# Patient Record
Sex: FEMALE | Race: WHITE | Employment: OTHER | URBAN - METROPOLITAN AREA
[De-identification: names, ages, dates, MRNs, and addresses within clinical notes are randomized per-mention and may not be internally consistent; named-entity substitution may affect disease eponyms.]

---

## 2019-04-29 ENCOUNTER — INITIAL CONSULT (OUTPATIENT)
Dept: NEUROLOGY | Age: 74
End: 2019-04-29
Payer: MEDICARE

## 2019-04-29 VITALS
DIASTOLIC BLOOD PRESSURE: 57 MMHG | BODY MASS INDEX: 19.16 KG/M2 | HEIGHT: 65 IN | HEART RATE: 86 BPM | SYSTOLIC BLOOD PRESSURE: 99 MMHG | WEIGHT: 115 LBS | OXYGEN SATURATION: 95 %

## 2019-04-29 DIAGNOSIS — F02.80 DEMENTIA DUE TO PARKINSON'S DISEASE WITHOUT BEHAVIORAL DISTURBANCE (HCC): ICD-10-CM

## 2019-04-29 DIAGNOSIS — F34.1 DYSTHYMIA: ICD-10-CM

## 2019-04-29 DIAGNOSIS — G20 DEMENTIA DUE TO PARKINSON'S DISEASE WITHOUT BEHAVIORAL DISTURBANCE (HCC): ICD-10-CM

## 2019-04-29 DIAGNOSIS — G25.81 RESTLESS LEGS SYNDROME (RLS): ICD-10-CM

## 2019-04-29 DIAGNOSIS — G20 PD (PARKINSON'S DISEASE) (HCC): Primary | ICD-10-CM

## 2019-04-29 DIAGNOSIS — I10 HTN (HYPERTENSION), BENIGN: ICD-10-CM

## 2019-04-29 DIAGNOSIS — F51.01 PRIMARY INSOMNIA: ICD-10-CM

## 2019-04-29 PROBLEM — G20.A1 PD (PARKINSON'S DISEASE): Status: ACTIVE | Noted: 2019-04-29

## 2019-04-29 PROBLEM — G20.A1 DEMENTIA DUE TO PARKINSON'S DISEASE WITHOUT BEHAVIORAL DISTURBANCE (HCC): Status: ACTIVE | Noted: 2019-04-29

## 2019-04-29 PROCEDURE — 1036F TOBACCO NON-USER: CPT | Performed by: PSYCHIATRY & NEUROLOGY

## 2019-04-29 PROCEDURE — G8420 CALC BMI NORM PARAMETERS: HCPCS | Performed by: PSYCHIATRY & NEUROLOGY

## 2019-04-29 PROCEDURE — 1090F PRES/ABSN URINE INCON ASSESS: CPT | Performed by: PSYCHIATRY & NEUROLOGY

## 2019-04-29 PROCEDURE — 99204 OFFICE O/P NEW MOD 45 MIN: CPT | Performed by: PSYCHIATRY & NEUROLOGY

## 2019-04-29 PROCEDURE — 4040F PNEUMOC VAC/ADMIN/RCVD: CPT | Performed by: PSYCHIATRY & NEUROLOGY

## 2019-04-29 PROCEDURE — G8400 PT W/DXA NO RESULTS DOC: HCPCS | Performed by: PSYCHIATRY & NEUROLOGY

## 2019-04-29 PROCEDURE — G8427 DOCREV CUR MEDS BY ELIG CLIN: HCPCS | Performed by: PSYCHIATRY & NEUROLOGY

## 2019-04-29 PROCEDURE — 3017F COLORECTAL CA SCREEN DOC REV: CPT | Performed by: PSYCHIATRY & NEUROLOGY

## 2019-04-29 PROCEDURE — 1123F ACP DISCUSS/DSCN MKR DOCD: CPT | Performed by: PSYCHIATRY & NEUROLOGY

## 2019-04-29 RX ORDER — CELECOXIB 200 MG/1
200 CAPSULE ORAL 2 TIMES DAILY
Refills: 0 | COMMUNITY
Start: 2019-03-12 | End: 2020-10-19 | Stop reason: SDUPTHER

## 2019-04-29 RX ORDER — HYDROCODONE BITARTRATE AND ACETAMINOPHEN 5; 325 MG/1; MG/1
2 TABLET ORAL DAILY
COMMUNITY
Start: 2019-04-16 | End: 2020-06-03 | Stop reason: SDUPTHER

## 2019-04-29 RX ORDER — MIRABEGRON 25 MG/1
TABLET, FILM COATED, EXTENDED RELEASE ORAL
Refills: 1 | COMMUNITY
Start: 2019-04-09 | End: 2020-04-28

## 2019-04-29 RX ORDER — SIMVASTATIN 40 MG
40 TABLET ORAL NIGHTLY
COMMUNITY
Start: 2019-04-16 | End: 2020-08-31 | Stop reason: SDUPTHER

## 2019-04-29 RX ORDER — ETHAMBUTOL HYDROCHLORIDE 400 MG/1
TABLET, FILM COATED ORAL
Refills: 6 | COMMUNITY
Start: 2019-03-26 | End: 2020-04-28

## 2019-04-29 RX ORDER — TRAZODONE HYDROCHLORIDE 50 MG/1
100 TABLET ORAL NIGHTLY
Refills: 5 | COMMUNITY
Start: 2019-04-09 | End: 2020-11-02 | Stop reason: SDUPTHER

## 2019-04-29 RX ORDER — AZITHROMYCIN 250 MG/1
TABLET, FILM COATED ORAL
Refills: 6 | COMMUNITY
Start: 2019-03-26 | End: 2019-10-14 | Stop reason: ALTCHOICE

## 2019-04-29 NOTE — PROGRESS NOTES
The patient is a 74y.o. years old female who  was referred by Lyn Joseph MD for consultation regarding possible PD    HPI:  The patient was diagnosed with possible Parkinson disease a year ago. She describes gradual onset of hand tremors more left than right, more weakness, difficulty with voice, generalized debilitation and recurrent falling. Symptoms started gradually and progressed to the point now she can not walk or stand by herself. She has been using her walker or wheelchair for the last 6-9 month. She stays most of the time at home. Symptoms are severe and persistent. No triggers or other associated symptoms. No relieving or aggravating factors. The patient is currently on Sinemet  mg three times daily. She feels improvement on such medication but not for long period of time. She is also on Requip 0.5 mg at night for symptoms of RLS. She has daily spells of  leg discomfort at night before she goes to sleep with the urge  to move her legs. Degree can be severe for few minutes. He denies any other sleep disorder. She denies any insomnia, parasomnia or symptoms of sleep apnea. Patient and her  are concerned about her memory. She has been more forgetful over the last 6 months. She has questions repeatedly. She lost interest in socialization and she stays most of the time at home. She describes soft low voice but no dysphagia. No history of psychosis or hallucination. History of depression on trazodone which can help her sleep. No suicidal ideation or thoughts. No recent neuroimaging. No recent head trauma or passing out. She denies any family history of similar disease. No paresthesia. Other review of system was unremarkable.     Past Medical History:   Diagnosis Date    Blood transfusion reaction 2003    Cancer [facility])     breast cancer    History of blood transfusion     Hypertension     Thyroid disease     Unspecified cerebral artery occlusion with 10-12 system review of constitutional, cardiovascular, respiratory, musculoskeletal, endocrine, skin, SHEENT, genitourinary, psychiatric and neurologic systems was obtained and updated today and is unremarkable except as mentioned in my HPI        Exam:   Constitutional:   Vitals:    04/29/19 1245   BP: (!) 99/57   Pulse: 86   SpO2: 95%   Weight: 115 lb (52.2 kg)   Height: 5' 5\" (1.651 m)       General appearance:  Normal development and appear in no acute distress. Eye: No icterus. Fundus: No blurring of optic disc. Neck: supple  Cardiovascular:          No lower leg edema with good pulsation. Mental Status:   Oriented to person, place, problem, and time. Memory: Poor immediate recall. Intact remote memory  Normal attention span and concentration. Language: intact naming, repeating and fluency. Soft speech  Good fund of Knowledge. Cranial Nerves:   II: Visual fields: Full to confrontation and nl [facility]. Pupils: equal, round, reactive to light  III,IV,VI: Extra Ocular Movements are intact. No nystagmus  V: Facial sensation is intact to pin prick and light touch  VII: Facial strength and movements: intact and symmetric  VIII: Hearing: Intact to finger rub bilaterally  IX: Palate elevation is symmetric  XI: Shoulder shrug is intact  XII: Tongue movements are normal  Musculoskeletal: 4/5 in all 4 extremities. Poor effort  Tone: Normal tone. Reflexes: Bilateral biceps 2/4, triceps 2/4, brachial radialis 2/4, knee 2/4 and ankle 2/4. Planters: flexor bilaterally. Coordination: no pronator drift, no dysmetria with FNF in upper extremities. Poor  REM. Postural and resting left hand tremors with mild cog wheeling in her UE  Sensation: normal to all modalities in both arms and legs. Gait/Posture: NT due to weakness.        Medical decision making:  I personally reviewed and updated social history, past medical history, medications, allergy, surgical history, and family history as documented in the patient's electronic health records. Labs and/or neuroimaging and other test results reviewed and discussed with the patient. Reviewed notes from other physicians. Provided patient education regarding risk, benefits and treatment options as well as adherence to medication regimen and side effect from these medications. Assessment:   Diagnosis Orders   1. PD (Parkinson's disease) ([facility] [address]  carbidopa-levodopa (SINEMET)  MG per tablet    Ambulatory referral to Physical Therapy    [facility]    MRI Brain WO Contrast    Vitamin B12 & Folate   2. Dementia due to Parkinson's disease without behavioral disturbance ([facility] [address]  MRI Brain WO Contrast    Vitamin B12 & Folate   3. HTN (hypertension), benign     4. Dysthymia     5. Primary insomnia     6. Restless legs syndrome (RLS)       Possible Parkinson disease with underlying cognitive impairment. Likely worsen due to significant generalized debilitation from lack of activity. Hypertension  Depression  Insomnia  RLS      Plan:  Check B12 and folate  Request recent blood testing  Change Sinemet to  tablet four times daily  Refill for medication  Continue Requip the same dose for now  Long discussion with the patient regarding side effects of these medications. Discussed risk of falling and injury at home  Discussed possibility of generalized debilitation and the need to increase activity at home gradually. We also discussed risk of falling. Will consider Aricept or Exelon during her next visit to help with her memory.    PT, OT and speech evaluation  MRI of the brain to rule out central cause for her generalized weakness  Continue trazodone for depression insomnia  Continue Requip for her RLS  Follow-up in 2 month

## 2019-04-29 NOTE — LETTER
Kimberly Feldman MD    [facility]  [address] [facility], [address] [place] [address]. [address] [place] [address]    [phone] (Phone)  [phone] (Fax)    Dear Dr Irina Rome MD    I had the pleasure of seeing your patient Jewel GUTHRIE.O.B. 1945 today. I have attached a detailed summary below:    The patient is a 74y.o. years old female who  was referred by Perfecto Joseph MD for consultation regarding possible PD    HPI:  The patient was diagnosed with possible Parkinson disease a year ago. She describes gradual onset of hand tremors more left than right, more weakness, difficulty with voice, generalized debilitation and recurrent falling. Symptoms started gradually and progressed to the point now she can not walk or stand by herself. She has been using her walker or wheelchair for the last 6-9 month. She stays most of the time at home. Symptoms are severe and persistent. No triggers or other associated symptoms. No relieving or aggravating factors. The patient is currently on Sinemet  mg three times daily. She feels improvement on such medication but not for long period of time. She is also on Requip 0.5 mg at night for symptoms of RLS. She has daily spells of  leg discomfort at night before she goes to sleep with the urge  to move her legs. Degree can be severe for few minutes. He denies any other sleep disorder. She denies any insomnia, parasomnia or symptoms of sleep apnea. Patient and her  are concerned about her memory. She has been more forgetful over the last 6 months. She has questions repeatedly. She lost interest in socialization and she stays most of the time at home. She describes soft low voice but no dysphagia. No history of psychosis or hallucination. History of depression on trazodone which can help her sleep. No suicidal ideation or thoughts. No recent neuroimaging.   No recent head trauma or passing out. She denies any family history of similar disease. No paresthesia. Other review of system was unremarkable. Past Medical History:   Diagnosis Date    Blood transfusion reaction 2003    Cancer [facility])     breast cancer    History of blood transfusion     Hypertension     Thyroid disease     Unspecified cerebral artery occlusion with cerebral infarction      Prior to Visit Medications    Medication Sig Taking? Authorizing Provider   simvastatin (ZOCOR) 40 MG tablet  Yes Historical Provider, MD   MYRBETRIQ 25 MG TB24  Yes Historical Provider, MD   nefazodone (SERZONE) 150 MG tablet  Yes Historical Provider, MD   celecoxib (CELEBREX) 200 MG capsule  Yes Historical Provider, MD   traZODone (DESYREL) 50 MG tablet  Yes Historical Provider, MD   HYDROcodone-acetaminophen ([facility]) 5-325 MG per tablet  Yes Historical Provider, MD   azithromycin (ZITHROMAX) 250 MG tablet  Yes Historical Provider, MD   ethambutol (MYAMBUTOL) 400 MG tablet  Yes Historical Provider, MD   carbidopa-levodopa (SINEMET)  MG per tablet Take 1 tablet by mouth 4 times daily Yes Reba Robbins MD   levothyroxine (SYNTHROID) 125 MCG tablet Take 125 mcg by mouth Daily. Indications: Take DOS Yes Historical Provider, MD   rOPINIRole (REQUIP) 0.5 MG tablet Take 1 mg by mouth nightly  Yes Historical Provider, MD   amLODIPine (NORVASC) 10 MG tablet Take 10 mg by mouth daily. Indications: take DOS Yes Historical Provider, MD   omeprazole (PRILOSEC) 20 MG capsule Take 20 mg by mouth daily. Indications: take dos Yes Historical Provider, MD     No Known Allergies  Social History     Tobacco Use    Smoking status: Former Smoker     Packs/day: 1.00     Years: 10.00     Pack years: 10.00    Smokeless tobacco: Never Used    Tobacco comment: Quit 40 years ago   Substance Use Topics    Alcohol use:  Yes     Alcohol/week: 0.6 oz     Types: 1 Cans of beer per week     Comment: 1 beer daily     Family History Problem Relation Age of Onset    Cancer Mother         ovarian and colon    Cancer Father         lung    COPD Brother      Past Surgical History:   Procedure Laterality Date    BREAST SURGERY      lumpectomy    COLONOSCOPY  11/5/2015 2003    COLONOSCOPY  11/5/2015    THORACOTOMY  1984    benign tumor removal    UPPER GASTROINTESTINAL ENDOSCOPY  11/5/15       ROS : A 10-12 system review of constitutional, cardiovascular, respiratory, musculoskeletal, endocrine, skin, SHEENT, genitourinary, psychiatric and neurologic systems was obtained and updated today and is unremarkable except as mentioned in my HPI        Exam:   Constitutional:   Vitals:    04/29/19 1245   BP: (!) 99/57   Pulse: 86   SpO2: 95%   Weight: 115 lb (52.2 kg)   Height: 5' 5\" (1.651 m)       General appearance:  Normal development and appear in no acute distress. Eye: No icterus. Fundus: No blurring of optic disc. Neck: supple  Cardiovascular:          No lower leg edema with good pulsation. Mental Status:   Oriented to person, place, problem, and time. Memory: Poor immediate recall. Intact remote memory  Normal attention span and concentration. Language: intact naming, repeating and fluency. Soft speech  Good fund of Knowledge. Cranial Nerves:   II: Visual fields: Full to confrontation and nl [facility]. Pupils: equal, round, reactive to light  III,IV,VI: Extra Ocular Movements are intact. No nystagmus  V: Facial sensation is intact to pin prick and light touch  VII: Facial strength and movements: intact and symmetric  VIII: Hearing: Intact to finger rub bilaterally  IX: Palate elevation is symmetric  XI: Shoulder shrug is intact  XII: Tongue movements are normal  Musculoskeletal: 4/5 in all 4 extremities. Poor effort  Tone: Normal tone. Reflexes: Bilateral biceps 2/4, triceps 2/4, brachial radialis 2/4, knee 2/4 and ankle 2/4. Planters: flexor bilaterally.   Coordination: no pronator drift, no dysmetria with FNF in upper extremities. Poor  REM. Postural and resting left hand tremors with mild cog wheeling in her UE  Sensation: normal to all modalities in both arms and legs. Gait/Posture: NT due to weakness. Medical decision making:  I personally reviewed and updated social history, past medical history, medications, allergy, surgical history, and family history as documented in the patient's electronic health records. Labs and/or neuroimaging and other test results reviewed and discussed with the patient. Reviewed notes from other physicians. Provided patient education regarding risk, benefits and treatment options as well as adherence to medication regimen and side effect from these medications. Assessment:   Diagnosis Orders   1. PD (Parkinson's disease) ([facility] [address]  carbidopa-levodopa (SINEMET)  MG per tablet    Ambulatory referral to Physical Therapy    [facility] Speech [facility]    MRI Brain WO Contrast    Vitamin B12 & Folate   2. Dementia due to Parkinson's disease without behavioral disturbance ([facility] [address]  MRI Brain WO Contrast    Vitamin B12 & Folate   3. HTN (hypertension), benign     4. Dysthymia     5. Primary insomnia     6. Restless legs syndrome (RLS)       Possible Parkinson disease with underlying cognitive impairment. Likely worsen due to significant generalized debilitation from lack of activity. Hypertension  Depression  Insomnia  RLS      Plan:  Check B12 and folate  Request recent blood testing  Change Sinemet to 25100 tablet four times daily  Refill for medication  Continue Requip the same dose for now  Long discussion with the patient regarding side effects of these medications. Discussed risk of falling and injury at home  Discussed possibility of generalized debilitation and the need to increase activity at home gradually. We also discussed risk of falling. Will consider Aricept or Exelon during her next visit to help with her memory.    PT, OT and speech evaluation

## 2019-05-08 ENCOUNTER — TELEPHONE (OUTPATIENT)
Dept: NEUROLOGY | Age: 74
End: 2019-05-08

## 2019-05-08 ENCOUNTER — HOSPITAL ENCOUNTER (OUTPATIENT)
Dept: MRI IMAGING | Age: 74
Discharge: HOME | End: 2019-05-08
Payer: MEDICARE

## 2019-05-08 DIAGNOSIS — G20 PD (PARKINSON'S DISEASE) (HCC): ICD-10-CM

## 2019-05-08 DIAGNOSIS — G08 CEREBRAL VENOUS THROMBOSIS: Primary | ICD-10-CM

## 2019-05-08 DIAGNOSIS — G20 DEMENTIA DUE TO PARKINSON'S DISEASE WITHOUT BEHAVIORAL DISTURBANCE (HCC): ICD-10-CM

## 2019-05-08 DIAGNOSIS — F02.80 DEMENTIA DUE TO PARKINSON'S DISEASE WITHOUT BEHAVIORAL DISTURBANCE (HCC): ICD-10-CM

## 2019-05-08 PROCEDURE — 70551 MRI BRAIN STEM W/O DYE: CPT

## 2019-05-09 ENCOUNTER — TELEPHONE (OUTPATIENT)
Dept: NEUROLOGY | Age: 74
End: 2019-05-09

## 2019-05-09 NOTE — TELEPHONE ENCOUNTER
Lucy with [facility] called to give verbal results on patient's MRI. I did ask if this was the repeat MRI, and she stated that it was not, that the patient would be contacted when the MRI machine is repaired to return for the additional images. The radiologist ask that she call the office with the verbal results of the images that he was able to read. 3. Abnormal signal in the right sigmoid and transverse sinus, concerning for   dural venous sinus thrombosis.  A CT venogram may be of benefit for further   evaluation. Do you want to proceed with ordering a CT VENOGRAM for patient in regards to the DVST? Please advise.

## 2019-05-09 NOTE — TELEPHONE ENCOUNTER
Patient notified. Patient verbalized understanding. I gave her [facility] Scheduling phone number and advised her to have her  call me with any questions.

## 2019-05-09 NOTE — TELEPHONE ENCOUNTER
Advised patient about her quant results.  Lab was ordered and appointment was scheduled.    Will need MRV brain

## 2019-05-10 ENCOUNTER — TELEPHONE (OUTPATIENT)
Dept: NEUROLOGY | Age: 74
End: 2019-05-10

## 2019-05-10 DIAGNOSIS — G08 CEREBRAL VENOUS THROMBOSIS: Primary | ICD-10-CM

## 2019-05-10 NOTE — TELEPHONE ENCOUNTER
Jerod Gutierres from [employer] called [phone] requesting existing MRV order be changed to with & without contrast per Radiologist. Janis Butterfield order is just for MRV with contrast.     Please advise. Thank you.

## 2019-05-13 ENCOUNTER — HOSPITAL ENCOUNTER (OUTPATIENT)
Dept: PHYSICAL THERAPY | Age: 74
Setting detail: THERAPIES SERIES
Discharge: HOME | End: 2019-05-13
Payer: MEDICARE

## 2019-05-13 ENCOUNTER — HOSPITAL ENCOUNTER (OUTPATIENT)
Dept: SPEECH THERAPY | Age: 74
Setting detail: THERAPIES SERIES
Discharge: HOME | End: 2019-05-13
Payer: MEDICARE

## 2019-05-13 ENCOUNTER — HOSPITAL ENCOUNTER (OUTPATIENT)
Age: 74
Discharge: HOME | End: 2019-05-13
Payer: MEDICARE

## 2019-05-13 DIAGNOSIS — G20 PD (PARKINSON'S DISEASE) (HCC): ICD-10-CM

## 2019-05-13 DIAGNOSIS — G20 DEMENTIA DUE TO PARKINSON'S DISEASE WITHOUT BEHAVIORAL DISTURBANCE (HCC): ICD-10-CM

## 2019-05-13 DIAGNOSIS — F02.80 DEMENTIA DUE TO PARKINSON'S DISEASE WITHOUT BEHAVIORAL DISTURBANCE (HCC): ICD-10-CM

## 2019-05-13 DIAGNOSIS — R48.8 OTHER SYMBOLIC DYSFUNCTIONS: Primary | ICD-10-CM

## 2019-05-13 LAB
FOLATE: >20 NG/ML (ref 4.78–24.2)
VITAMIN B-12: 447 PG/ML (ref 211–911)

## 2019-05-13 PROCEDURE — 92523 SPEECH SOUND LANG COMPREHEN: CPT

## 2019-05-13 PROCEDURE — 36415 COLL VENOUS BLD VENIPUNCTURE: CPT

## 2019-05-13 PROCEDURE — 82746 ASSAY OF FOLIC ACID SERUM: CPT

## 2019-05-13 PROCEDURE — 97162 PT EVAL MOD COMPLEX 30 MIN: CPT

## 2019-05-13 PROCEDURE — 97116 GAIT TRAINING THERAPY: CPT

## 2019-05-13 PROCEDURE — 97110 THERAPEUTIC EXERCISES: CPT

## 2019-05-13 PROCEDURE — 82607 VITAMIN B-12: CPT

## 2019-05-13 NOTE — PROGRESS NOTES
The following patient has been evaluated for physical therapy services. In order for therapy to continue, Medicare requires physician review of the treatment plan. Please review the attached evaluation and/or summary of the patient's plan of care, and verify that you agree by signing below and sending it back to our office. PT also recommends referral for OT evaluation and treatment. Signing below will verify that you agree. Thank you for this referral.    Physician signature_______________________ Date________________    Fax to:   [facility] [phone]             NEUROLOGICAL / GENERAL MOBILITY PHYSICAL THERAPY EVALUATION      Evaluation Date: 5/13/2019        Patient Name:  Jada Durant       YOB: 1945        Medical Diagnosis: Parkinson's Disease        ICD 10:  G20    Treatment Diagnosis:  Abnormality of gait, weakness, decreased balance    Onset Date:   3 years ago     Referral Date:  4/29/19    Referring Physician:  Dr. Garima Nichols         Visits Allowed/Insurance/Certification Information:  Medicare / [facility]     Restrictions/Precautions:  Hx of breast CA, HTN (pt stopped taking her BP meds and hasn't told MD), hx of CVA    Pt's Occupation/Job Duties:  Pt is retired     Health History reviewed with pt:   [x]   Yes   []   No          SUBJECTIVE FINDINGS      History of Present Illness:  Pt states she began having symptoms about 3 years ago, with hand tremors. Symptoms have progressed and PCP suspected Parkinsons. Pt referred to neuro, pt had MRI and now scheduled for MRV at [facility]. Pt referred to outpt therapies. Pt was having OT and PT at home. Pt states she stopped taking her BP med about 7-10 ago, felt tired and thought BP was low even though she doesn't really check it at home. Pt does have BP monitor at home. Pain     [x]Yes   []No   Patient describes pain to be thoracic area, 3-4 years duration.   Also has pain in B LEs  Patient reports pain is  /10 pain at present and  6/10 pain at its worst.  Worsened by not sure (thoracic). Legs worsen with prolonged sitting   Improved by heating pad, pain meds (thoracic)  Legs improve with walking with walker. Current Functional Limitations:   [x]Yes   []No   Functional Complaints:   Walking community distances (pt uses rollator in home), steps, doesn't go to [employer] anymore, doesn't go to store, pt gets help from [address] [place] [address] for showering, gets help with dressing from , pt able to toilet herself. Pt also complains of arm weakness. PLOF:   [x]No functional limitations prior to 3 years ago   []Pt with previous functional limitations   Pt's sleep is affected?    [x]Yes   []No  Sometimes wakes up with tremors L hand, leg sometimes    Social support/Environment:    Family/caregiver support:   [x]Yes  (had MI in December but doing well)  []No    Home Environment:   []1 story   [x]>2 story (bed and bath on main floor)   [x]VETO (8)    []No rail   [x]R handrail  Going into home and window ledge on the Left side   []L handrail    Bathroom Environment:   [x]Walk in shower   []Tub   []Tub/shower combo     [x]Grab bars   [x]Shower seat   [x]Modified toilet   [x]Hand held shower head    Equipment:    []Rolling walker   []Standard walker   [x]Rollator   []Merrick-walker  [x]Wheelchair   []Quad cane   []Straight cane  [x]Bedside commode  [x]Hospital bed  Other:      Patient goal for therapy:  \"get up and walk without using rollator, back would quit hurting \"      OBJECTIVE FINDINGS      Cognitive Status    Alert and oriented to:   [x]Person   [x]Place   [x]Time   [x]Situation    Communication   [x]WFL   []Impaired     Comments:     Cardiopulmonary Status   []NT  Blood pressure: 119/76  Upon standing 94/73  + orthostatic hypotension  Heart Rate: 72  SpO2: NT    Tone   [x]WNL   []Hypertonia   []Hypotonia   Location:    []R UE   []L  UE   []R LE   []L  LE    Trunk Control   [x]Good   []Fair []Poor    Vision:   []WNL   [x]Impaired  Comments:  Wears bifocals, also has cataracts B     Hearing:   []WNL   [x]Impaired  Comments: some hearing loss but no hearing aides     Sensation    Light touch:   [x]WNL   []Impaired  Location:    Proprioception:  [x]WNL    []Impaired  Location:    []R UE   []L  UE   []R LE   []L  LE    Strength/ROM    []All ROM WNL except as marked below    []All strength WNL (5/5) except as marked below (measured out of 5)   [x]UE ROM/strength deferred to OT       Movement Left Right Comments Movement Left Right Comments   Shoulder Flexion    Hip Flexion 4 4    Shoulder Extension    Hip Extension NT NT    Shoulder Abduction    Hip Abduction 4- 3-    Shoulder IR    Hip Adduction 2+ 2+    Shoulder ER    Hip Internal Rotation 2+ 2+    Elbow Flexion     Hip External Rotation 2+ 2+    Elbow Extension    Knee Flexion 4 4    Supination    Knee Extension 4 4    Pronation    Ankle Dorsiflexion 3- 4    Wrist Flexion    Ankle Plantarflexion NT NT    Wrist Extension    Ankle Inversion 3- 4    Hand    Ankle Eversion 3- 4        Flexibility     Muscle Findings   Hip flexors/Azar  []WNL   [x]Decreased R   [x]Decreased L   []NT   Hamstrings  Degrees in 90/90 []WNL   [x]Decreased R   [x]Decreased L   []NT  Right:              Left:      Gastrocs []WNL   [x]Decreased R   [x]Decreased L   []NT   Obers/TFL/ITB []WNL   []Decreased R   []Decreased L   [x]NT   Piriformis  []WNL   []Decreased R   []Decreased L   [x]NT   Other:  []WNL   []Decreased R   []Decreased L   []NT     Reflexes   []All reflexes WNL (2+) or negative test except as marked below      Reflex Left Right   Biceps (C5,6)     Brachioradialis (C6)     Triceps (C7)     Quadriceps (L3,4)     Achilles (S1,2)     Ankle clonus     Mel's reflex      Babinski's reflex        Coordination Testing:     [x]NT  Finger to Nose:        []WNL     []Impaired  Alternating pronation/supination:  []WNL     []Impaired  Heel to shin (sitting or supine): []WNL     []Impaired  Toe Tapping:        []WNL     []Impaired    Functional Mobility    Transitional Movement Assistance Level   Rolling to left side [] I   []Mod I   []S   []SBA   []CGA   [x]MIN A   []MOD A   []MAX A   []TOTAL   []NT   Rolling to right side [] I   []Mod I   []S   []SBA   []CGA   [x]MIN A   []MOD A   []MAX A   []TOTAL   []NT   Scooting in bed [x] I   []Mod I   []S   []SBA   []CGA   []MIN A   []MOD A   []MAX A   []TOTAL   []NT   Supine to sit [x] I   []Mod I   []S   []SBA   []CGA   []MIN A   []MOD A   []MAX A   []TOTAL   []NT   Sit to supine [x] I   []Mod I   []S   []SBA   []CGA   []MIN A   []MOD A   []MAX A   []TOTAL   []NT   Sit to stand [] I   []Mod I   []S   [x]SBA   []CGA   []MIN A   []MOD A   []MAX A   []TOTAL   []NT   Stand to sit [] I   []Mod I   []S   [x]SBA   []CGA   []MIN A   []MOD A   []MAX A   []TOTAL   []NT   Bed to chair  [] I   []Mod I   []S   []SBA   [x]CGA   []MIN A   []MOD A    []MAX A   []TOTAL   []NT   Toilet transfer  [] I   []Mod I   []S   []SBA   []CGA   []MIN A   []MOD A    []MAX A   []TOTAL   [x]NT   Tub transfer  [] I   []Mod I   []S   []SBA   []CGA   []MIN A   []MOD A    []MAX A   []TOTAL   [x]NT   Car transfer [] I   []Mod I   []S   []SBA   []CGA   []MIN A   []MOD A    []MAX A   []TOTAL   [x]NT     Balance    Static Sitting:        []Normal   [x]Good   []Fair   []Poor  Dynamic Sitting:   []Normal   [x]Good   []Fair   []Poor  Static Stance:       []Normal   []Good   [x]Fair   []Poor  Dynamic Stance:  []Normal   []Good   []Fair   [x]Poor    Tinetti Total Score:    Balance Score: 6  Gait Score: 6  Tinetti Total Score: 12    Risk of Falls:   []Low (> 24)   []Mod (19-23)   [x]High (< 18)    Gait Testing:     Level of Assistance needed:   []Ind   []Mod I   []SBA/Sup   [x]CGA   []MIN A   []MOD A  []MAX A   []Dependent/Total Assist     Gait Deviations (firm surface/linoleum):     [] None   []Slow rex    []Increased NATALIA   [x]Decreased step length  [x]Decreased step height Therapy  [x]   Neuromuscular Re-Education  [x]   Gait Training   [x]   Therapeutic Activity  []   Other     Thank you for the referral of this patient.       Timed Code Treatment Minutes:   45  minutes     Total Treatment Time:  80  minutes    Lisandro Guillen PT, OMT-C, [id number]

## 2019-05-13 NOTE — PROGRESS NOTES
Speech Language Pathology  [facility] Phone: [phone] Hospital Fax: [phone]    To:       From: Dixie Barry, LUCIEN-SLP     Patient: Porfirio Mojica     : 1945  Medical Diagnosis:    Date: 2019  ICD 10:  Treatment Diagnosis:      Speech Therapy Certification/Re-Certification Form  Dear Dr. Michelle Serrano  The following patient has been evaluated for speech therapy services. In order for therapy to continue, we require physician review of the treatment plan. Please review the attached evaluation and/or summary of the patient's plan of care, and verify that you agree by signing below and sending it back to our office. Thank you for this referral.      Plan of Care/Treatment to date:  X Speech-Language Evaluation/Treatment    ? Dysphagia Evaluation/Treatment        ? Dysphagia Treatment via Neuromuscular Electrical Stimulation (NMES)   ? Modified Barium Swallowing Study   X Cognitive-Linguistic Skills Development  ? Voice evaluation and Treatment      ? Evaluation, modification, and Training of Voice Prosthetic     ? Evaluation for Speech-Generating Augmentative and Alternative Communication Device   ? Therapeutic Services for the use of Speech-Generating Device. ? Other:          Goals:  Short-term Goals  Timeframe for Short-term Goals: 2 weeks ( 2019)  Goal 1: The patient will complete graded short term memory tasks with 90% accuracy given no cues  Goal 2: The patient will complete high-level problem solving tasks with 90% accuracy given no cues  Goal 3: The patient will complete graded attention tasks with 90% accuracy given no cues  Long-term Goals  Timeframe for Long-term Goals: 1 month (2019)  Goal 1: The patient will implement compensatory strategies and recommendation to enhance functional memory skills to [facility]      Frequency/Duration:  # Days per week: ? 1 day # Weeks: ? 1 week ? 5 weeks      X 2 days   ? 2 weeks X 6 weeks     ? 3 days   ?  3 weeks ? 7 weeks     ? 4 days   ? 4 weeks ? 8 weeks    Rehab Potential: ? Excellent X Good ? Fair  ? Poor       Electronically signed by:  Lizz Ballesteros MA,CCC-SLP      If you have any questions or concerns, please don't hesitate to call.   Thank you for your referral.      Physician Signature:________________________________Date:__________________  By signing above, therapists plan is approved by physician

## 2019-05-13 NOTE — FLOWSHEET NOTE
Outpatient Physical Therapy     [x] Daily Treatment Note   [] Progress Note   [] Discharge Note    Date:  5/13/2019    Patient Name:  Jian Lamas         YOB: 1945    Medical Diagnosis:   Parkinson's Disease                                         ICD 10:  G20     Treatment Diagnosis:  Abnormality of gait, weakness, decreased balance     Onset Date:    3 years ago          Referral Date:  4/29/19     Referring Physician:  Dr. Isaiah Jason                                                     Visits Allowed/Insurance/Certification Information:  Medicare / [employer] [facility]      Restrictions/Precautions:  Hx of breast CA, HTN (pt stopped taking her BP meds and hasn't told MD), hx of CVA    Plan of care sent to provider:   []NA   [x]Faxed   [x]Co-signature  (sent for OT request also)     Plan of care signed:    []NA   []Yes   [x]No      Progress Note covers period from (if applicable):    [x]NA    []From          To           Next Progress Note due:  6/3/19     Visit# / total visits:  1/12-16    Plan for Next Session:  Nustep, progress LE flexibility and strength exs as able, bed mobility, transfers, gait, balance and steps    Subjective:  See eval     Pain level:   AT EVAL:   Patient describes pain to be thoracic area, 3-4 years duration. Also has pain in B LEs  Patient reports pain is  /10 pain at present and  6/10 pain at its worst.  Worsened by not sure (thoracic). Legs worsen with prolonged sitting   Improved by heating pad, pain meds (thoracic)  Legs improve with walking with walke    Objective:       Exercises:    Exercises in bold performed in department today. Items not bolded are carried forward from prior visits for continuity of the record.   Exercise/Equipment Resistance/Repetitions Other comments            bridges 1x10 Pt to try to work up to 3x10, 1-2x/day     Hip add mya hook lying or seated Hold 5 sec, 1x10   \"     Supine hip abd B  1x10 B   \"     LAQ 1x10 B   \"     Seated heel/toe raises 1x10   \"                                                                                                   Therapeutic Exercise/Home Exercise Program:   25 minutes  Pt inst in role of PT, prognosis, plan of care, activity modification, and benefits of therapy. HEP established, See above, pt given handout(s)       Group Therapy:    0 minutes    Therapeutic Activity:  5 minutes  Cues for bed mobility and safe transfers     Gait: 15 minutes  Gait in dept with rollator, cues for safe technique, proper walker placement, transfer to chair    Neuromuscular Re-Education:  0 minutes      Canalith Repositioning Procedure:      Manual Therapy:  0 minutes    Modalities: 0 minutes    Functional Outcome Measure:   []NA  Measure Used:   Date Assessed:    Assessment/Treatment/Activity Tolerance:    Patients response to treatment:   [x]Patient tolerated treatment well []Patient limited by fatigue   []Patient limited by pain  []Patient limited by other medical complications   []Other:     Goals:   Progress towards goals:  NA    GOALS    Short Term Goals:   3-4  weeks Long Term Goals:  6-8  weeks   1). Establish HEP 1). Pt independent with HEP   2). All bed mobility and transfers independent 2). Gait with LRAD all distances and  independent   3). Increase score on Tinetti to 18/28 or better  3). Pt able to do flight of steps with reciprocal pattern with 1 rail with SBA    4). Gait with RW /rollator 250' x 1 SBA  4).  Increase Tinetti score to 24/28       Prognosis: [x]Good   []Fair   []Poor    Patient Requires Follow-up:  [x]Yes  []No    Plan: [x]Plan of care initiated     []Continue per plan of care    [] Alter current plan (see comments)    []Hold pending MD visit []Discharge    Timed Code Treatment Minutes:  45    Total Treatment Minutes:  80    Medicare Cap total YTD:  $170    Electronically signed by:  Kodak Angeles, PT, OMT-C, [id number]

## 2019-05-13 NOTE — TELEPHONE ENCOUNTER
[facility] scheduling is asking for new order for MRV to be with and without contrast.    Okay to change order? Please advise.

## 2019-05-13 NOTE — PROGRESS NOTES
Speech Language Pathology  Facility/Department: [facility] SPEECH THERAPY  Initial Assessment    NAME: Zeb Barry  : 1945  MRN: [id number]    Date of Eval: 2019  Evaluating Therapist: Alin Krishnan    Visit Diagnoses       Codes    Other symbolic dysfunctions    -  Primary R48.8            Past Medical History: has a past medical history of Blood transfusion reaction, Cancer ([address] History of blood transfusion, Hypertension, Thyroid disease, and Unspecified cerebral artery occlusion with cerebral infarction. Past Surgical History:  has a past surgical history that includes Breast surgery; thoracotomy (); Colonoscopy (2015); Colonoscopy (2015); and Upper gastrointestinal endoscopy (11/5/15). Primary Complaint: The patient is a 75 y/o female who was diagnosed with parkinson's disease approx 1 yr prior. Following dx, the patient reports noticing problems with short term memory that have been progressing as time goes on. Neurologist recommending cognitive-linguistic evaluation at this time to assess cognitive-linguistic abilities and provide treatment as warranted. Onset Date: 19    Pain:  Pain Assessment  Patient Currently in Pain: No    Assessment:  Cognitive Diagnosis: Mild Cognitice Deficit     The patient presents with mild cognitive deficits at this time. SLP assessing the following areas:   -  Visuospatial/executive function  - Naming  - Attention  - Memory  - Language  - Recall  - Abstraction  - Orientation    SLP administering [facility] where the patient scored 26 out of 30. Notable deficits were in the areas of delayed recall, attention, and memory. While the patient scored WNL for this assessment, the patient reports her memory problems have been worsening at home and that she experiences more memory issues during completion of daily tasks.  For example, the patient states that she frequently forgets why she walked into a Cognition  Orientation  Overall Orientation Status: Within Normal Limits  Attention  Attention: Exceptions to [facility]  Alternating Attention: Moderate  Memory  Memory: Exceptions to [facility]  Daily Routines: Mild  Short-term Memory: Moderate  Problem Solving  Problem Solving: Exceptions to [facility]  Complex Functional Tasks: Mild  Numeric Reasoning  Numeric Reasoning: Within Functional Limits  Abstract Reasoning  Abstract Reasoning: Within Functional Limits  Safety/Judgement  Safety/Judgement: Within Functional Limits      Plan:    Goals:   Short-term Goals  Timeframe for Short-term Goals: 2 weeks ( 5/27/2019)  Goal 1: The patient will complete graded short term memory tasks with 90% accuracy given no cues  Goal 2: The patient will complete high-level problem solving tasks with 90% accuracy given no cues  Goal 3: The patient will complete graded attention tasks with 90% accuracy given no cues  Long-term Goals  Timeframe for Long-term Goals: 1 month (6/13/2019)  Goal 1: The patient will implement compensatory strategies and recommendation to enhance functional memory skills to [facility]      Speech Therapy Prognosis  Prognosis: Good  Prognosis Considerations: Co-Morbidities, Medical Diagnosis, Medical Prognosis  Duration/Frequency of Treatment  Duration/Frequency of Treatment: 1-2x/wk  Recommendations  Requires SLP Intervention: Yes  D/C Recommendations: To be determined  Patient Education: SLP re: Importance and rationale of cognitive evaluation, results of evaluation, recommendations for additional treatent  Patient Education Response: Verbalizes understanding  D/C Recommendations: To be determined  Requires SLP Intervention: Yes  Patient/family involved in developing goals and treatment plan: Yes          Follow Up:   Follow up in: 1 week      MEKA Ellis M.A., [address] [place] [address] [id number]  Speech-Language Pathologist

## 2019-05-14 ENCOUNTER — TELEPHONE (OUTPATIENT)
Dept: NEUROLOGY | Age: 74
End: 2019-05-14

## 2019-05-14 DIAGNOSIS — G08 CEREBRAL VENOUS THROMBOSIS: Primary | ICD-10-CM

## 2019-05-14 NOTE — TELEPHONE ENCOUNTER
Glo Holley from [address] [place] [address] called needing a lab order (Creatinine) placed for pt to come next week please. Last seen 4/29/19  Next appt is 7/22/19    Please advise. Thank you.

## 2019-05-15 ENCOUNTER — HOSPITAL ENCOUNTER (OUTPATIENT)
Dept: SPEECH THERAPY | Age: 74
Setting detail: THERAPIES SERIES
Discharge: HOME | End: 2019-05-15
Payer: MEDICARE

## 2019-05-15 ENCOUNTER — HOSPITAL ENCOUNTER (OUTPATIENT)
Dept: PHYSICAL THERAPY | Age: 74
Setting detail: THERAPIES SERIES
Discharge: HOME | End: 2019-05-15
Payer: MEDICARE

## 2019-05-15 NOTE — PROGRESS NOTES
Physical Therapy Discharge Note      This note covers period from 5/13/19 to 5/15/19  Pt has decided to continue therapy services at [facility].  Pt has only attended 1 visit of PT for evaluation and initiation of treatment. Goals not met. Will discharge pt from PT.      [address] [place] [address], PT, OMT-C, [id number]  5/19/19

## 2019-05-20 ENCOUNTER — APPOINTMENT (OUTPATIENT)
Dept: SPEECH THERAPY | Age: 74
End: 2019-05-20
Payer: MEDICARE

## 2019-05-20 ENCOUNTER — APPOINTMENT (OUTPATIENT)
Dept: PHYSICAL THERAPY | Age: 74
End: 2019-05-20
Payer: MEDICARE

## 2019-05-23 ENCOUNTER — APPOINTMENT (OUTPATIENT)
Dept: PHYSICAL THERAPY | Age: 74
End: 2019-05-23
Payer: MEDICARE

## 2019-05-23 ENCOUNTER — APPOINTMENT (OUTPATIENT)
Dept: SPEECH THERAPY | Age: 74
End: 2019-05-23
Payer: MEDICARE

## 2019-05-30 ENCOUNTER — APPOINTMENT (OUTPATIENT)
Dept: PHYSICAL THERAPY | Age: 74
End: 2019-05-30
Payer: MEDICARE

## 2019-05-30 ENCOUNTER — APPOINTMENT (OUTPATIENT)
Dept: SPEECH THERAPY | Age: 74
End: 2019-05-30
Payer: MEDICARE

## 2019-07-22 ENCOUNTER — OFFICE VISIT (OUTPATIENT)
Dept: NEUROLOGY | Age: 74
End: 2019-07-22
Payer: MEDICARE

## 2019-07-22 VITALS
WEIGHT: 115 LBS | HEART RATE: 86 BPM | BODY MASS INDEX: 19.16 KG/M2 | HEIGHT: 65 IN | SYSTOLIC BLOOD PRESSURE: 116 MMHG | DIASTOLIC BLOOD PRESSURE: 71 MMHG | OXYGEN SATURATION: 97 %

## 2019-07-22 DIAGNOSIS — I10 HTN (HYPERTENSION), BENIGN: ICD-10-CM

## 2019-07-22 DIAGNOSIS — Z86.73 REMOTE HISTORY OF STROKE: ICD-10-CM

## 2019-07-22 DIAGNOSIS — G20 PD (PARKINSON'S DISEASE) (HCC): Primary | ICD-10-CM

## 2019-07-22 DIAGNOSIS — F34.1 DYSTHYMIA: ICD-10-CM

## 2019-07-22 DIAGNOSIS — F02.80 DEMENTIA DUE TO PARKINSON'S DISEASE WITHOUT BEHAVIORAL DISTURBANCE (HCC): ICD-10-CM

## 2019-07-22 DIAGNOSIS — G20 DEMENTIA DUE TO PARKINSON'S DISEASE WITHOUT BEHAVIORAL DISTURBANCE (HCC): ICD-10-CM

## 2019-07-22 DIAGNOSIS — G25.81 RESTLESS LEGS SYNDROME (RLS): ICD-10-CM

## 2019-07-22 PROCEDURE — 1123F ACP DISCUSS/DSCN MKR DOCD: CPT | Performed by: PSYCHIATRY & NEUROLOGY

## 2019-07-22 PROCEDURE — G8400 PT W/DXA NO RESULTS DOC: HCPCS | Performed by: PSYCHIATRY & NEUROLOGY

## 2019-07-22 PROCEDURE — 99214 OFFICE O/P EST MOD 30 MIN: CPT | Performed by: PSYCHIATRY & NEUROLOGY

## 2019-07-22 PROCEDURE — G8427 DOCREV CUR MEDS BY ELIG CLIN: HCPCS | Performed by: PSYCHIATRY & NEUROLOGY

## 2019-07-22 PROCEDURE — G8420 CALC BMI NORM PARAMETERS: HCPCS | Performed by: PSYCHIATRY & NEUROLOGY

## 2019-07-22 PROCEDURE — 1036F TOBACCO NON-USER: CPT | Performed by: PSYCHIATRY & NEUROLOGY

## 2019-07-22 PROCEDURE — 3017F COLORECTAL CA SCREEN DOC REV: CPT | Performed by: PSYCHIATRY & NEUROLOGY

## 2019-07-22 PROCEDURE — 1090F PRES/ABSN URINE INCON ASSESS: CPT | Performed by: PSYCHIATRY & NEUROLOGY

## 2019-07-22 PROCEDURE — 4040F PNEUMOC VAC/ADMIN/RCVD: CPT | Performed by: PSYCHIATRY & NEUROLOGY

## 2019-07-22 RX ORDER — VORTIOXETINE 5 MG/1
TABLET, FILM COATED ORAL
Refills: 3 | COMMUNITY
Start: 2019-07-02 | End: 2020-04-28

## 2019-07-22 NOTE — PROGRESS NOTES
The patient is a 74years old female who came today for follow-up regarding possible Parkinson, ataxia, dementia and recent work up. Since her last visit, she had MRI of the brain which was somewhat limited although showed remote right MCA stroke. Recent blood test showed normal B12. She continues to have daily tremors in her hands more left than right. Degree can be moderate and duration is minutes. Tremors can be worse at rest or with stress. She increase her Sinemet to  tablet 4 times daily. No significant improvement with such a change although she feels Sinemet can help the tremors for at least 15 minutes. She takes Requip 0.5 mg at night for RLS. She continues to walk with her walker. She has been active at home. She had few sessions with PT and OT without improvement. She denies any recent falling or injury or bladder or bowel issues. No other triggers or other associated symptoms. She feels stiff in her legs but not her arms. No severe constipation, dysphagia or dysphonia. She continues have daily episodes of short-term memory loss. Degree is mild to moderate. She needs prompting her . No significant psychosis hallucination. No recurrent syncope or dizziness. Patient denies any new symptoms today. Other review of system was unremarkable. Past Medical History:   Diagnosis Date    Blood transfusion reaction 2003    Cancer [facility])     breast cancer    History of blood transfusion     Hypertension     Thyroid disease     Unspecified cerebral artery occlusion with cerebral infarction      Prior to Visit Medications    Medication Sig Taking?  Authorizing Provider   TRINTELLIX 5 MG tablet  Yes Historical Provider, MD   simvastatin (ZOCOR) 40 MG tablet  Yes Historical Provider, MD   MYRBETRIQ 25 MG TB24  Yes Historical Provider, MD   nefazodone (SERZONE) 150 MG tablet  Yes Historical Provider, MD   celecoxib (CELEBREX) 200 MG capsule  Yes Historical Provider, MD

## 2019-07-24 DIAGNOSIS — G20 PD (PARKINSON'S DISEASE) (HCC): ICD-10-CM

## 2019-08-22 ENCOUNTER — TELEPHONE (OUTPATIENT)
Dept: NEUROLOGY | Age: 74
End: 2019-08-22

## 2019-08-22 RX ORDER — ROPINIROLE 1 MG/1
1 TABLET, FILM COATED ORAL 2 TIMES DAILY
Qty: 60 TABLET | Refills: 2 | Status: SHIPPED | OUTPATIENT
Start: 2019-08-22 | End: 2019-11-19 | Stop reason: SDUPTHER

## 2019-10-14 ENCOUNTER — OFFICE VISIT (OUTPATIENT)
Dept: NEUROLOGY | Age: 74
End: 2019-10-14
Payer: MEDICARE

## 2019-10-14 VITALS
SYSTOLIC BLOOD PRESSURE: 115 MMHG | HEIGHT: 66 IN | HEART RATE: 78 BPM | OXYGEN SATURATION: 97 % | BODY MASS INDEX: 18.48 KG/M2 | WEIGHT: 115 LBS | DIASTOLIC BLOOD PRESSURE: 63 MMHG

## 2019-10-14 DIAGNOSIS — F34.1 DYSTHYMIA: ICD-10-CM

## 2019-10-14 DIAGNOSIS — G20 DEMENTIA DUE TO PARKINSON'S DISEASE WITHOUT BEHAVIORAL DISTURBANCE (HCC): ICD-10-CM

## 2019-10-14 DIAGNOSIS — G20 PD (PARKINSON'S DISEASE) (HCC): Primary | ICD-10-CM

## 2019-10-14 DIAGNOSIS — Z86.73 REMOTE HISTORY OF STROKE: ICD-10-CM

## 2019-10-14 DIAGNOSIS — I10 HTN (HYPERTENSION), BENIGN: ICD-10-CM

## 2019-10-14 DIAGNOSIS — G25.81 RESTLESS LEGS SYNDROME (RLS): ICD-10-CM

## 2019-10-14 DIAGNOSIS — F02.80 DEMENTIA DUE TO PARKINSON'S DISEASE WITHOUT BEHAVIORAL DISTURBANCE (HCC): ICD-10-CM

## 2019-10-14 PROCEDURE — 1123F ACP DISCUSS/DSCN MKR DOCD: CPT | Performed by: PSYCHIATRY & NEUROLOGY

## 2019-10-14 PROCEDURE — 99214 OFFICE O/P EST MOD 30 MIN: CPT | Performed by: PSYCHIATRY & NEUROLOGY

## 2019-10-14 PROCEDURE — 4040F PNEUMOC VAC/ADMIN/RCVD: CPT | Performed by: PSYCHIATRY & NEUROLOGY

## 2019-10-14 PROCEDURE — G8400 PT W/DXA NO RESULTS DOC: HCPCS | Performed by: PSYCHIATRY & NEUROLOGY

## 2019-10-14 PROCEDURE — 1090F PRES/ABSN URINE INCON ASSESS: CPT | Performed by: PSYCHIATRY & NEUROLOGY

## 2019-10-14 PROCEDURE — 1036F TOBACCO NON-USER: CPT | Performed by: PSYCHIATRY & NEUROLOGY

## 2019-10-14 PROCEDURE — G8484 FLU IMMUNIZE NO ADMIN: HCPCS | Performed by: PSYCHIATRY & NEUROLOGY

## 2019-10-14 PROCEDURE — G8420 CALC BMI NORM PARAMETERS: HCPCS | Performed by: PSYCHIATRY & NEUROLOGY

## 2019-10-14 PROCEDURE — G8427 DOCREV CUR MEDS BY ELIG CLIN: HCPCS | Performed by: PSYCHIATRY & NEUROLOGY

## 2019-10-21 DIAGNOSIS — G20 PD (PARKINSON'S DISEASE) (HCC): ICD-10-CM

## 2019-11-19 RX ORDER — ROPINIROLE 1 MG/1
1 TABLET, FILM COATED ORAL NIGHTLY
Qty: 90 TABLET | Refills: 1 | Status: SHIPPED | OUTPATIENT
Start: 2019-11-19 | End: 2020-01-07 | Stop reason: SDUPTHER

## 2020-01-06 ENCOUNTER — TELEPHONE (OUTPATIENT)
Dept: NEUROLOGY | Age: 75
End: 2020-01-06

## 2020-01-06 NOTE — TELEPHONE ENCOUNTER
Left detailed message on voicemail explaining that previously she had a Requip 0.5 mg tab and was taking two tabs at night, this prescription is a 1 mg tab; therefore, she only needs to take one tab at night.

## 2020-01-07 RX ORDER — ROPINIROLE 0.5 MG/1
0.5 TABLET, FILM COATED ORAL 2 TIMES DAILY
Qty: 180 TABLET | Refills: 1 | Status: SHIPPED | OUTPATIENT
Start: 2020-01-07 | End: 2020-06-23 | Stop reason: SDUPTHER

## 2020-02-04 ENCOUNTER — TELEPHONE (OUTPATIENT)
Dept: NEUROLOGY | Age: 75
End: 2020-02-04

## 2020-02-04 NOTE — TELEPHONE ENCOUNTER
Patient calls stating she has been having bladder leakage and needing to wear undergarments at night and during the day. She is asking if this is DT her diagnosis of PD? I recommended that she speak with her family doctor in regards to this issue and she may need to be evaluated by a urologist.    Patient asks how I know this answer without even talking to the doctor, and goes on to say she is also having decreased strength and balance. I spoke with patient at length in regards to her current symptoms, and offered to give her appt TBS prior to her follow-up scheduled for 04.07.20, patient declined stating she will speak with PCP in regards to her bladder issue, because that is the only \"real problem\" she is having right now, but if she changes her mind, she wcub.

## 2020-02-28 ENCOUNTER — TELEPHONE (OUTPATIENT)
Dept: NEUROLOGY | Age: 75
End: 2020-02-28

## 2020-03-02 RX ORDER — AMITRIPTYLINE HYDROCHLORIDE 10 MG/1
10 TABLET, FILM COATED ORAL NIGHTLY
Qty: 30 TABLET | Refills: 1 | Status: SHIPPED
Start: 2020-03-02 | End: 2020-04-28 | Stop reason: ALTCHOICE

## 2020-04-28 ENCOUNTER — TELEPHONE (OUTPATIENT)
Dept: NEUROLOGY | Age: 75
End: 2020-04-28

## 2020-04-28 ENCOUNTER — VIRTUAL VISIT (OUTPATIENT)
Dept: FAMILY MEDICINE CLINIC | Age: 75
End: 2020-04-28
Payer: MEDICARE

## 2020-04-28 PROCEDURE — 99442 PR PHYS/QHP TELEPHONE EVALUATION 11-20 MIN: CPT | Performed by: FAMILY MEDICINE

## 2020-04-28 ASSESSMENT — ENCOUNTER SYMPTOMS: SHORTNESS OF BREATH: 0

## 2020-04-28 NOTE — PROGRESS NOTES
Tasha Mayes is a 66 y.o. female evaluated via telephone on 4/28/2020. Consent:  She and/or health care decision maker is aware that that she may receive a bill for this telephone service, depending on her insurance coverage, and has provided verbal consent to proceed: Yes    Had been seeing Dr Cruz Burrell,, then Dr Jody Lane. Only seen once by Dr Jody Lane. Has been seeing neuro for parkinson's. Has been on current meds for some time. Sig diff w/ ambulation. Uses rollator. Limits ability to ambulate. Takes pain medication on occas. Typically one per day. Ongoing back pain. Hx of cortisone shots in the past in the back. Seems to help. Mild memory loss. Diff functioning. Hypertension   This is a chronic problem. The current episode started more than 1 year ago. The problem is unchanged. The problem is controlled. Pertinent negatives include no chest pain or shortness of breath. Risk factors for coronary artery disease include post-menopausal state and dyslipidemia. Past treatments include calcium channel blockers. Hyperlipidemia   This is a chronic problem. The current episode started more than 1 year ago. The problem is controlled. Pertinent negatives include no chest pain or shortness of breath. Current antihyperlipidemic treatment includes statins. The current treatment provides moderate improvement of lipids. Risk factors for coronary artery disease include post-menopausal and hypertension. no SE w/ amlodipine. Last labs some time ago. Documentation:  I communicated with the patient and/or health care decision maker about see and below. Details of this discussion including any medical advice provided: see above and below    1. PD (Parkinson's disease) ([facility]  F/u w/ neuro as sched. May periodically need small amt of pain medication. Consider at that time. 2. Dementia due to Parkinson's disease without behavioral disturbance ([facility] [address]  patrice meds. Rpt labs.   F/u w/ neuro

## 2020-06-03 RX ORDER — HYDROCODONE BITARTRATE AND ACETAMINOPHEN 5; 325 MG/1; MG/1
1 TABLET ORAL 2 TIMES DAILY PRN
Qty: 60 TABLET | Refills: 0 | Status: SHIPPED | OUTPATIENT
Start: 2020-06-03 | End: 2020-07-01 | Stop reason: SDUPTHER

## 2020-06-04 ENCOUNTER — NURSE ONLY (OUTPATIENT)
Dept: FAMILY MEDICINE CLINIC | Age: 75
End: 2020-06-04
Payer: MEDICARE

## 2020-06-04 LAB
A/G RATIO: 1.9 (ref 1.1–2.2)
ALBUMIN SERPL-MCNC: 4.5 G/DL (ref 3.4–5)
ALP BLD-CCNC: 91 U/L (ref 40–129)
ALT SERPL-CCNC: <5 U/L (ref 10–40)
ANION GAP SERPL CALCULATED.3IONS-SCNC: 13 MMOL/L (ref 3–16)
AST SERPL-CCNC: 19 U/L (ref 15–37)
BASOPHILS ABSOLUTE: 0.1 K/UL (ref 0–0.2)
BASOPHILS RELATIVE PERCENT: 1.4 %
BILIRUB SERPL-MCNC: 0.5 MG/DL (ref 0–1)
BUN BLDV-MCNC: 13 MG/DL (ref 7–20)
CALCIUM SERPL-MCNC: 9.3 MG/DL (ref 8.3–10.6)
CHLORIDE BLD-SCNC: 100 MMOL/L (ref 99–110)
CHOLESTEROL, TOTAL: 145 MG/DL (ref 0–199)
CO2: 24 MMOL/L (ref 21–32)
CREAT SERPL-MCNC: 0.9 MG/DL (ref 0.6–1.2)
EOSINOPHILS ABSOLUTE: 0.2 K/UL (ref 0–0.6)
EOSINOPHILS RELATIVE PERCENT: 2.8 %
GFR AFRICAN AMERICAN: >60
GFR NON-AFRICAN AMERICAN: >60
GLOBULIN: 2.4 G/DL
GLUCOSE BLD-MCNC: 91 MG/DL (ref 70–99)
HCT VFR BLD CALC: 35.6 % (ref 36–48)
HDLC SERPL-MCNC: 80 MG/DL (ref 40–60)
HEMOGLOBIN: 12 G/DL (ref 12–16)
LDL CHOLESTEROL CALCULATED: 49 MG/DL
LYMPHOCYTES ABSOLUTE: 1.9 K/UL (ref 1–5.1)
LYMPHOCYTES RELATIVE PERCENT: 34 %
MCH RBC QN AUTO: 32.5 PG (ref 26–34)
MCHC RBC AUTO-ENTMCNC: 33.8 G/DL (ref 31–36)
MCV RBC AUTO: 95.9 FL (ref 80–100)
MONOCYTES ABSOLUTE: 0.4 K/UL (ref 0–1.3)
MONOCYTES RELATIVE PERCENT: 6.8 %
NEUTROPHILS ABSOLUTE: 3.1 K/UL (ref 1.7–7.7)
NEUTROPHILS RELATIVE PERCENT: 55 %
PDW BLD-RTO: 13.8 % (ref 12.4–15.4)
PLATELET # BLD: 252 K/UL (ref 135–450)
PMV BLD AUTO: 9.4 FL (ref 5–10.5)
POTASSIUM SERPL-SCNC: 4.1 MMOL/L (ref 3.5–5.1)
RBC # BLD: 3.71 M/UL (ref 4–5.2)
SODIUM BLD-SCNC: 137 MMOL/L (ref 136–145)
T4 FREE: 1.6 NG/DL (ref 0.9–1.8)
TOTAL PROTEIN: 6.9 G/DL (ref 6.4–8.2)
TRIGL SERPL-MCNC: 82 MG/DL (ref 0–150)
TSH REFLEX: 0.04 UIU/ML (ref 0.27–4.2)
VITAMIN B-12: 557 PG/ML (ref 211–911)
VLDLC SERPL CALC-MCNC: 16 MG/DL
WBC # BLD: 5.6 K/UL (ref 4–11)

## 2020-06-04 PROCEDURE — 36415 COLL VENOUS BLD VENIPUNCTURE: CPT | Performed by: FAMILY MEDICINE

## 2020-06-04 NOTE — PROGRESS NOTES
Blood drawn per order. Needle size: 23 g  Site: L Cephalic. First attempt successful Yes    Second attempt no    Pressure applied until bleeding stopped. Yes applied. Patient informed to call office or return if bleeding reoccurs and unable to stop.     Tubes drawn: 1 purple     2 red

## 2020-06-05 LAB — T3 TOTAL: 0.97 NG/ML (ref 0.8–2)

## 2020-06-05 NOTE — RESULT ENCOUNTER NOTE
cmp normal.  b12 normal.  TSH a little suppressed, but free T3 and T4 normal. Cont current thyroid meds. Cbc essentially normal.  Lipids well controlled.   No changes at this time based on labs

## 2020-06-19 RX ORDER — ROPINIROLE 0.5 MG/1
TABLET, FILM COATED ORAL
Qty: 180 TABLET | Refills: 0 | OUTPATIENT
Start: 2020-06-19

## 2020-06-23 ENCOUNTER — OFFICE VISIT (OUTPATIENT)
Dept: NEUROLOGY | Age: 75
End: 2020-06-23
Payer: MEDICARE

## 2020-06-23 VITALS
BODY MASS INDEX: 19.16 KG/M2 | HEART RATE: 90 BPM | HEIGHT: 65 IN | TEMPERATURE: 97.9 F | OXYGEN SATURATION: 97 % | DIASTOLIC BLOOD PRESSURE: 71 MMHG | WEIGHT: 115 LBS | SYSTOLIC BLOOD PRESSURE: 125 MMHG

## 2020-06-23 PROCEDURE — 1123F ACP DISCUSS/DSCN MKR DOCD: CPT | Performed by: PSYCHIATRY & NEUROLOGY

## 2020-06-23 PROCEDURE — G8427 DOCREV CUR MEDS BY ELIG CLIN: HCPCS | Performed by: PSYCHIATRY & NEUROLOGY

## 2020-06-23 PROCEDURE — G8420 CALC BMI NORM PARAMETERS: HCPCS | Performed by: PSYCHIATRY & NEUROLOGY

## 2020-06-23 PROCEDURE — 4040F PNEUMOC VAC/ADMIN/RCVD: CPT | Performed by: PSYCHIATRY & NEUROLOGY

## 2020-06-23 PROCEDURE — G8400 PT W/DXA NO RESULTS DOC: HCPCS | Performed by: PSYCHIATRY & NEUROLOGY

## 2020-06-23 PROCEDURE — 1036F TOBACCO NON-USER: CPT | Performed by: PSYCHIATRY & NEUROLOGY

## 2020-06-23 PROCEDURE — 99214 OFFICE O/P EST MOD 30 MIN: CPT | Performed by: PSYCHIATRY & NEUROLOGY

## 2020-06-23 PROCEDURE — 1090F PRES/ABSN URINE INCON ASSESS: CPT | Performed by: PSYCHIATRY & NEUROLOGY

## 2020-06-23 RX ORDER — AMITRIPTYLINE HYDROCHLORIDE 25 MG/1
25 TABLET, FILM COATED ORAL NIGHTLY
Qty: 30 TABLET | Refills: 2 | Status: SHIPPED | OUTPATIENT
Start: 2020-06-23 | End: 2020-09-23 | Stop reason: SINTOL

## 2020-06-23 RX ORDER — ROPINIROLE 1 MG/1
1 TABLET, FILM COATED ORAL 3 TIMES DAILY
Qty: 270 TABLET | Refills: 1 | Status: SHIPPED | OUTPATIENT
Start: 2020-06-23 | End: 2020-12-14 | Stop reason: SDUPTHER

## 2020-06-23 NOTE — PROGRESS NOTES
rOPINIRole (REQUIP) 1 MG tablet Take 1 tablet by mouth 3 times daily Yes Suleiman Ward MD   carbidopa-levodopa (SINEMET)  MG per tablet Take 1 tablet by mouth 5 times daily Yes Suleiman Ward MD   HYDROcodone-acetaminophen (NORCO) 5-325 MG per tablet Take 1 tablet by mouth 2 times daily as needed for Pain for up to 30 days. Yes Christa Pickens MD   simvastatin (ZOCOR) 40 MG tablet Take 40 mg by mouth nightly  Yes Historical Provider, MD   celecoxib (CELEBREX) 200 MG capsule Take 200 mg by mouth 2 times daily  Yes Historical Provider, MD   traZODone (DESYREL) 50 MG tablet  Yes Historical Provider, MD   levothyroxine (SYNTHROID) 125 MCG tablet Take 125 mcg by mouth Daily. Indications: Take DOS Yes Historical Provider, MD   amLODIPine (NORVASC) 10 MG tablet Take 10 mg by mouth daily. Indications: take DOS Yes Historical Provider, MD   omeprazole (PRILOSEC) 20 MG capsule Take 20 mg by mouth daily. Indications: take dos Yes Historical Provider, MD     No Known Allergies  Social History     Tobacco Use    Smoking status: Former Smoker     Packs/day: 1.00     Years: 10.00     Pack years: 10.00    Smokeless tobacco: Never Used    Tobacco comment: Quit 40 years ago   Substance Use Topics    Alcohol use:  Yes     Alcohol/week: 1.0 standard drinks     Types: 1 Cans of beer per week     Comment: 1 beer daily     Family History   Problem Relation Age of Onset    Cancer Mother         ovarian and colon    Cancer Father         lung    COPD Brother      Past Surgical History:   Procedure Laterality Date    BREAST SURGERY      lumpectomy    COLONOSCOPY  11/5/2015    2003    COLONOSCOPY  11/5/2015    THORACOTOMY  1984    benign tumor removal    UPPER GASTROINTESTINAL ENDOSCOPY  11/5/15       ROS : A 10-12 system review of constitutional, cardiovascular, respiratory, musculoskeletal, endocrine, skin, SHEENT, genitourinary, psychiatric and neurologic systems was obtained and updated today and is

## 2020-07-01 RX ORDER — HYDROCODONE BITARTRATE AND ACETAMINOPHEN 5; 325 MG/1; MG/1
1 TABLET ORAL 2 TIMES DAILY PRN
Qty: 60 TABLET | Refills: 0 | Status: SHIPPED | OUTPATIENT
Start: 2020-07-01 | End: 2020-08-03 | Stop reason: SDUPTHER

## 2020-07-01 NOTE — TELEPHONE ENCOUNTER
Date of last refill of this med was 6/3/2020, # of pills given 60 and # of refills given 0. Their next appointment is None, the last date patient was seen was 4/28/2020 . Does patient have medication agreement on file? No  Has drug screen been done in last 12 months if needed? N/A    Pt states that she'll be due on Saturday and with us being closed on Friday and weekends, she wanted to call in the  before she forgot.

## 2020-07-07 ENCOUNTER — TELEPHONE (OUTPATIENT)
Dept: NEUROLOGY | Age: 75
End: 2020-07-07

## 2020-07-07 RX ORDER — GABAPENTIN 100 MG/1
100 CAPSULE ORAL NIGHTLY
Qty: 30 CAPSULE | Refills: 2 | Status: SHIPPED | OUTPATIENT
Start: 2020-07-07 | End: 2020-09-23

## 2020-07-07 NOTE — TELEPHONE ENCOUNTER
DC amitriptyline  Can start low-dose gabapentin 100 mg at night only. This medicine can also make her sleepy and drowsy. She needs to be aware of possible side effect.

## 2020-07-07 NOTE — TELEPHONE ENCOUNTER
Do you want to prescribe Gabapentin? Please advise. Patient states she has been taking the Amitriptyline 25 mg QHS x 2 wks and she is not going to take it anymore, because it makes her too sleepy. Next appointment scheduled for 09.08.20. Last seen 06.23.20         Assessment:    Diagnosis Orders   1. PD (Parkinson's disease) ([facility] [address]  amitriptyline (ELAVIL) 25 MG tablet     rOPINIRole (REQUIP) 1 MG tablet     carbidopa-levodopa (SINEMET)  MG per tablet   2. Dementia due to Parkinson's disease without behavioral disturbance (HCC)      3. Restless legs syndrome (RLS)  rOPINIRole (REQUIP) 1 MG tablet   4. Remote history of stroke      5. Dysthymia      6. HTN (hypertension), benign         Chronic cognitive impairment with Parkinson  Parkinson disease  Chronic pain with recent worsening. Could be related to RLS, Parkinson disease or even underlying polyneuropathy. Not controlled. RLS  Remote stroke  Hypertension  Hyperlipidemia  Depression     Plan:  Continue Sinemet  tab 5 times daily  Refill for medication  Continue Requip 1 mg three times daily  6-month refill  Side effect from this medication was discussed  Increase amitriptyline to 25 mg at night. This could help with her insomnia and chronic pain. If symptoms are not better, I will consider low-dose of gabapentin. The patient will give me an update in 2 weeks. Continue with the frequent walking and falling precautions at home  Continue using her walker  Discussed improving sleep hygiene avoid daytime naps  Continue current blood pressure medications  Discussed possibility of polypharmacy and sedation with trazodone and amitriptyline  Continue Synthroid  Multivitamin daily  Follow-up in 3 months, in the meantime she will call me in 2 weeks.

## 2020-07-07 NOTE — TELEPHONE ENCOUNTER
Pt called said she was seen 6/23/20 & was put on Amitriptyline 25 mg taking one nightly. She said it makes her too tired & she can't take it. She asked if she could try something else instead? Last appt was 6/23/20. Next appt is 9/8/20. Please advise. Thank you.

## 2020-08-03 RX ORDER — LEVOTHYROXINE SODIUM 0.12 MG/1
125 TABLET ORAL DAILY
Qty: 30 TABLET | Refills: 5 | Status: SHIPPED | OUTPATIENT
Start: 2020-08-03 | End: 2021-02-01 | Stop reason: SDUPTHER

## 2020-08-03 RX ORDER — HYDROCODONE BITARTRATE AND ACETAMINOPHEN 5; 325 MG/1; MG/1
1 TABLET ORAL 2 TIMES DAILY PRN
Qty: 60 TABLET | Refills: 0 | Status: SHIPPED | OUTPATIENT
Start: 2020-08-03 | End: 2020-09-01 | Stop reason: SDUPTHER

## 2020-08-31 RX ORDER — SIMVASTATIN 40 MG
40 TABLET ORAL NIGHTLY
Qty: 90 TABLET | Refills: 2 | Status: SHIPPED | OUTPATIENT
Start: 2020-08-31 | End: 2021-09-13 | Stop reason: SDUPTHER

## 2020-09-01 RX ORDER — HYDROCODONE BITARTRATE AND ACETAMINOPHEN 5; 325 MG/1; MG/1
1 TABLET ORAL 2 TIMES DAILY PRN
Qty: 60 TABLET | Refills: 0 | Status: SHIPPED | OUTPATIENT
Start: 2020-09-01 | End: 2020-09-30 | Stop reason: SDUPTHER

## 2020-09-01 NOTE — TELEPHONE ENCOUNTER
Date of last refill of this med was 8/3/2020, # of pills given 60 and # of refills given 0. Their next appointment is 9/8/2020, the last date patient was seen was 4/28/020. Does patient have medication agreement on file? No  Has drug screen been done in last 12 months if needed?  no

## 2020-09-18 ENCOUNTER — TELEPHONE (OUTPATIENT)
Dept: NEUROLOGY | Age: 75
End: 2020-09-18

## 2020-09-18 ENCOUNTER — TELEPHONE (OUTPATIENT)
Dept: FAMILY MEDICINE CLINIC | Age: 75
End: 2020-09-18

## 2020-09-18 NOTE — TELEPHONE ENCOUNTER
----- Message from Emanuel Bowser sent at 9/18/2020 11:44 AM EDT -----  Subject: Message to Provider    QUESTIONS  Information for Provider? Patient called wanting to know if her pcp will   prescribe her medication for parkinson's medication . She advise her pcp   is closer to her home . Please call her to advise .   ---------------------------------------------------------------------------  --------------  CALL BACK INFO  What is the best way for the office to contact you? OK to leave message on   voicemail  Preferred Call Back Phone Number? [phone]  ---------------------------------------------------------------------------  --------------  SCRIPT ANSWERS  Relationship to Patient?  Self

## 2020-09-23 ENCOUNTER — OFFICE VISIT (OUTPATIENT)
Dept: NEUROLOGY | Age: 75
End: 2020-09-23
Payer: MEDICARE

## 2020-09-23 VITALS
HEART RATE: 86 BPM | DIASTOLIC BLOOD PRESSURE: 70 MMHG | BODY MASS INDEX: 19.16 KG/M2 | WEIGHT: 115 LBS | SYSTOLIC BLOOD PRESSURE: 111 MMHG | HEIGHT: 65 IN | OXYGEN SATURATION: 95 % | TEMPERATURE: 97.9 F

## 2020-09-23 PROCEDURE — G8400 PT W/DXA NO RESULTS DOC: HCPCS | Performed by: PSYCHIATRY & NEUROLOGY

## 2020-09-23 PROCEDURE — G8427 DOCREV CUR MEDS BY ELIG CLIN: HCPCS | Performed by: PSYCHIATRY & NEUROLOGY

## 2020-09-23 PROCEDURE — 4040F PNEUMOC VAC/ADMIN/RCVD: CPT | Performed by: PSYCHIATRY & NEUROLOGY

## 2020-09-23 PROCEDURE — 1036F TOBACCO NON-USER: CPT | Performed by: PSYCHIATRY & NEUROLOGY

## 2020-09-23 PROCEDURE — 99214 OFFICE O/P EST MOD 30 MIN: CPT | Performed by: PSYCHIATRY & NEUROLOGY

## 2020-09-23 PROCEDURE — 1090F PRES/ABSN URINE INCON ASSESS: CPT | Performed by: PSYCHIATRY & NEUROLOGY

## 2020-09-23 PROCEDURE — G8420 CALC BMI NORM PARAMETERS: HCPCS | Performed by: PSYCHIATRY & NEUROLOGY

## 2020-09-23 PROCEDURE — 1123F ACP DISCUSS/DSCN MKR DOCD: CPT | Performed by: PSYCHIATRY & NEUROLOGY

## 2020-09-23 NOTE — PROGRESS NOTES
The patient came today for follow-up regarding parkinsonism and dementia      Since her last visit, she continues to take the same dose of Sinemet 5 times daily. No side effects of such medication. She lives with her . She denies any recent falling since her last visit. She is not very active at home. She stays most of the time in the house and not ambulating. She gets assistant from the  with ADL. She described feeling fatigue and tired all the time. Generalized pain which is the same. She is on hydrocodone twice daily which does help. Pain is generalized and diffuse. She denies any headache or chest pain or dizziness. No recent syncope or fainting. She continues to have daily episode of short-term memory loss. Degree is moderate.  denies any hallucination. She needs prompt frequently. History of RLS on Requip. She is on 1 mg 3 times daily. No side effects on Requip. No lightheadedness or dizziness. She is on trazodone at night which does help her sleep and depression. No major sleep disturbance, insomnia or parasomnia. No history of RBD. Other review of system was unremarkable. Past Medical History:   Diagnosis Date    Blood transfusion reaction 2003    Cancer [facility])     breast cancer    History of blood transfusion     Hypertension     Thyroid disease     Unspecified cerebral artery occlusion with cerebral infarction      Prior to Visit Medications    Medication Sig Taking? Authorizing Provider   HYDROcodone-acetaminophen (NORCO) 5-325 MG per tablet Take 1 tablet by mouth 2 times daily as needed for Pain for up to 30 days. Yes Candelaria Cifuentes MD   simvastatin (ZOCOR) 40 MG tablet Take 1 tablet by mouth nightly Yes Candelaria Cifuentes MD   nefazodone (SERZONE) 150 MG tablet Take 1 tablet by mouth 2 times daily Yes Candelaria Cifuentes MD   levothyroxine (SYNTHROID) 125 MCG tablet Take 1 tablet by mouth Daily Indications:  Take DOS Yes Candelaria Cifuentes MD   rOPINIRole (REQUIP) 1 MG tablet Take 1 tablet by mouth 3 times daily Yes Nabila Christianson MD   carbidopa-levodopa (SINEMET)  MG per tablet Take 1 tablet by mouth 5 times daily Yes Nabila Christianson MD   celecoxib (CELEBREX) 200 MG capsule Take 200 mg by mouth 2 times daily  Yes Historical Provider, MD   traZODone (DESYREL) 50 MG tablet  Yes Historical Provider, MD   amLODIPine (NORVASC) 10 MG tablet Take 10 mg by mouth daily. Indications: take DOS Yes Historical Provider, MD   omeprazole (PRILOSEC) 20 MG capsule Take 20 mg by mouth daily. Indications: take dos Yes Historical Provider, MD     No Known Allergies  Social History     Tobacco Use    Smoking status: Former Smoker     Packs/day: 1.00     Years: 10.00     Pack years: 10.00    Smokeless tobacco: Never Used    Tobacco comment: Quit 40 years ago   Substance Use Topics    Alcohol use: Yes     Alcohol/week: 1.0 standard drinks     Types: 1 Cans of beer per week     Comment: 1 beer daily     Family History   Problem Relation Age of Onset    Cancer Mother         ovarian and colon    Cancer Father         lung    COPD Brother      Past Surgical History:   Procedure Laterality Date    BREAST SURGERY      lumpectomy    COLONOSCOPY  11/5/2015    2003    COLONOSCOPY  11/5/2015    THORACOTOMY  1984    benign tumor removal    UPPER GASTROINTESTINAL ENDOSCOPY  11/5/15       ROS : A 10-12 system review of constitutional, cardiovascular, respiratory, musculoskeletal, endocrine, skin, SHEENT, genitourinary, psychiatric and neurologic systems was obtained and updated today and is unremarkable except as mentioned in my HPI  No change        Exam:   Constitutional:   Vitals:    09/23/20 1403   BP: 111/70   Pulse: 86   Temp: 97.9 °F (36.6 °C)   TempSrc: Infrared   SpO2: 95%   Weight: 115 lb (52.2 kg)   Height: 5' 5\" (1.651 m)     No change  General appearance:  Normal development and appear in no acute distress. Eye: No icterus.    Neck: supple  Cardiovascular: No lower leg edema with good pulsation. Mental Status: the same  Oriented to person, place, problem, and time. Memory: Poor immediate recall. Intact remote memory  Normal attention span and concentration. Language: intact naming, repeating and fluency. Soft speech  Good fund of Knowledge. Cranial Nerves:   II:  Pupils: equal, round, reactive to light  III,IV,VI: Extra Ocular Movements are intact. No nystagmus  V: Facial sensation is intact to pin prick and light touch  VII: Facial strength and movements: intact and symmetric  VIII: Hearing: Intact to finger rub bilaterally  IX: Palate elevation is symmetric  XI: Shoulder shrug is intact  XII: Tongue movements are normal  Musculoskeletal: 4/5 in all 4 extremities. Poor effort  Tone: Normal tone. Reflexes: Symmetric 2+ in the arms 1+ in the leg. Planters: flexor bilaterally. Coordination: no pronator drift, no dysmetria with FNF in upper extremities. Poor  REM. No tremors today. Sensation: normal to all modalities in both arms and legs. Gait/Posture: Walks with her walker. Small step to gait but no significant freezing. Exam unchanged. Medical decision making:    I personally reviewed and updated social history, past medical history, medications, allergy, surgical history, and family history as documented in the patient's electronic health records. Labs and/or neuroimaging and other test results reviewed and discussed with the patient. Reviewed notes from other physicians. Provided patient education regarding risk, benefits and treatment options as well as adherence to medication regimen and side effect from these medications. No change      Assessment:   Diagnosis Orders   1. PD (Parkinson's disease) [facility])  Ambulatory referral to Physical Therapy   2. Dementia due to Parkinson's disease without behavioral disturbance ([facility] [address]     3. Restless legs syndrome (RLS)     4. Dysthymia     5.  HTN (hypertension), benign         Plan:  Continue Sinemet

## 2020-09-30 RX ORDER — HYDROCODONE BITARTRATE AND ACETAMINOPHEN 5; 325 MG/1; MG/1
1 TABLET ORAL 2 TIMES DAILY PRN
Qty: 60 TABLET | Refills: 0 | Status: SHIPPED | OUTPATIENT
Start: 2020-09-30 | End: 2020-10-30 | Stop reason: SDUPTHER

## 2020-10-15 ENCOUNTER — OFFICE VISIT (OUTPATIENT)
Dept: FAMILY MEDICINE CLINIC | Age: 75
End: 2020-10-15
Payer: MEDICARE

## 2020-10-15 VITALS
HEIGHT: 62 IN | OXYGEN SATURATION: 97 % | TEMPERATURE: 98.2 F | SYSTOLIC BLOOD PRESSURE: 112 MMHG | HEART RATE: 101 BPM | BODY MASS INDEX: 23.77 KG/M2 | WEIGHT: 129.2 LBS | DIASTOLIC BLOOD PRESSURE: 64 MMHG

## 2020-10-15 PROBLEM — R32 URINARY INCONTINENCE: Status: ACTIVE | Noted: 2020-10-15

## 2020-10-15 LAB
BILIRUBIN, POC: NORMAL
BLOOD URINE, POC: NORMAL
CLARITY, POC: CLEAR
COLOR, POC: YELLOW
GLUCOSE URINE, POC: NORMAL
KETONES, POC: NORMAL
LEUKOCYTE EST, POC: NORMAL
NITRITE, POC: NORMAL
PH, POC: 5.5
PROTEIN, POC: NORMAL
SPECIFIC GRAVITY, POC: 1.02
UROBILINOGEN, POC: 1

## 2020-10-15 PROCEDURE — G8484 FLU IMMUNIZE NO ADMIN: HCPCS | Performed by: NURSE PRACTITIONER

## 2020-10-15 PROCEDURE — 99213 OFFICE O/P EST LOW 20 MIN: CPT | Performed by: NURSE PRACTITIONER

## 2020-10-15 PROCEDURE — 1036F TOBACCO NON-USER: CPT | Performed by: NURSE PRACTITIONER

## 2020-10-15 PROCEDURE — G0008 ADMIN INFLUENZA VIRUS VAC: HCPCS | Performed by: NURSE PRACTITIONER

## 2020-10-15 PROCEDURE — 81003 URINALYSIS AUTO W/O SCOPE: CPT | Performed by: NURSE PRACTITIONER

## 2020-10-15 PROCEDURE — 4040F PNEUMOC VAC/ADMIN/RCVD: CPT | Performed by: NURSE PRACTITIONER

## 2020-10-15 PROCEDURE — 0509F URINE INCON PLAN DOCD: CPT | Performed by: NURSE PRACTITIONER

## 2020-10-15 PROCEDURE — 1090F PRES/ABSN URINE INCON ASSESS: CPT | Performed by: NURSE PRACTITIONER

## 2020-10-15 PROCEDURE — 1123F ACP DISCUSS/DSCN MKR DOCD: CPT | Performed by: NURSE PRACTITIONER

## 2020-10-15 PROCEDURE — G8427 DOCREV CUR MEDS BY ELIG CLIN: HCPCS | Performed by: NURSE PRACTITIONER

## 2020-10-15 PROCEDURE — G8400 PT W/DXA NO RESULTS DOC: HCPCS | Performed by: NURSE PRACTITIONER

## 2020-10-15 PROCEDURE — 90694 VACC AIIV4 NO PRSRV 0.5ML IM: CPT | Performed by: NURSE PRACTITIONER

## 2020-10-15 PROCEDURE — G8420 CALC BMI NORM PARAMETERS: HCPCS | Performed by: NURSE PRACTITIONER

## 2020-10-15 RX ORDER — CIPROFLOXACIN 250 MG/1
250 TABLET, FILM COATED ORAL 2 TIMES DAILY
Qty: 6 TABLET | Refills: 0 | Status: SHIPPED | OUTPATIENT
Start: 2020-10-15 | End: 2020-10-18

## 2020-10-15 ASSESSMENT — ENCOUNTER SYMPTOMS
CHEST TIGHTNESS: 0
ABDOMINAL PAIN: 0
CONSTIPATION: 1
BACK PAIN: 0
NAUSEA: 0
CHOKING: 0
PHOTOPHOBIA: 0
VOICE CHANGE: 0
DIARRHEA: 0
COLOR CHANGE: 0
SINUS PAIN: 0
EYE PAIN: 0
RHINORRHEA: 0
BLOOD IN STOOL: 0
SINUS PRESSURE: 0
VOMITING: 0
SORE THROAT: 0
STRIDOR: 0
COUGH: 0
TROUBLE SWALLOWING: 0
EYE ITCHING: 0
SHORTNESS OF BREATH: 0
EYE REDNESS: 0
EYE DISCHARGE: 0
WHEEZING: 0

## 2020-10-15 NOTE — PROGRESS NOTES
Chief Complaint   Patient presents with    Incontinence     urinary incontinence        /64   Pulse 101   Temp 98.2 °F (36.8 °C)   Ht 5' 1.5\" (1.562 m)   Wt 129 lb 3.2 oz (58.6 kg)   SpO2 97%   Breastfeeding No   BMI 24.02 kg/m²     HPI:  Kendy Harman is a 66 y.o. (: 1945) here today   for   HPI    Urine incontinence:    She wanted to get a purewik due to her having Parkinsons. She has incontinence at night. She has been having to wear two diapers at night to try to absorb the urine. She has had urinary incontinence for 2 years. She needs a note to get a purwik and would benefit greatly form getting one. This will help her with her skin integrity as well. Linzess:     She would like to restart this. She has constipation but worse with parkinsons. She would like to get back on this medication. Patient's medications, allergies, past medical, surgical, social and family histories were reviewed and updated asappropriate. ROS:  Review of Systems   Constitutional: Negative for activity change, appetite change, chills, diaphoresis, fatigue, fever and unexpected weight change. HENT: Negative for congestion, ear discharge, ear pain, hearing loss, nosebleeds, postnasal drip, rhinorrhea, sinus pressure, sinus pain, sneezing, sore throat, tinnitus, trouble swallowing and voice change. Eyes: Negative for photophobia, pain, discharge, redness and itching. Respiratory: Negative for cough, choking, chest tightness, shortness of breath, wheezing and stridor. Cardiovascular: Negative for chest pain, palpitations and leg swelling. Gastrointestinal: Positive for constipation. Negative for abdominal pain, blood in stool, diarrhea, nausea and vomiting. Endocrine: Negative for cold intolerance, heat intolerance, polydipsia and polyuria. Genitourinary: Negative for difficulty urinating, dysuria, enuresis, flank pain, frequency, hematuria and urgency.         Urine incontinence Musculoskeletal: Negative for back pain, gait problem, joint swelling, neck pain and neck stiffness. Skin: Negative for color change, pallor, rash and wound. Allergic/Immunologic: Negative for environmental allergies and food allergies. Neurological: Negative for dizziness, tremors, syncope, speech difficulty, weakness, light-headedness, numbness and headaches. Hematological: Negative for adenopathy. Does not bruise/bleed easily. Psychiatric/Behavioral: Negative for agitation, behavioral problems, confusion, decreased concentration, dysphoric mood, hallucinations, self-injury, sleep disturbance and suicidal ideas. The patient is not nervous/anxious and is not hyperactive. Prior to Visit Medications    Medication Sig Taking? Authorizing Provider   linaclotide (LINZESS) 145 MCG capsule Take 1 capsule by mouth every morning (before breakfast) Yes AKBAR Mathews CNP   ciprofloxacin (CIPRO) 250 MG tablet Take 1 tablet by mouth 2 times daily for 3 days Yes AKBAR Mathews CNP   HYDROcodone-acetaminophen (NORCO) 5-325 MG per tablet Take 1 tablet by mouth 2 times daily as needed for Pain for up to 30 days. Yes AKBAR Mathews CNP   simvastatin (ZOCOR) 40 MG tablet Take 1 tablet by mouth nightly Yes Nathan Fernandez MD   nefazodone (SERZONE) 150 MG tablet Take 1 tablet by mouth 2 times daily Yes Nathan Fernandez MD   levothyroxine (SYNTHROID) 125 MCG tablet Take 1 tablet by mouth Daily Indications:  Take DOS Yes Nathan Fernandez MD   rOPINIRole (REQUIP) 1 MG tablet Take 1 tablet by mouth 3 times daily Yes Karolina Louis MD   carbidopa-levodopa (SINEMET)  MG per tablet Take 1 tablet by mouth 5 times daily Yes Karolina Louis MD   celecoxib (CELEBREX) 200 MG capsule Take 200 mg by mouth 2 times daily  Yes Historical Provider, MD   traZODone (DESYREL) 50 MG tablet Take 100 mg by mouth nightly  Yes Historical Provider, MD   amLODIPine (NORVASC) 10 MG tablet Take 10 mg by mouth daily. Indications: take DOS Yes Historical Provider, MD   omeprazole (PRILOSEC) 20 MG capsule Take 20 mg by mouth daily. Indications: take dos Yes Historical Provider, MD       No Known Allergies    OBJECTIVE:      BP Readings from Last 2 Encounters:   10/15/20 112/64   09/23/20 111/70       Wt Readings from Last 3 Encounters:   10/15/20 129 lb 3.2 oz (58.6 kg)   09/23/20 115 lb (52.2 kg)   06/23/20 115 lb (52.2 kg)       Physical Exam  Vitals signs reviewed. Constitutional:       General: She is not in acute distress. Appearance: Normal appearance. She is well-developed. HENT:      Head: Normocephalic and atraumatic. Right Ear: Hearing and external ear normal.      Left Ear: Hearing and external ear normal.      Nose: Nose normal.      Right Sinus: No maxillary sinus tenderness or frontal sinus tenderness. Left Sinus: No maxillary sinus tenderness or frontal sinus tenderness. Mouth/Throat:      Pharynx: No oropharyngeal exudate. Eyes:      Pupils: Pupils are equal, round, and reactive to light. Neck:      Musculoskeletal: Normal range of motion. Thyroid: No thyromegaly. Vascular: No JVD. Trachea: No tracheal deviation. Cardiovascular:      Rate and Rhythm: Normal rate and regular rhythm. Heart sounds: Normal heart sounds. No murmur. No friction rub. No gallop. Pulmonary:      Effort: Pulmonary effort is normal. No respiratory distress. Breath sounds: Normal breath sounds. No stridor. No decreased breath sounds, wheezing, rhonchi or rales. Chest:      Chest wall: No tenderness. Lymphadenopathy:      Cervical: No cervical adenopathy. Skin:     General: Skin is warm and dry. Capillary Refill: Capillary refill takes less than 2 seconds. Findings: No rash. Neurological:      Mental Status: She is alert and oriented to person, place, and time. Sensory: Sensation is intact. Motor: Motor function is intact.       Coordination: Coordination

## 2020-10-16 ENCOUNTER — TELEPHONE (OUTPATIENT)
Dept: FAMILY MEDICINE CLINIC | Age: 75
End: 2020-10-16

## 2020-10-16 LAB — URINE CULTURE, ROUTINE: NORMAL

## 2020-10-16 RX ORDER — FUROSEMIDE 20 MG/1
20 TABLET ORAL DAILY PRN
Qty: 7 TABLET | Refills: 0 | Status: SHIPPED | OUTPATIENT
Start: 2020-10-16 | End: 2020-12-04 | Stop reason: SDUPTHER

## 2020-10-16 NOTE — TELEPHONE ENCOUNTER
You saw the patient yesterday for a UTI. She said that this morning her ankles are very swollen. They're larger than normal.  Just thought she should let you know. Does she need to do anything? Pls advise.

## 2020-10-16 NOTE — TELEPHONE ENCOUNTER
I just placed an order for lasix daily PRN. Only take one a day when ankles are swollen more than normal. Instruct her this will make her urinate more.

## 2020-10-19 RX ORDER — CELECOXIB 200 MG/1
200 CAPSULE ORAL 2 TIMES DAILY
Qty: 180 CAPSULE | Refills: 1 | Status: SHIPPED | OUTPATIENT
Start: 2020-10-19 | End: 2021-07-28 | Stop reason: SDUPTHER

## 2020-10-19 RX ORDER — AMLODIPINE BESYLATE 10 MG/1
10 TABLET ORAL DAILY
Qty: 90 TABLET | Refills: 3 | Status: SHIPPED | OUTPATIENT
Start: 2020-10-19 | End: 2021-03-29 | Stop reason: SDUPTHER

## 2020-10-30 RX ORDER — HYDROCODONE BITARTRATE AND ACETAMINOPHEN 5; 325 MG/1; MG/1
1 TABLET ORAL 2 TIMES DAILY PRN
Qty: 60 TABLET | Refills: 0 | Status: SHIPPED | OUTPATIENT
Start: 2020-10-30 | End: 2020-11-30 | Stop reason: SDUPTHER

## 2020-11-02 RX ORDER — TRAZODONE HYDROCHLORIDE 50 MG/1
100 TABLET ORAL NIGHTLY
Qty: 60 TABLET | Refills: 1 | Status: SHIPPED | OUTPATIENT
Start: 2020-11-02 | End: 2021-01-27 | Stop reason: SDUPTHER

## 2020-11-30 RX ORDER — HYDROCODONE BITARTRATE AND ACETAMINOPHEN 5; 325 MG/1; MG/1
1 TABLET ORAL 2 TIMES DAILY PRN
Qty: 60 TABLET | Refills: 0 | Status: SHIPPED | OUTPATIENT
Start: 2020-11-30 | End: 2020-12-29 | Stop reason: SDUPTHER

## 2020-11-30 NOTE — TELEPHONE ENCOUNTER
Date of last refill of this med was 10/30/2020, # of pills given 60 and # of refills given 0. Their next appointment is 1/28/2021, the last date patient was seen was 10/15/2020. Does patient have medication agreement on file? No  Has drug screen been done in last 12 months if needed?  no

## 2020-12-04 RX ORDER — FUROSEMIDE 20 MG/1
20 TABLET ORAL DAILY PRN
Qty: 7 TABLET | Refills: 1 | Status: SHIPPED | OUTPATIENT
Start: 2020-12-04 | End: 2020-12-28 | Stop reason: SDUPTHER

## 2020-12-09 ENCOUNTER — TELEPHONE (OUTPATIENT)
Dept: FAMILY MEDICINE CLINIC | Age: 75
End: 2020-12-09

## 2020-12-09 NOTE — TELEPHONE ENCOUNTER
aMuricio called and states that [facility] denied order for A [facility] once per day. Need to addend the last note stating how long she's been incontinent for to show that this is long term. Fax addended note to [phone].

## 2020-12-14 RX ORDER — ROPINIROLE 1 MG/1
1 TABLET, FILM COATED ORAL 3 TIMES DAILY
Qty: 270 TABLET | Refills: 0 | Status: SHIPPED | OUTPATIENT
Start: 2020-12-14 | End: 2021-03-29 | Stop reason: SDUPTHER

## 2020-12-14 NOTE — TELEPHONE ENCOUNTER
Last seen 09.23.20    Last office note states to RTO 6 months/03.2021    Next appointment scheduled for 03.24.21

## 2020-12-14 NOTE — TELEPHONE ENCOUNTER
Pt called requesting a refill on her Ropinirole. Use [employer] (on file). Last appt was 9/23/20. Next appt is 3/24/21. Please advise. Thank you.

## 2020-12-16 ENCOUNTER — TELEPHONE (OUTPATIENT)
Dept: FAMILY MEDICINE CLINIC | Age: 75
End: 2020-12-16

## 2020-12-28 ENCOUNTER — TELEPHONE (OUTPATIENT)
Dept: FAMILY MEDICINE CLINIC | Age: 75
End: 2020-12-28

## 2020-12-28 RX ORDER — FUROSEMIDE 20 MG/1
20 TABLET ORAL DAILY PRN
Qty: 20 TABLET | Refills: 1 | Status: SHIPPED | OUTPATIENT
Start: 2020-12-28 | End: 2022-05-06

## 2020-12-28 NOTE — TELEPHONE ENCOUNTER
She said it's her ankles and feet mostly. The water pills helps during the day but not at night.   Call her cell

## 2020-12-28 NOTE — TELEPHONE ENCOUNTER
Ok to send as refill request.  Looks like has appt in a mo.   rec sooner appt if sxs worsen in the interim

## 2020-12-28 NOTE — TELEPHONE ENCOUNTER
Pt called requesting a refill on her Carbidopa-Levodopa  mg tablet. Use [employer] (on file). Last appt was 9/23/20. Next appt is 3/24/21. Please advise. Thank you.

## 2020-12-29 ENCOUNTER — TELEPHONE (OUTPATIENT)
Dept: FAMILY MEDICINE CLINIC | Age: 75
End: 2020-12-29

## 2020-12-29 RX ORDER — HYDROCODONE BITARTRATE AND ACETAMINOPHEN 5; 325 MG/1; MG/1
1 TABLET ORAL 2 TIMES DAILY PRN
Qty: 60 TABLET | Refills: 0 | Status: SHIPPED | OUTPATIENT
Start: 2020-12-29 | End: 2021-01-29 | Stop reason: SDUPTHER

## 2020-12-29 NOTE — TELEPHONE ENCOUNTER
They asked for addendum to the note from October and you did it but dated it the date of service. Needs to be dated the date the addendum was done and faxed to [phone].

## 2020-12-29 NOTE — TELEPHONE ENCOUNTER
Date of last refill of this med was 11/30/2020, # of pills given 60 and # of refills given 0. Their next appointment is 2/1/2021, the last date patient was seen was 10/15/2020. Does patient have medication agreement on file? No  Has drug screen been done in last 12 months if needed?  N/A

## 2020-12-31 ENCOUNTER — TELEPHONE (OUTPATIENT)
Dept: FAMILY MEDICINE CLINIC | Age: 75
End: 2020-12-31

## 2020-12-31 RX ORDER — CIPROFLOXACIN 250 MG/1
250 TABLET, FILM COATED ORAL 2 TIMES DAILY
Qty: 10 TABLET | Refills: 0 | Status: SHIPPED | OUTPATIENT
Start: 2020-12-31 | End: 2021-01-05

## 2020-12-31 NOTE — TELEPHONE ENCOUNTER
Patient calling with painful urination and frequency, wanting to know if something can be sent to [facility]

## 2021-01-27 RX ORDER — TRAZODONE HYDROCHLORIDE 50 MG/1
100 TABLET ORAL NIGHTLY
Qty: 60 TABLET | Refills: 1 | Status: SHIPPED | OUTPATIENT
Start: 2021-01-27 | End: 2021-03-31

## 2021-01-29 DIAGNOSIS — G20 PD (PARKINSON'S DISEASE) (HCC): ICD-10-CM

## 2021-01-29 RX ORDER — HYDROCODONE BITARTRATE AND ACETAMINOPHEN 5; 325 MG/1; MG/1
1 TABLET ORAL 2 TIMES DAILY PRN
Qty: 60 TABLET | Refills: 0 | Status: SHIPPED | OUTPATIENT
Start: 2021-01-29 | End: 2021-03-01 | Stop reason: SDUPTHER

## 2021-02-01 RX ORDER — LEVOTHYROXINE SODIUM 0.12 MG/1
125 TABLET ORAL DAILY
Qty: 30 TABLET | Refills: 5 | Status: SHIPPED | OUTPATIENT
Start: 2021-02-01 | End: 2021-02-03

## 2021-02-02 ENCOUNTER — OFFICE VISIT (OUTPATIENT)
Dept: FAMILY MEDICINE CLINIC | Age: 76
End: 2021-02-02
Payer: MEDICARE

## 2021-02-02 VITALS
OXYGEN SATURATION: 97 % | HEART RATE: 93 BPM | SYSTOLIC BLOOD PRESSURE: 126 MMHG | TEMPERATURE: 96.6 F | DIASTOLIC BLOOD PRESSURE: 74 MMHG

## 2021-02-02 DIAGNOSIS — I10 HTN (HYPERTENSION), BENIGN: ICD-10-CM

## 2021-02-02 DIAGNOSIS — E78.5 HYPERLIPIDEMIA, UNSPECIFIED HYPERLIPIDEMIA TYPE: ICD-10-CM

## 2021-02-02 DIAGNOSIS — F02.80 DEMENTIA DUE TO PARKINSON'S DISEASE WITHOUT BEHAVIORAL DISTURBANCE (HCC): ICD-10-CM

## 2021-02-02 DIAGNOSIS — G20 DEMENTIA DUE TO PARKINSON'S DISEASE WITHOUT BEHAVIORAL DISTURBANCE (HCC): ICD-10-CM

## 2021-02-02 DIAGNOSIS — E07.9 THYROID DISEASE: ICD-10-CM

## 2021-02-02 DIAGNOSIS — G25.81 RESTLESS LEGS SYNDROME (RLS): ICD-10-CM

## 2021-02-02 DIAGNOSIS — J44.9 COPD, MODERATE (HCC): ICD-10-CM

## 2021-02-02 DIAGNOSIS — M25.472 LEFT ANKLE SWELLING: ICD-10-CM

## 2021-02-02 DIAGNOSIS — G20 PD (PARKINSON'S DISEASE) (HCC): Primary | ICD-10-CM

## 2021-02-02 DIAGNOSIS — F32.0 MILD MAJOR DEPRESSION, SINGLE EPISODE (HCC): ICD-10-CM

## 2021-02-02 DIAGNOSIS — M25.471 RIGHT ANKLE SWELLING: ICD-10-CM

## 2021-02-02 LAB
BASOPHILS ABSOLUTE: 0.1 K/UL (ref 0–0.2)
BASOPHILS RELATIVE PERCENT: 0.9 %
EOSINOPHILS ABSOLUTE: 0.1 K/UL (ref 0–0.6)
EOSINOPHILS RELATIVE PERCENT: 1.4 %
HCT VFR BLD CALC: 37.6 % (ref 36–48)
HEMOGLOBIN: 12.4 G/DL (ref 12–16)
LYMPHOCYTES ABSOLUTE: 1.3 K/UL (ref 1–5.1)
LYMPHOCYTES RELATIVE PERCENT: 18.5 %
MCH RBC QN AUTO: 30.9 PG (ref 26–34)
MCHC RBC AUTO-ENTMCNC: 33 G/DL (ref 31–36)
MCV RBC AUTO: 93.9 FL (ref 80–100)
MONOCYTES ABSOLUTE: 0.4 K/UL (ref 0–1.3)
MONOCYTES RELATIVE PERCENT: 5.8 %
NEUTROPHILS ABSOLUTE: 5 K/UL (ref 1.7–7.7)
NEUTROPHILS RELATIVE PERCENT: 73.4 %
PDW BLD-RTO: 14.4 % (ref 12.4–15.4)
PLATELET # BLD: 207 K/UL (ref 135–450)
PMV BLD AUTO: 9.6 FL (ref 5–10.5)
RBC # BLD: 4 M/UL (ref 4–5.2)
WBC # BLD: 6.8 K/UL (ref 4–11)

## 2021-02-02 PROCEDURE — 1090F PRES/ABSN URINE INCON ASSESS: CPT | Performed by: NURSE PRACTITIONER

## 2021-02-02 PROCEDURE — 4040F PNEUMOC VAC/ADMIN/RCVD: CPT | Performed by: NURSE PRACTITIONER

## 2021-02-02 PROCEDURE — 1036F TOBACCO NON-USER: CPT | Performed by: NURSE PRACTITIONER

## 2021-02-02 PROCEDURE — G8427 DOCREV CUR MEDS BY ELIG CLIN: HCPCS | Performed by: NURSE PRACTITIONER

## 2021-02-02 PROCEDURE — 99214 OFFICE O/P EST MOD 30 MIN: CPT | Performed by: NURSE PRACTITIONER

## 2021-02-02 PROCEDURE — G8400 PT W/DXA NO RESULTS DOC: HCPCS | Performed by: NURSE PRACTITIONER

## 2021-02-02 PROCEDURE — 1123F ACP DISCUSS/DSCN MKR DOCD: CPT | Performed by: NURSE PRACTITIONER

## 2021-02-02 PROCEDURE — G8420 CALC BMI NORM PARAMETERS: HCPCS | Performed by: NURSE PRACTITIONER

## 2021-02-02 PROCEDURE — G8484 FLU IMMUNIZE NO ADMIN: HCPCS | Performed by: NURSE PRACTITIONER

## 2021-02-02 PROCEDURE — 36415 COLL VENOUS BLD VENIPUNCTURE: CPT | Performed by: NURSE PRACTITIONER

## 2021-02-02 PROCEDURE — G8926 SPIRO NO PERF OR DOC: HCPCS | Performed by: NURSE PRACTITIONER

## 2021-02-02 PROCEDURE — 3023F SPIROM DOC REV: CPT | Performed by: NURSE PRACTITIONER

## 2021-02-02 ASSESSMENT — ENCOUNTER SYMPTOMS
EYE ITCHING: 0
ABDOMINAL PAIN: 0
COLOR CHANGE: 0
STRIDOR: 0
NAUSEA: 0
DIARRHEA: 0
VOICE CHANGE: 0
SINUS PAIN: 0
COUGH: 0
BLOOD IN STOOL: 0
SHORTNESS OF BREATH: 0
CHEST TIGHTNESS: 0
RHINORRHEA: 0
EYE DISCHARGE: 0
CHOKING: 0
CONSTIPATION: 0
EYE REDNESS: 0
SORE THROAT: 0
SINUS PRESSURE: 0
TROUBLE SWALLOWING: 0
PHOTOPHOBIA: 0
BACK PAIN: 0
VOMITING: 0
EYE PAIN: 0
WHEEZING: 0

## 2021-02-02 NOTE — PROGRESS NOTES
Patient ID: Alecia Giraldo is a 66 y.o. female who presents today for a Physical Exam.    /74   Pulse 93   Temp 96.6 °F (35.9 °C)   SpO2 97%   Breastfeeding No     HPI     She is here for a routine visit. HTN: She is currently taking amlodipine daily and Lasix PRN. Well controlled in office. Ankle swelling: States she propes her legsa up but toward the end of the day her ankles swell. Lasix helps. She took this last night. Advised to take another lasix today and if her ankles do not go dino she can take another PRN. No more than 40 mg in 24 hours. RLS: She is currently on Requip 3 times a day. States this really helps. Parkinson's: She is currently on Carbidopa-Levodopa 5 times a day. She see's neurology for this. Pain: She is on Celebrex 200 mg BID and Norco 5-325 mg BID PRN. Pain in arms and legs with Parkinson's. Dementia: States she thinks memory is getting a little worse. Hypothyroidism: She is currently on 125 mcg synthroid and is due for labs today in the office. Hypercholesteremia: well controlled on her statin. Insomnia: She is currently taking trazodone. She sleeps really well with this medication. Depression:She is currently on nefazodone? COPD:  Past diagnosed with this but not on any inhalers. She saw Pulmonology but is doing well. GERD: She is currently on Prilosec    Overall she looks pretty good.  She is in a wheelchair in office with a gait belt     Past Medical History:   Diagnosis Date    Blood transfusion reaction 2003    Cancer [facility])     breast cancer    History of blood transfusion     Hypertension     Thyroid disease     Unspecified cerebral artery occlusion with cerebral infarction        Past Surgical History:   Procedure Laterality Date    BREAST SURGERY      lumpectomy    COLONOSCOPY  11/5/2015    2003    COLONOSCOPY  11/5/2015    THORACOTOMY  1984    benign tumor removal    UPPER GASTROINTESTINAL ENDOSCOPY  11/5/15 Family History   Problem Relation Age of Onset   John Meraz Cancer Mother         ovarian and colon    Cancer Father         lung    COPD Brother        Social History     Socioeconomic History    Marital status:      Spouse name: None    Number of children: None    Years of education: None    Highest education level: None   Occupational History    None   Social Needs    Financial resource strain: None    Food insecurity     Worry: None     Inability: None    Transportation needs     Medical: None     Non-medical: None   Tobacco Use    Smoking status: Former Smoker     Packs/day: 1.00     Years: 10.00     Pack years: 10.00     Types: Cigarettes     Quit date: 1970     Years since quittin.1    Smokeless tobacco: Never Used    Tobacco comment: Quit 40 years ago   Substance and Sexual Activity    Alcohol use: Yes     Alcohol/week: 1.0 standard drinks     Types: 1 Cans of beer per week     Comment: 1 beer daily    Drug use: No    Sexual activity: None   Lifestyle    Physical activity     Days per week: None     Minutes per session: None    Stress: None   Relationships    Social connections     Talks on phone: None     Gets together: None     Attends [employer] service: None     Active member of club or organization: None     Attends meetings of clubs or organizations: None     Relationship status: None    Intimate partner violence     Fear of current or ex partner: None     Emotionally abused: None     Physically abused: None     Forced sexual activity: None   Other Topics Concern    None   Social History Narrative    None       No Known Allergies    Current Outpatient Medications   Medication Sig Dispense Refill    levothyroxine (SYNTHROID) 125 MCG tablet Take 1 tablet by mouth Daily Indications: Take DOS 30 tablet 5    HYDROcodone-acetaminophen (NORCO) 5-325 MG per tablet Take 1 tablet by mouth 2 times daily as needed for Pain for up to 30 days.  60 tablet 0  traZODone (DESYREL) 50 MG tablet Take 2 tablets by mouth nightly 60 tablet 1    carbidopa-levodopa (SINEMET)  MG per tablet Take 1 tablet by mouth 5 times daily 450 tablet 0    furosemide (LASIX) 20 MG tablet Take 1 tablet by mouth daily as needed (ankle swelling) 20 tablet 1    rOPINIRole (REQUIP) 1 MG tablet Take 1 tablet by mouth 3 times daily 270 tablet 0    celecoxib (CELEBREX) 200 MG capsule Take 1 capsule by mouth 2 times daily 180 capsule 1    amLODIPine (NORVASC) 10 MG tablet Take 1 tablet by mouth daily Indications: take DOS 90 tablet 3    simvastatin (ZOCOR) 40 MG tablet Take 1 tablet by mouth nightly 90 tablet 2    omeprazole (PRILOSEC) 20 MG capsule Take 20 mg by mouth daily. Indications: take dos      nefazodone (SERZONE) 150 MG tablet Take 1 tablet by mouth 2 times daily (Patient not taking: Reported on 2/2/2021) 180 tablet 1     No current facility-administered medications for this visit. The patient's past medical history, past surgical history, family history, medications, and allergies were all reviewed and updated at appropriate today. Review of Systems   Constitutional: Positive for fatigue. Negative for activity change, appetite change, chills, diaphoresis, fever and unexpected weight change. HENT: Negative for congestion, ear discharge, ear pain, hearing loss, nosebleeds, postnasal drip, rhinorrhea, sinus pressure, sinus pain, sneezing, sore throat, tinnitus, trouble swallowing and voice change. Eyes: Negative for photophobia, pain, discharge, redness and itching. Respiratory: Negative for cough, choking, chest tightness, shortness of breath, wheezing and stridor. Cardiovascular: Positive for leg swelling. Negative for chest pain and palpitations. Gastrointestinal: Negative for abdominal pain, blood in stool, constipation, diarrhea, nausea and vomiting. Endocrine: Negative for cold intolerance, heat intolerance, polydipsia and polyuria. Genitourinary: Negative for difficulty urinating, dysuria, enuresis, flank pain, frequency, hematuria and urgency. Musculoskeletal: Negative for back pain, gait problem, joint swelling, neck pain and neck stiffness. Skin: Negative for color change, pallor, rash and wound. Allergic/Immunologic: Negative for environmental allergies and food allergies. Neurological: Negative for dizziness, tremors, syncope, speech difficulty, weakness, light-headedness, numbness and headaches. Hematological: Negative for adenopathy. Does not bruise/bleed easily. Psychiatric/Behavioral: Negative for agitation, behavioral problems, confusion, decreased concentration, dysphoric mood, hallucinations, self-injury, sleep disturbance and suicidal ideas. The patient is not nervous/anxious and is not hyperactive. Physical Exam  Vitals signs reviewed. Constitutional:       General: She is not in acute distress. Appearance: Normal appearance. She is well-developed. HENT:      Head: Normocephalic and atraumatic. Right Ear: Hearing and external ear normal.      Left Ear: Hearing and external ear normal.      Nose: Nose normal.      Right Sinus: No maxillary sinus tenderness or frontal sinus tenderness. Left Sinus: No maxillary sinus tenderness or frontal sinus tenderness. Mouth/Throat:      Pharynx: No oropharyngeal exudate. Eyes:      General:         Right eye: No discharge. Left eye: No discharge. Conjunctiva/sclera: Conjunctivae normal.      Pupils: Pupils are equal, round, and reactive to light. Neck:      Thyroid: No thyromegaly. Vascular: No JVD. Trachea: No tracheal deviation. Comments: Hard to hold head up  Cardiovascular:      Rate and Rhythm: Normal rate and regular rhythm. Pulses: Normal pulses. Heart sounds: Normal heart sounds. No murmur. No friction rub. Pulmonary:      Effort: Pulmonary effort is normal. No respiratory distress. Breath sounds: No stridor. Examination of the left-lower field reveals rhonchi. Rhonchi (slightl LLL, clears with cough) present. No decreased breath sounds, wheezing or rales. Comments: Slight cleared with cough  Musculoskeletal:         General: Swelling and tenderness present. Right lower leg: 3+ Edema present. Left lower leg: 3+ Edema present. Lymphadenopathy:      Cervical: No cervical adenopathy. Skin:     General: Skin is warm and dry. Capillary Refill: Capillary refill takes less than 2 seconds. Findings: No rash. Neurological:      Mental Status: She is alert and oriented to person, place, and time. Sensory: Sensation is intact. Motor: Motor function is intact. Coordination: Coordination normal.   Psychiatric:         Attention and Perception: Attention and perception normal.         Mood and Affect: Mood normal.         Speech: Speech normal.         Behavior: Behavior normal. Behavior is cooperative. Thought Content: Thought content normal.         Cognition and Memory: Cognition normal.         Judgment: Judgment normal.       Assessment:  Encounter Diagnoses   Name Primary?  PD (Parkinson's disease) ([facility] Yes    D[facility] due to [facility]'s disease without behavioral disturbance [facility]     HTN (hypertension), benign     Restless legs syndrome (RLS)     Thyroid disease     COPD, moderate (HCC)     Mild major depression, single episode (HCC)     Hyperlipidemia, unspecified hyperlipidemia type     Left ankle swelling     Right ankle swelling        Plan:  1. PD (Parkinson's disease) ([facility]    - TSH without Reflex  - T4, Free  - Lipid Panel  - CBC Auto Differential  - Comprehensive Metabolic Panel    2. Dementia due to Parkinson's disease without behavioral disturbance (HCC)    - TSH without Reflex  - T4, Free  - Lipid Panel  - CBC Auto Differential  - Comprehensive Metabolic Panel    3.  HTN (hypertension), benign    - TSH without Reflex - T4, Free  - Lipid Panel  - CBC Auto Differential  - Comprehensive Metabolic Panel    4. Restless legs syndrome (RLS)    - Stable at this time. Doing well on current medical regimen. 5. Thyroid disease    - TSH without Reflex  - T4, Free  -Doing well on medication and due for labs today. 6. COPD, moderate ([address]    -Stable at this time. No current concerns. 7. Mild major depression, single episode ([address]    - Stable at this time. 8. Hyperlipidemia, unspecified hyperlipidemia type    - Lipid Panel  - Stable at this time. Doing well on current medical regimen. 9. Left ankle swelling  - Can take additional PRN dose of Lasix but no more than 40 mg in 24 hours a day. 10. Right ankle swelling    - See above. Discussed keeping feet elevate. d do calf raises when sitting and wear compression socks. Needs Pain management agreement. Overall she is doing well. Will adjust medications if needed per lab results. Suggested follow up in 3 months virtually. Return in about 3 months (around 5/2/2021).

## 2021-02-03 DIAGNOSIS — E03.9 HYPOTHYROIDISM, UNSPECIFIED TYPE: ICD-10-CM

## 2021-02-03 DIAGNOSIS — E07.9 THYROID DISEASE: Primary | ICD-10-CM

## 2021-02-03 LAB
A/G RATIO: 1.5 (ref 1.1–2.2)
ALBUMIN SERPL-MCNC: 4.3 G/DL (ref 3.4–5)
ALP BLD-CCNC: 110 U/L (ref 40–129)
ALT SERPL-CCNC: <5 U/L (ref 10–40)
ANION GAP SERPL CALCULATED.3IONS-SCNC: 10 MMOL/L (ref 3–16)
AST SERPL-CCNC: 13 U/L (ref 15–37)
BILIRUB SERPL-MCNC: 0.4 MG/DL (ref 0–1)
BUN BLDV-MCNC: 10 MG/DL (ref 7–20)
CALCIUM SERPL-MCNC: 9.6 MG/DL (ref 8.3–10.6)
CHLORIDE BLD-SCNC: 105 MMOL/L (ref 99–110)
CHOLESTEROL, TOTAL: 133 MG/DL (ref 0–199)
CO2: 27 MMOL/L (ref 21–32)
CREAT SERPL-MCNC: 1.1 MG/DL (ref 0.6–1.2)
GFR AFRICAN AMERICAN: 58
GFR NON-AFRICAN AMERICAN: 48
GLOBULIN: 2.9 G/DL
GLUCOSE BLD-MCNC: 98 MG/DL (ref 70–99)
HDLC SERPL-MCNC: 81 MG/DL (ref 40–60)
LDL CHOLESTEROL CALCULATED: 38 MG/DL
POTASSIUM SERPL-SCNC: 4.3 MMOL/L (ref 3.5–5.1)
SODIUM BLD-SCNC: 142 MMOL/L (ref 136–145)
T4 FREE: 2.2 NG/DL (ref 0.9–1.8)
TOTAL PROTEIN: 7.2 G/DL (ref 6.4–8.2)
TRIGL SERPL-MCNC: 68 MG/DL (ref 0–150)
TSH SERPL DL<=0.05 MIU/L-ACNC: <0.01 UIU/ML (ref 0.27–4.2)
VLDLC SERPL CALC-MCNC: 14 MG/DL

## 2021-02-03 RX ORDER — LEVOTHYROXINE SODIUM 112 UG/1
112 TABLET ORAL DAILY
Qty: 30 TABLET | Refills: 3 | Status: SHIPPED | OUTPATIENT
Start: 2021-02-03 | End: 2021-09-07 | Stop reason: SDUPTHER

## 2021-02-24 ENCOUNTER — TELEPHONE (OUTPATIENT)
Dept: FAMILY MEDICINE CLINIC | Age: 76
End: 2021-02-24

## 2021-02-24 DIAGNOSIS — R32 URINARY INCONTINENCE, UNSPECIFIED TYPE: ICD-10-CM

## 2021-02-24 DIAGNOSIS — G20 DEMENTIA DUE TO PARKINSON'S DISEASE WITHOUT BEHAVIORAL DISTURBANCE (HCC): ICD-10-CM

## 2021-02-24 DIAGNOSIS — G20 PD (PARKINSON'S DISEASE) (HCC): Primary | ICD-10-CM

## 2021-02-24 DIAGNOSIS — R53.1 WEAKNESS: ICD-10-CM

## 2021-02-24 DIAGNOSIS — F02.80 DEMENTIA DUE TO PARKINSON'S DISEASE WITHOUT BEHAVIORAL DISTURBANCE (HCC): ICD-10-CM

## 2021-02-24 NOTE — TELEPHONE ENCOUNTER
Patient  called, he had tried to get patient out of bed she slid to the floor she is weak and very stiff and unable to move, they had to call squad to get her up and back in bed.  He is wanting to know if there is any way he can get her into [facility] because he can not care for her

## 2021-02-24 NOTE — TELEPHONE ENCOUNTER
Appears she was seen approx 3 weeks ago. rec he call [facility] to check on admission options. Often, 3 day stay in hospital may be required for admission and medicare to cover. If that is the case, may need ER.

## 2021-02-24 NOTE — TELEPHONE ENCOUNTER
Patient called in wanting referral for home care,  has called [facility] and is waiting on them to call back

## 2021-03-01 DIAGNOSIS — G20 PD (PARKINSON'S DISEASE) (HCC): ICD-10-CM

## 2021-03-01 DIAGNOSIS — K59.09 CHRONIC CONSTIPATION: ICD-10-CM

## 2021-03-01 RX ORDER — HYDROCODONE BITARTRATE AND ACETAMINOPHEN 5; 325 MG/1; MG/1
1 TABLET ORAL 2 TIMES DAILY PRN
Qty: 60 TABLET | Refills: 0 | Status: SHIPPED | OUTPATIENT
Start: 2021-03-01 | End: 2021-03-31 | Stop reason: SDUPTHER

## 2021-03-01 NOTE — TELEPHONE ENCOUNTER
Katelyn andrews/ [employer] ([phone]) called requesting an order for hha to bathe pt once weekly for 4 weeks. Also needing order for ot eval    Date of last refill of this med was 1/29/2021, # of pills given 60 and # of refills given 0. Their next appointment is 7/6/21, the last date patient was seen was 2/2/21. Does patient have medication agreement on file? Yes  Has drug screen been done in last 12 months if needed?  no

## 2021-03-04 ENCOUNTER — TELEPHONE (OUTPATIENT)
Dept: FAMILY MEDICINE CLINIC | Age: 76
End: 2021-03-04

## 2021-03-04 NOTE — TELEPHONE ENCOUNTER
Charlie (OT) with [facility] called and states that she went to see Myriam Juares today for a OT evaluation and noticed that her right hand and b/l legs are swollen. She was unsure if this was normal for Myriam Juares as she has not seen her before but wanted to report this.

## 2021-03-08 ENCOUNTER — TELEPHONE (OUTPATIENT)
Dept: FAMILY MEDICINE CLINIC | Age: 76
End: 2021-03-08

## 2021-03-08 NOTE — TELEPHONE ENCOUNTER
Pt is requesting a refill of her Amlodipine. States that she's taking 10mg 2 per day so she's ran out early. She said that she thought you changed it from 1 to 2 per day. I looked and don't see where that was increased. Could you pls clarify how she's suppose to be taking this and then we'll need to call the pharmacy to get her more medication.

## 2021-03-08 NOTE — TELEPHONE ENCOUNTER
Informed pt. I spoke with Mike Salcedo at [facility] and pt will have to pay cash since she has ran out early, INS will not cover.

## 2021-03-29 DIAGNOSIS — G25.81 RESTLESS LEGS SYNDROME (RLS): ICD-10-CM

## 2021-03-29 DIAGNOSIS — G20 PD (PARKINSON'S DISEASE) (HCC): ICD-10-CM

## 2021-03-29 RX ORDER — ROPINIROLE 1 MG/1
1 TABLET, FILM COATED ORAL 3 TIMES DAILY
Qty: 270 TABLET | Refills: 0 | Status: SHIPPED | OUTPATIENT
Start: 2021-03-29 | End: 2021-10-05 | Stop reason: SDUPTHER

## 2021-03-29 RX ORDER — AMLODIPINE BESYLATE 10 MG/1
10 TABLET ORAL DAILY
Qty: 90 TABLET | Refills: 3 | Status: SHIPPED | OUTPATIENT
Start: 2021-03-29 | End: 2022-05-09

## 2021-03-29 NOTE — TELEPHONE ENCOUNTER
Last seen 09.23.20    Last office note states to RTO 6 months/03.2021    Next appointment scheduled for 04.08.21

## 2021-03-31 ENCOUNTER — NURSE ONLY (OUTPATIENT)
Dept: FAMILY MEDICINE CLINIC | Age: 76
End: 2021-03-31
Payer: MEDICARE

## 2021-03-31 DIAGNOSIS — E03.9 HYPOTHYROIDISM, UNSPECIFIED TYPE: ICD-10-CM

## 2021-03-31 DIAGNOSIS — E07.9 THYROID DISEASE: ICD-10-CM

## 2021-03-31 DIAGNOSIS — G20 PD (PARKINSON'S DISEASE) (HCC): ICD-10-CM

## 2021-03-31 LAB
T4 FREE: 1.9 NG/DL (ref 0.9–1.8)
TSH SERPL DL<=0.05 MIU/L-ACNC: 0.02 UIU/ML (ref 0.27–4.2)

## 2021-03-31 PROCEDURE — 36415 COLL VENOUS BLD VENIPUNCTURE: CPT | Performed by: FAMILY MEDICINE

## 2021-03-31 RX ORDER — HYDROCODONE BITARTRATE AND ACETAMINOPHEN 5; 325 MG/1; MG/1
1 TABLET ORAL 2 TIMES DAILY PRN
Qty: 60 TABLET | Refills: 0 | Status: SHIPPED | OUTPATIENT
Start: 2021-03-31 | End: 2021-07-19

## 2021-03-31 RX ORDER — TRAZODONE HYDROCHLORIDE 50 MG/1
100 TABLET ORAL NIGHTLY
Qty: 60 TABLET | Refills: 1 | Status: SHIPPED | OUTPATIENT
Start: 2021-03-31 | End: 2021-09-07 | Stop reason: SDUPTHER

## 2021-03-31 NOTE — PROGRESS NOTES
Blood drawn per order. Needle size: 23 g  Site: L Basilic. First attempt successful Yes    Second attempt no    Pressure applied until bleeding stopped. Yes applied. Patient informed to call office or return if bleeding reoccurs and unable to stop.     Tubes drawn: 0 purple     1 red

## 2021-03-31 NOTE — TELEPHONE ENCOUNTER
Date of last refill of this med was 3/1/2021, # of pills given 60 and # of refills given 0. Their next appointment is 4/8/2021, the last date patient was seen was 2/2/2021. Does patient have medication agreement on file? Yes  Has drug screen been done in last 12 months if needed?  N/A

## 2021-04-01 NOTE — RESULT ENCOUNTER NOTE
tsh still suppressed and ft4 mildly elevated, but improved from prior.  If patrice current meds, ok to cont at current thyroid dose and rpt labs in 4 mo or so

## 2021-04-08 ENCOUNTER — OFFICE VISIT (OUTPATIENT)
Dept: NEUROLOGY | Age: 76
End: 2021-04-08
Payer: MEDICARE

## 2021-04-08 ENCOUNTER — OFFICE VISIT (OUTPATIENT)
Dept: FAMILY MEDICINE CLINIC | Age: 76
End: 2021-04-08
Payer: MEDICARE

## 2021-04-08 VITALS
TEMPERATURE: 97.9 F | BODY MASS INDEX: 19.16 KG/M2 | HEART RATE: 89 BPM | DIASTOLIC BLOOD PRESSURE: 78 MMHG | SYSTOLIC BLOOD PRESSURE: 129 MMHG | HEIGHT: 65 IN | OXYGEN SATURATION: 98 % | WEIGHT: 115 LBS

## 2021-04-08 VITALS
SYSTOLIC BLOOD PRESSURE: 128 MMHG | HEART RATE: 96 BPM | WEIGHT: 130 LBS | OXYGEN SATURATION: 96 % | BODY MASS INDEX: 21.66 KG/M2 | HEIGHT: 65 IN | DIASTOLIC BLOOD PRESSURE: 84 MMHG

## 2021-04-08 DIAGNOSIS — F02.80 DEMENTIA DUE TO PARKINSON'S DISEASE WITHOUT BEHAVIORAL DISTURBANCE (HCC): ICD-10-CM

## 2021-04-08 DIAGNOSIS — M25.561 ACUTE PAIN OF RIGHT KNEE: Primary | ICD-10-CM

## 2021-04-08 DIAGNOSIS — G20 PD (PARKINSON'S DISEASE) (HCC): Primary | ICD-10-CM

## 2021-04-08 DIAGNOSIS — I10 HTN (HYPERTENSION), BENIGN: ICD-10-CM

## 2021-04-08 DIAGNOSIS — G20 PD (PARKINSON'S DISEASE) (HCC): ICD-10-CM

## 2021-04-08 DIAGNOSIS — G20 DEMENTIA DUE TO PARKINSON'S DISEASE WITHOUT BEHAVIORAL DISTURBANCE (HCC): ICD-10-CM

## 2021-04-08 DIAGNOSIS — E78.5 DYSLIPIDEMIA: ICD-10-CM

## 2021-04-08 DIAGNOSIS — G25.81 RESTLESS LEGS SYNDROME (RLS): ICD-10-CM

## 2021-04-08 DIAGNOSIS — E03.9 HYPOTHYROIDISM, UNSPECIFIED TYPE: ICD-10-CM

## 2021-04-08 PROCEDURE — 99214 OFFICE O/P EST MOD 30 MIN: CPT | Performed by: PSYCHIATRY & NEUROLOGY

## 2021-04-08 PROCEDURE — 4040F PNEUMOC VAC/ADMIN/RCVD: CPT | Performed by: FAMILY MEDICINE

## 2021-04-08 PROCEDURE — G8400 PT W/DXA NO RESULTS DOC: HCPCS | Performed by: FAMILY MEDICINE

## 2021-04-08 PROCEDURE — 1090F PRES/ABSN URINE INCON ASSESS: CPT | Performed by: FAMILY MEDICINE

## 2021-04-08 PROCEDURE — G8427 DOCREV CUR MEDS BY ELIG CLIN: HCPCS | Performed by: FAMILY MEDICINE

## 2021-04-08 PROCEDURE — 1036F TOBACCO NON-USER: CPT | Performed by: FAMILY MEDICINE

## 2021-04-08 PROCEDURE — G8400 PT W/DXA NO RESULTS DOC: HCPCS | Performed by: PSYCHIATRY & NEUROLOGY

## 2021-04-08 PROCEDURE — G8420 CALC BMI NORM PARAMETERS: HCPCS | Performed by: PSYCHIATRY & NEUROLOGY

## 2021-04-08 PROCEDURE — G8427 DOCREV CUR MEDS BY ELIG CLIN: HCPCS | Performed by: PSYCHIATRY & NEUROLOGY

## 2021-04-08 PROCEDURE — 1123F ACP DISCUSS/DSCN MKR DOCD: CPT | Performed by: PSYCHIATRY & NEUROLOGY

## 2021-04-08 PROCEDURE — 1123F ACP DISCUSS/DSCN MKR DOCD: CPT | Performed by: FAMILY MEDICINE

## 2021-04-08 PROCEDURE — 1036F TOBACCO NON-USER: CPT | Performed by: PSYCHIATRY & NEUROLOGY

## 2021-04-08 PROCEDURE — G8420 CALC BMI NORM PARAMETERS: HCPCS | Performed by: FAMILY MEDICINE

## 2021-04-08 PROCEDURE — 1090F PRES/ABSN URINE INCON ASSESS: CPT | Performed by: PSYCHIATRY & NEUROLOGY

## 2021-04-08 PROCEDURE — 4040F PNEUMOC VAC/ADMIN/RCVD: CPT | Performed by: PSYCHIATRY & NEUROLOGY

## 2021-04-08 PROCEDURE — 99213 OFFICE O/P EST LOW 20 MIN: CPT | Performed by: FAMILY MEDICINE

## 2021-04-08 ASSESSMENT — ENCOUNTER SYMPTOMS: SHORTNESS OF BREATH: 0

## 2021-04-08 NOTE — PROGRESS NOTES
Chief Complaint   Patient presents with    Knee Pain       HPI:  Kelin Tong is a 66 y.o. (: 1945) here today   for right knee pain after injury 2 weeks ago. Knee Pain   The incident occurred more than 1 week ago (2 weeks ago). The incident occurred at home. The injury mechanism was a twisting injury. The pain is present in the right knee. The quality of the pain is described as aching. Associated symptoms include an inability to bear weight. The symptoms are aggravated by weight bearing. She has tried acetaminophen (hydrocodone.  ) for the symptoms. The treatment provided mild relief. was transferring from lift chair to wheel chair. Caught in between. Had some swelling. Swelling improved. Had to stop PT due to knee pain. Worse w/ weight bearing. Knee did give out once. No sig locking. Seen by neurology today. Sinemet increased. Recent thyroid labs. patrice meds. Improved.  will be having shoulder surgery soon. Will have difficulty caring for her. Considering ECF placement.  typically helps w/ transfers, bathing, meals, getting dressed, other ADL's. Patient's medications, allergies, past medical, surgical, social and family histories were reviewed and updated as appropriate. ROS:  Review of Systems   Constitutional: Negative for fever. Respiratory: Negative for shortness of breath. Musculoskeletal: Positive for arthralgias and joint swelling. Prior to Visit Medications    Medication Sig Taking? Authorizing Provider   carbidopa-levodopa (SINEMET)  MG per tablet Take 1 tablet by mouth 6 times daily Yes Ni Burgess MD   traZODone (DESYREL) 50 MG tablet TAKE 2 TABLETS BY MOUTH NIGHTLY Yes Pedro Carlin MD   HYDROcodone-acetaminophen (NORCO) 5-325 MG per tablet Take 1 tablet by mouth 2 times daily as needed for Pain for up to 30 days.  Yes Fernando Fan, APRN - CNP   rOPINIRole (REQUIP) 1 MG tablet Take 1 tablet by mouth 3 given duration of sxs. May need ortho referral.    - XR KNEE RIGHT (3 VIEWS); Future    2. PD (Parkinson's disease) ([facility]  Seen by neuro earlier today. meds adjusted. May need ecf placement when  has shoulder surgery, as he typically helps w/ all of her ADL's.      3. Hypothyroidism, unspecified type  Improved based on labs. Cont meds.

## 2021-04-08 NOTE — PROGRESS NOTES
CNP   levothyroxine (SYNTHROID) 112 MCG tablet Take 1 tablet by mouth Daily Indications: Take DOS Yes AKBAR Moya - CNP   furosemide (LASIX) 20 MG tablet Take 1 tablet by mouth daily as needed (ankle swelling) Yes Abbey Escobar MD   celecoxib (CELEBREX) 200 MG capsule Take 1 capsule by mouth 2 times daily Yes Abbey Escobar MD   simvastatin (ZOCOR) 40 MG tablet Take 1 tablet by mouth nightly Yes Abbey Escobar MD   omeprazole (PRILOSEC) 20 MG capsule Take 20 mg by mouth daily. Indications: take dos Yes Historical Provider, MD     No Known Allergies  Social History     Tobacco Use    Smoking status: Former Smoker     Packs/day: 1.00     Years: 10.00     Pack years: 10.00     Types: Cigarettes     Quit date: 1970     Years since quittin.3    Smokeless tobacco: Never Used    Tobacco comment: Quit 40 years ago   Substance Use Topics    Alcohol use: Yes     Alcohol/week: 1.0 standard drinks     Types: 1 Cans of beer per week     Comment: 1 beer daily       ROS : A 10-12 system review of constitutional, cardiovascular, respiratory, musculoskeletal, endocrine, skin, SHEENT, genitourinary, psychiatric and neurologic systems was obtained and updated today and is unremarkable except as mentioned in my HPI        Exam:   Constitutional:   Vitals:    21 1302   BP: 129/78   Pulse: 89   Temp: 97.9 °F (36.6 °C)   TempSrc: Infrared   SpO2: 98%   Weight: 115 lb (52.2 kg)   Height: 5' 5\" (1.651 m)     General appearance:  Normal development and appear in no acute distress. Mental Status: the same  Age x2 today  Poor immediate recall  Poor insight  Language is fluent but soft  Good attention today. Cranial Nerves:   II:  Pupils: equal, round, reactive to light  III,IV,VI: Extra Ocular Movements are intact.  No nystagmus  VII: Facial strength and movements: intact and symmetric  XI: Shoulder shrug is intact  XII: Tongue movements are normal  Musculoskeletal: The same generalized diffuse weakness 4/5 with poor effort. No tremors today  Poor REM and mild cogwheeling rigidity  Truncal ataxia and leaning toward the right side. Sensation is normal  Unable to walk today due to fatigue and weakness. Medical decision making: moderate    I personally reviewed and updated social history, past medical history, medications, allergy, surgical history, and family history as documented in the patient's electronic health records. Labs other test results reviewed and discussed with the patient and her . Recent TSH was 0.02 and free T4 1.9. Obtained history from her  as well. Reviewed notes from other physicians. Provided patient education regarding risk, benefits and treatment options as well as adherence to medication regimen and side effect from these medications. No change      Assessment:   Diagnosis Orders   1. PD (Parkinson's disease) ([facility]  carbidopa-levodopa (SINEMET)  MG per tablet   2. Dementia due to Parkinson's disease without behavioral disturbance ([facility]     3. Restless legs syndrome (RLS)     4. Dyslipidemia     5. HTN (hypertension), benign       Chronic cognitive impairment, dementia with parkinsonism. Not controlled. Seems worse compared to last visit. History of RLS  Hyperlipidemia  Hypertension      Plan:  Increase Sinemet to  mg 6 times daily  Discussed side effect  6-month refill  Continue Requip the same dose 1 mg 3 times daily  Continue with PT and OT  Fall precaution at home and continue with her walker at all times  Agree with possible ECF placement to improve her ADL as she is requiring more assistance from her .   Continue current blood pressure medication  Monitor blood pressure at home  Continue statin  Aspirin for stroke prevention

## 2021-04-08 NOTE — PATIENT INSTRUCTIONS
Patient Education        Meniscus Tear: Exercises  Introduction  Here are some examples of exercises for you to try. The exercises may be suggested for a condition or for rehabilitation. Start each exercise slowly. Ease off the exercises if you start to have pain. You will be told when to start these exercises and which ones will work best for you. How to do the exercises  Calf wall stretch   1. Stand facing a wall with your hands on the wall at about eye level. Put your affected leg about a step behind your other leg. 2. Keeping your back leg straight and your back heel on the floor, bend your front knee and gently bring your hip and chest toward the wall until you feel a stretch in the calf of your back leg. 3. Hold the stretch for at least 15 to 30 seconds. 4. Repeat 2 to 4 times. 5. Repeat steps 1 through 4, but this time keep your back knee bent. Hamstring wall stretch   1. Lie on your back in a doorway, with your good leg through the open door. 2. Slide your affected leg up the wall to straighten your knee. You should feel a gentle stretch down the back of your leg. 3. Hold the stretch for at least 1 minute. Then over time, try to lengthen the time you hold the stretch to as long as 6 minutes. 4. Repeat 2 to 4 times. 5. If you do not have a place to do this exercise in a doorway, there is another way to do it:  6. Lie on your back, and bend your affected leg. 7. Loop a towel under the ball and toes of that foot, and hold the ends of the towel in your hands. 8. Straighten your knee, and slowly pull back on the towel. You should feel a gentle stretch down the back of your leg. 9. Hold the stretch for at least 15 to 30 seconds. Or even better, hold the stretch for 1 minute if you can. 10. Repeat 2 to 4 times. 1. Do not arch your back. 2. Do not bend either knee. 3. Keep one heel touching the floor and the other heel touching the wall. Do not point your toes. Quad sets   1.  Sit with your leg straight and supported on the floor or a firm bed. (If you feel discomfort in the front or back of your knee, place a small towel roll under your knee.)  2. Tighten the muscles on top of your thigh by pressing the back of your knee flat down to the floor. (If you feel discomfort under your kneecap, place a small towel roll under your knee.)  3. Hold for about 6 seconds, then rest up to 10 seconds. 4. Do 8 to 12 repetitions several times a day. Straight-leg raises to the front   1. Lie on your back with your good knee bent so that your foot rests flat on the floor. Your injured leg should be straight. Make sure that your low back has a normal curve. You should be able to slip your flat hand in between the floor and the small of your back, with your palm touching the floor and your back touching the back of your hand. 2. Tighten the thigh muscles in the injured leg by pressing the back of your knee flat down to the floor. Hold your knee straight. 3. Keeping the thigh muscles tight, lift your injured leg up so that your heel is about 12 inches off the floor. Hold for 5 seconds and then lower slowly. 4. Do 8 to 12 repetitions. Straight-leg raises to the back   1. Lie on your stomach, and lift your leg straight up behind you (toward the ceiling). 2. Lift your toes about 6 inches off the floor, hold for about 6 seconds, then lower slowly. 3. Do 8 to 12 repetitions. Hamstring curls   1. Lie on your stomach with your knees straight. If your kneecap is uncomfortable, roll up a washcloth and put it under your leg just above your kneecap. 2. Lift the foot of your injured leg by bending the knee so that you bring the foot up toward your buttock. If this motion hurts, try it without bending your knee quite as far. This may help you avoid any painful motion. 3. Slowly lower your leg back to the floor. 4. Do 8 to 12 repetitions.   5. With permission from your doctor or physical therapist, you may also want to add a cuff weight to your ankle (not more than 5 pounds). With weight, you do not have to lift your leg more than 12 inches to get a hamstring workout. Wall slide with ball squeeze   1. Stand with your back against a wall and with your feet about shoulder-width apart. Your feet should be about 12 inches away from the wall. 2. Put a ball about the size of a soccer ball between your knees. Then slowly slide down the wall until your knees are bent about 20 to 30 degrees. 3. Tighten your thigh muscles by squeezing the ball between your knees. Hold that position for about 10 seconds, then stop squeezing. Rest for up to 10 seconds between repetitions. 4. Repeat 8 to 12 times. Heel raises   1. Stand with your feet a few inches apart, with your hands lightly resting on a counter or chair in front of you. 2. Slowly raise your heels off the floor while keeping your knees straight. 3. Hold for about 6 seconds, then slowly lower your heels to the floor. 4. Do 8 to 12 repetitions several times during the day. Heel dig bridging   Stop doing this exercise if it causes pain. 1. Lie on your back with both knees bent and your ankles bent so that only your heels are digging into the floor. Your knees should be bent about 90 degrees. 2. Then push your heels into the floor, squeeze your buttocks, and lift your hips off the floor until your shoulders, hips, and knees are all in a straight line. 3. Hold for about 6 seconds as you continue to breathe normally, and then slowly lower your hips back down to the floor and rest for up to 10 seconds. 4. Do 8 to 12 repetitions. Shallow standing knee bends   Do this exercise only if you have very little pain; if you have no clicking, locking, or giving way in the injured knee; and if it does not hurt while you are doing 8 to 12 repetitions. 1. Stand with your hands lightly resting on a counter or chair in front of you. Put your feet shoulder-width apart.   2. Slowly bend your knees so that you squat down like you are going to sit in a chair. Make sure your knees do not go in front of your toes. 3. Lower yourself about 6 inches. Your heels should remain on the floor at all times. 4. Rise slowly to a standing position. Follow-up care is a key part of your treatment and safety. Be sure to make and go to all appointments, and call your doctor if you are having problems. It's also a good idea to know your test results and keep a list of the medicines you take. Where can you learn more? Go to [email or web address] org and sign in to your [employer] account. Enter C183 in the [employer] box to learn more about \"Meniscus Tear: Exercises. \"     If you do not have an account, please click on the \"Sign Up Now\" link. Current as of: November 16, 2020               Content Version: 12.8  © 2006-2021 Healthwise, Incorporated. Care instructions adapted under license by [facility] ([facility]). If you have questions about a medical condition or this instruction, always ask your healthcare professional. [place] [address] any warranty or liability for your use of this information.

## 2021-04-09 DIAGNOSIS — M25.561 ACUTE PAIN OF RIGHT KNEE: Primary | ICD-10-CM

## 2021-04-09 NOTE — RESULT ENCOUNTER NOTE
Increased density in the bone to the lateral aspect of the tibia. This could be related to arthritis versus impacted fracture. Recommend orthopedic referral for further evaluation.

## 2021-05-20 ENCOUNTER — TELEPHONE (OUTPATIENT)
Dept: FAMILY MEDICINE CLINIC | Age: 76
End: 2021-05-20

## 2021-06-02 ENCOUNTER — TELEPHONE (OUTPATIENT)
Dept: FAMILY MEDICINE CLINIC | Age: 76
End: 2021-06-02

## 2021-06-08 ENCOUNTER — TELEPHONE (OUTPATIENT)
Dept: FAMILY MEDICINE CLINIC | Age: 76
End: 2021-06-08

## 2021-06-08 NOTE — TELEPHONE ENCOUNTER
Pt was in yesterday with some forms that he's responsible for filling out from an , [place], [id number] [facility]. He is wanting her placed in a NH and the paperwork prevents the state from taking property, etc.   He asked me to send records. I informed him that we usually don't do that until we receive a records request stating what they need from us first.  He went ahead and signed a release here giving us permission to send records. Wasn't sure what they needed though. Asked me to call the 's office and see what they need. He said that he has a meeting with them in a few weeks. I called atty and spoke with Rayshawn Maldonado [address]. She said that they don't need anything as of yet and this was explained to [employer]. Will have patient sign a release with their office and then we can send records.    SANDRA

## 2021-06-24 NOTE — TELEPHONE ENCOUNTER
Order placed. Benign prostatic hypertrophy    Depression    Endogenous hyperlipemia    Hypertension

## 2021-07-09 ENCOUNTER — TELEPHONE (OUTPATIENT)
Dept: FAMILY MEDICINE CLINIC | Age: 76
End: 2021-07-09

## 2021-07-09 NOTE — TELEPHONE ENCOUNTER
I am not the admitting physician. Physician caring for her at [facility] may be able to justify extending stay.   Not sure how I could provide approp documentation when I have not seen her recently

## 2021-07-09 NOTE — TELEPHONE ENCOUNTER
Patient time is up in [facility],  has had 2 recent surgeries and is unable to care for her at home, he contacted [facility] and they said if the admitting md could contact [facility] and give justification of longer stay they would approve.  Is this something we do or does Dr. Patrice Chou do this (seen him for fx in leg)

## 2021-07-12 ENCOUNTER — TELEPHONE (OUTPATIENT)
Dept: FAMILY MEDICINE CLINIC | Age: 76
End: 2021-07-12

## 2021-07-12 NOTE — TELEPHONE ENCOUNTER
[facility] Trans[facility] Initial Follow Up Call    Outreach made within 2 business days of discharge: Yes    Patient: Julissa Lemons Patient : 1945   MRN: <[id number]>  Reason for Admission: No discharge information exists for this patient. Discharge Date:   2021       Spoke with: Fish Marie    Discharge department/facility: [facility] Patient Contact:  Was patient able to fill all prescriptions: Yes  Was patient instructed to bring all medications to the follow-up visit: Yes  Is patient taking all medications as directed in the discharge summary?  Yes  Does patient understand their discharge instructions: Yes  Does patient have questions or concerns that need addressed prior to 7-14 day follow up office visit: no    Scheduled appointment with PCP within 7-14 days    Follow Up  Future Appointments   Date Time Provider Aravind Mcneill   2021 10:40 AM Casimiro Howell MD [facility]   2021  1:00 PM Wilkie Bosworth, MD AND [facility]       Diomedes Villalobos MA

## 2021-07-19 ENCOUNTER — OFFICE VISIT (OUTPATIENT)
Dept: FAMILY MEDICINE CLINIC | Age: 76
End: 2021-07-19
Payer: MEDICARE

## 2021-07-19 VITALS
SYSTOLIC BLOOD PRESSURE: 144 MMHG | DIASTOLIC BLOOD PRESSURE: 82 MMHG | WEIGHT: 133.2 LBS | HEART RATE: 88 BPM | BODY MASS INDEX: 22.17 KG/M2 | OXYGEN SATURATION: 95 %

## 2021-07-19 DIAGNOSIS — Z09 HOSPITAL DISCHARGE FOLLOW-UP: ICD-10-CM

## 2021-07-19 DIAGNOSIS — G89.29 CHRONIC LEFT SHOULDER PAIN: ICD-10-CM

## 2021-07-19 DIAGNOSIS — S82.144S: ICD-10-CM
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
## 2021-07-19 DIAGNOSIS — M25.512 CHRONIC LEFT SHOULDER PAIN: ICD-10-CM

## 2021-07-19 PROCEDURE — 99495 TRANSJ CARE MGMT MOD F2F 14D: CPT | Performed by: NURSE PRACTITIONER

## 2021-07-19 PROCEDURE — 1111F DSCHRG MED/CURRENT MED MERGE: CPT | Performed by: NURSE PRACTITIONER

## 2021-07-19 RX ORDER — CELECOXIB 200 MG/1
200 CAPSULE ORAL 2 TIMES DAILY
Qty: 180 CAPSULE | Refills: 1 | Status: CANCELLED | OUTPATIENT
Start: 2021-07-19

## 2021-07-19 ASSESSMENT — ENCOUNTER SYMPTOMS
COUGH: 0
BACK PAIN: 0
EYE REDNESS: 0
COLOR CHANGE: 0
ABDOMINAL PAIN: 0
SINUS PRESSURE: 0
CHEST TIGHTNESS: 0
STRIDOR: 0
SINUS PAIN: 0
SHORTNESS OF BREATH: 0
PHOTOPHOBIA: 0
CHOKING: 0
DIARRHEA: 0
VOICE CHANGE: 0
VOMITING: 0
RHINORRHEA: 0
TROUBLE SWALLOWING: 0
EYE PAIN: 0
WHEEZING: 0
EYE ITCHING: 0
CONSTIPATION: 0
NAUSEA: 0
BLOOD IN STOOL: 0
SORE THROAT: 0
EYE DISCHARGE: 0

## 2021-07-19 NOTE — PROGRESS NOTES
Post-Discharge Transitional Care Management Services or Hospital Follow Up      Maru Briones   YOB: 1945    Date of Office Visit:  7/19/2021  Date of Hospital Admission: [address]  4/21/21 for a fall and right knee fracture  Date of Hospital Discharge: [address] 5/27/21 to [facility] on 7/13/21    Care management risk score Rising risk (score 2-5) and Complex Care (Scores >=6): 1     Non face to face  following discharge, date last encounter closed (first attempt may have been earlier): 7/12/2021  4:16 PM     Call initiated 2 business days of discharge: Yes    Patient Active Problem List   Diagnosis    PD (Parkinson's disease) ([address]    Dementia due to Parkinson's disease without behavioral disturbance ([address]    HTN (hypertension), benign    Dysthymia    Primary insomnia    Restless legs syndrome (RLS)    Remote history of stroke    Urinary incontinence    Thyroid disease    COPD, moderate ([address]    Mild major depression, single episode ([address]    Dyslipidemia     She is starting to have home health come twice a week. She is ambulating with her walker. She has put away all tripping hazards put away. UTI has thus cleared with Cipro, no current issues. She is having some joint pain. No new medicaiotns per patient. Reviewed labs drawn in hospital. Overall concerns is pain management. Her arthritis  Is hurting her and Celebrex is not taking care of her pain. Left shoulder is the worst and wakes her up in the morning. She does not want to go to [facility] to try injections. Will call if she wants referral to ortho shoulder for further workup. Has not had imaging. States she will continue current dose of Celebrex and take Tylenol PRN.       No Known Allergies    Medications marked \"taking\" at this time  Outpatient Medications Marked as Taking for the 7/19/21 encounter (Office Visit) with AKBAR Garcia CNP   Medication Sig Dispense Refill    carbidopa-levodopa (SINEMET)  MG per tablet Take 1 tablet by mouth 6 times daily 540 tablet 1    traZODone (DESYREL) 50 MG tablet TAKE 2 TABLETS BY MOUTH NIGHTLY 60 tablet 1    rOPINIRole (REQUIP) 1 MG tablet Take 1 tablet by mouth 3 times daily 270 tablet 0    amLODIPine (NORVASC) 10 MG tablet Take 1 tablet by mouth daily Indications: take DOS 90 tablet 3    levothyroxine (SYNTHROID) 112 MCG tablet Take 1 tablet by mouth Daily Indications: Take DOS 30 tablet 3    furosemide (LASIX) 20 MG tablet Take 1 tablet by mouth daily as needed (ankle swelling) 20 tablet 1    celecoxib (CELEBREX) 200 MG capsule Take 1 capsule by mouth 2 times daily 180 capsule 1    simvastatin (ZOCOR) 40 MG tablet Take 1 tablet by mouth nightly 90 tablet 2    omeprazole (PRILOSEC) 20 MG capsule Take 20 mg by mouth daily. Indications: take dos          Medications patient taking as of now reconciled against medications ordered at time of hospital discharge: Yes    Chief Complaint   Patient presents with    Follow-Up from Hospital       HPI    Inpatient course: Discharge summary reviewed- see chart. Interval history/Current status:     Vitals:    07/19/21 0956   BP: (!) 144/82   Pulse: 88   SpO2: 95%   Weight: 133 lb 3.2 oz (60.4 kg)     Body mass index is 22.17 kg/m². Wt Readings from Last 3 Encounters:   07/19/21 133 lb 3.2 oz (60.4 kg)   04/08/21 130 lb (59 kg)   04/08/21 115 lb (52.2 kg)     BP Readings from Last 3 Encounters:   07/19/21 (!) 144/82   04/08/21 128/84   04/08/21 129/78       Review of Systems   Constitutional: Positive for activity change. Negative for appetite change, chills, diaphoresis, fatigue, fever and unexpected weight change. HENT: Negative for congestion, ear discharge, ear pain, hearing loss, nosebleeds, postnasal drip, rhinorrhea, sinus pressure, sinus pain, sneezing, sore throat, tinnitus, trouble swallowing and voice change. Eyes: Negative for photophobia, pain, discharge, redness and itching.    Respiratory: Negative for cough, choking, chest tightness, shortness of breath, wheezing and stridor. Cardiovascular: Negative for chest pain, palpitations and leg swelling. Gastrointestinal: Negative for abdominal pain, blood in stool, constipation, diarrhea, nausea and vomiting. Endocrine: Negative for cold intolerance, heat intolerance, polydipsia and polyuria. Genitourinary: Negative for difficulty urinating, dysuria, enuresis, flank pain, frequency, hematuria and urgency. Musculoskeletal: Positive for arthralgias and gait problem. Negative for back pain, joint swelling, neck pain and neck stiffness. Skin: Negative for color change, pallor, rash and wound. Allergic/Immunologic: Negative for environmental allergies and food allergies. Neurological: Negative for dizziness, tremors, syncope, speech difficulty, weakness, light-headedness, numbness and headaches. Hematological: Negative for adenopathy. Does not bruise/bleed easily. Psychiatric/Behavioral: Negative for agitation, behavioral problems, confusion, decreased concentration, dysphoric mood, hallucinations, self-injury, sleep disturbance and suicidal ideas. The patient is not nervous/anxious and is not hyperactive. Physical Exam  Vitals reviewed. Constitutional:       General: She is not in acute distress. Appearance: Normal appearance. She is well-developed. HENT:      Head: Normocephalic and atraumatic. Right Ear: Hearing and external ear normal.      Left Ear: Hearing and external ear normal.      Nose: Nose normal.      Right Sinus: No maxillary sinus tenderness or frontal sinus tenderness. Left Sinus: No maxillary sinus tenderness or frontal sinus tenderness. Mouth/Throat:      Pharynx: No oropharyngeal exudate. Eyes:      General:         Right eye: No discharge. Left eye: No discharge. Conjunctiva/sclera: Conjunctivae normal.   Neck:      Thyroid: No thyromegaly. Vascular: No JVD.       Trachea: No tracheal deviation. Cardiovascular:      Rate and Rhythm: Normal rate and regular rhythm. Heart sounds: Normal heart sounds. No murmur heard. No friction rub. Pulmonary:      Effort: Pulmonary effort is normal. No respiratory distress. Breath sounds: Normal breath sounds. No stridor. No decreased breath sounds, wheezing, rhonchi or rales. Musculoskeletal:         General: Tenderness present. Normal range of motion. Arms:       Cervical back: Normal range of motion. Lymphadenopathy:      Cervical: No cervical adenopathy. Skin:     General: Skin is warm and dry. Capillary Refill: Capillary refill takes less than 2 seconds. Findings: No rash. Neurological:      Mental Status: She is alert and oriented to person, place, and time. Sensory: Sensation is intact. Motor: Motor function is intact. Coordination: Coordination normal.   Psychiatric:         Attention and Perception: Attention and perception normal.         Mood and Affect: Mood normal.         Speech: Speech normal.         Behavior: Behavior normal. Behavior is cooperative. Thought Content: Thought content normal.         Cognition and Memory: Cognition normal.         Judgment: Judgment normal.       Assessment/Plan:  1. Fall, sequela    - [place] DISCHARGE MEDS RECONCILED W/ CURRENT OUTPATIENT MED LIST    2. Hospital discharge follow-up    - Reviewed past notes, meds and labs. Will consider getting CBC and CMP at 2 week follow  Up. Overall feeling ok. She is ambulating with a walker. 3. Chronic left shoulder pain    - Has not had imaging or formally examined, tender to touch on exam but strength intact and full ROM. She does not want to go to [facility] to get it evaluated. Will call back if she changes her mind. 4. Closed nondisplaced bicondylar fracture of right tibia, sequela    - She is doing PT/OT and home health is going to come twice a week. Will follow up in office in 2 weeks.  Follow up if symptoms do not improve or worsen. If the patient becomes short of breath go straight to the ER or call 911.     Medical Decision Making: moderate complexity

## 2021-07-28 RX ORDER — CELECOXIB 200 MG/1
200 CAPSULE ORAL 2 TIMES DAILY
Qty: 180 CAPSULE | Refills: 1 | Status: SHIPPED | OUTPATIENT
Start: 2021-07-28 | End: 2022-02-07 | Stop reason: SDUPTHER

## 2021-08-23 ENCOUNTER — TELEPHONE (OUTPATIENT)
Dept: FAMILY MEDICINE CLINIC | Age: 76
End: 2021-08-23

## 2021-08-23 DIAGNOSIS — R05.9 COUGH: Primary | ICD-10-CM

## 2021-08-23 LAB
INTERNAL QC: NORMAL
SARS-COV-2, NAA: POSITIVE

## 2021-08-23 NOTE — TELEPHONE ENCOUNTER
Pt would like to be tested for COVID Rapid at [facility]. Symptoms are cough,loss of voice, aches all over, no fever. Symptoms x 3 days. She was exposed by her brother on 8/14.

## 2021-08-23 NOTE — RESULT ENCOUNTER NOTE
Covid positive. Symptomatic treatment. ER if worsening sob. Duration of isolation and precautions   For most persons with COVID-19 illness, isolation and precautions can generally be discontinued 10 days after symptom onset1 and resolution of fever for at least 24 hours, without the use of fever-reducing medications, and with improvement of other symptoms. A limited number of persons with severe illness may produce replication-competent virus beyond 10 days that may warrant extending duration of isolation and precautions for up to 20 days after symptom onset; consider consultation with infection control experts. For persons who never develop symptoms, isolation and other precautions can be discontinued 10 days after the date of their first positive RT-PCR test for SARS-CoV-2 RNA.

## 2021-09-07 RX ORDER — TRAZODONE HYDROCHLORIDE 50 MG/1
100 TABLET ORAL NIGHTLY
Qty: 60 TABLET | Refills: 1 | Status: SHIPPED | OUTPATIENT
Start: 2021-09-07 | End: 2021-11-15 | Stop reason: SDUPTHER

## 2021-09-07 RX ORDER — LEVOTHYROXINE SODIUM 112 UG/1
112 TABLET ORAL DAILY
Qty: 90 TABLET | Refills: 3 | Status: SHIPPED | OUTPATIENT
Start: 2021-09-07 | End: 2022-05-23

## 2021-09-13 RX ORDER — SIMVASTATIN 40 MG
40 TABLET ORAL NIGHTLY
Qty: 90 TABLET | Refills: 2 | Status: SHIPPED | OUTPATIENT
Start: 2021-09-13 | End: 2022-07-18

## 2021-09-20 ENCOUNTER — TELEPHONE (OUTPATIENT)
Dept: FAMILY MEDICINE CLINIC | Age: 76
End: 2021-09-20

## 2021-09-20 NOTE — TELEPHONE ENCOUNTER
Pt called and left a message on the answering machine. States that she has a UTI and is wanting meds.

## 2021-09-27 ENCOUNTER — TELEPHONE (OUTPATIENT)
Dept: NEUROLOGY | Age: 76
End: 2021-09-27

## 2021-09-27 NOTE — TELEPHONE ENCOUNTER
Pt called requesting a refill on her Carbidopa-levodopa (Use [employer] file) she said she ran out last week & that her prescription bottle still says 1 tablet 5 times a day vs. The 1 tablet 6 times a day (although the RX we sent says 1 tablet 6 times a day 4/8/21). I called [facility], spoke to [place], he said pt got her last refill 3/29/21 for #450, she never got the new script we sent 4/8/21 filled. Advised him to go ahead & fill and I will advise pt. I called pt back & advised her that she never picked up her new script we sent 4/8/21 & that her pharmacy is getting the newer one filled for her today. Pt said okay. Thank you.

## 2021-10-05 ENCOUNTER — OFFICE VISIT (OUTPATIENT)
Dept: NEUROLOGY | Age: 76
End: 2021-10-05
Payer: MEDICARE

## 2021-10-05 VITALS
OXYGEN SATURATION: 97 % | BODY MASS INDEX: 22.17 KG/M2 | DIASTOLIC BLOOD PRESSURE: 82 MMHG | HEART RATE: 76 BPM | HEIGHT: 65 IN | SYSTOLIC BLOOD PRESSURE: 126 MMHG

## 2021-10-05 DIAGNOSIS — G25.81 RESTLESS LEGS SYNDROME (RLS): ICD-10-CM

## 2021-10-05 DIAGNOSIS — G20 PD (PARKINSON'S DISEASE) (HCC): Primary | ICD-10-CM

## 2021-10-05 DIAGNOSIS — G20 DEMENTIA DUE TO PARKINSON'S DISEASE WITHOUT BEHAVIORAL DISTURBANCE (HCC): ICD-10-CM

## 2021-10-05 DIAGNOSIS — F02.80 DEMENTIA DUE TO PARKINSON'S DISEASE WITHOUT BEHAVIORAL DISTURBANCE (HCC): ICD-10-CM

## 2021-10-05 PROBLEM — M62.81 MUSCLE WEAKNESS: Status: ACTIVE | Noted: 2021-10-05

## 2021-10-05 PROCEDURE — G8400 PT W/DXA NO RESULTS DOC: HCPCS | Performed by: PSYCHIATRY & NEUROLOGY

## 2021-10-05 PROCEDURE — 1123F ACP DISCUSS/DSCN MKR DOCD: CPT | Performed by: PSYCHIATRY & NEUROLOGY

## 2021-10-05 PROCEDURE — G8484 FLU IMMUNIZE NO ADMIN: HCPCS | Performed by: PSYCHIATRY & NEUROLOGY

## 2021-10-05 PROCEDURE — G8427 DOCREV CUR MEDS BY ELIG CLIN: HCPCS | Performed by: PSYCHIATRY & NEUROLOGY

## 2021-10-05 PROCEDURE — G8420 CALC BMI NORM PARAMETERS: HCPCS | Performed by: PSYCHIATRY & NEUROLOGY

## 2021-10-05 PROCEDURE — 1036F TOBACCO NON-USER: CPT | Performed by: PSYCHIATRY & NEUROLOGY

## 2021-10-05 PROCEDURE — 4040F PNEUMOC VAC/ADMIN/RCVD: CPT | Performed by: PSYCHIATRY & NEUROLOGY

## 2021-10-05 PROCEDURE — 99214 OFFICE O/P EST MOD 30 MIN: CPT | Performed by: PSYCHIATRY & NEUROLOGY

## 2021-10-05 PROCEDURE — 1090F PRES/ABSN URINE INCON ASSESS: CPT | Performed by: PSYCHIATRY & NEUROLOGY

## 2021-10-05 RX ORDER — METHYLPHENIDATE HYDROCHLORIDE 10 MG/1
TABLET ORAL
COMMUNITY
Start: 2021-09-27

## 2021-10-05 RX ORDER — DULOXETIN HYDROCHLORIDE 60 MG/1
CAPSULE, DELAYED RELEASE ORAL
COMMUNITY
Start: 2021-09-13

## 2021-10-05 RX ORDER — ROPINIROLE 1 MG/1
1 TABLET, FILM COATED ORAL 3 TIMES DAILY
Qty: 270 TABLET | Refills: 1 | Status: SHIPPED | OUTPATIENT
Start: 2021-10-05 | End: 2022-05-16 | Stop reason: SDUPTHER

## 2021-10-05 RX ORDER — ARIPIPRAZOLE 5 MG/1
TABLET ORAL
COMMUNITY
Start: 2021-07-21

## 2021-10-05 NOTE — PROGRESS NOTES
tablet Take 1 tablet by mouth 6 times daily Yes Luca Cabrera MD   amLODIPine (NORVASC) 10 MG tablet Take 1 tablet by mouth daily Indications: take DOS Yes AKBAR Goff CNP   furosemide (LASIX) 20 MG tablet Take 1 tablet by mouth daily as needed (ankle swelling) Yes Ivelisse , MD   omeprazole (PRILOSEC) 20 MG capsule Take 20 mg by mouth daily. Indications: take dos Yes Historical Provider, MD     No Known Allergies  Social History     Tobacco Use    Smoking status: Former Smoker     Packs/day: 1.00     Years: 10.00     Pack years: 10.00     Types: Cigarettes     Quit date: 1970     Years since quittin.7    Smokeless tobacco: Never Used    Tobacco comment: Quit 40 years ago   Substance Use Topics    Alcohol use: Yes     Alcohol/week: 1.0 standard drinks     Types: 1 Cans of beer per week     Comment: 1 beer daily       ROS : A 10-12 system review of constitutional, cardiovascular, respiratory, musculoskeletal, endocrine, skin, SHEENT, genitourinary, psychiatric and neurologic systems was obtained and updated today and is unremarkable except as mentioned in my HPI        Exam:   Constitutional:   Vitals:    10/05/21 1230   BP: 126/82   Site: Left Wrist   Position: Sitting   Pulse: 76   SpO2: 97%   Height: 5' 5\" (1.651 m)     General appearance:  Normal development and appear in no acute distress. Mental Status: the same  AAO x2 today  Poor immediate recall  Poor insight  Language is fluent but soft  Good attention today. Cranial Nerves:   II:  Pupils: equal, round, reactive to light  III,IV,VI: Extra Ocular Movements are intact. No nystagmus  VII: Facial strength and movements: intact and symmetric  XI: Shoulder shrug is intact  XII: Tongue movements are normal  Musculoskeletal: The same generalized diffuse weakness 4/5 with poor effort. No tremors today  Poor REM and decreased range of motion her leg and cogwheeling rigidity. Truncal ataxia and leaning toward the right side. Abnormal posturing. Sensation is normal  Unable to walk today due to fatigue and weakness. She is about the same    Medical decision making: moderate    I personally reviewed and updated social history, past medical history, medications, allergy, surgical history, and family history as documented in the patient's electronic health records. Labs other test results reviewed and discussed with the patient and her . Recent TSH was 0.02 and free T4 1.9. Obtained history from her  as well. Reviewed notes from other physicians. Provided patient education regarding risk, benefits and treatment options as well as adherence to medication regimen and side effect from these medications. No change      Assessment:   Diagnosis Orders   1. PD (Parkinson's disease) [facility])  Ambulatory referral to Physical Therapy    [facility]    rOPINIRole (REQUIP) 1 MG tablet   2. Dementia due to Parkinson's disease without behavioral disturbance ([facility]     3. Restless legs syndrome (RLS)  rOPINIRole (REQUIP) 1 MG tablet     Chronic cognitive impairment, dementia with parkinsonism. No change   history of RLS  Hyperlipidemia  Hypertension      Plan:  Continue with Sinemet   mg 6 times daily  Refill for medication  Continue Requip 1 mg x 3  Refill for Requip  Refer to PT, OT and speech  Long discussion with the patient and  regarding outcome from Parkinson disease, risk of falling and side effect of medications  Discussed risk of sudden death with Abilify and black box warning  Follow precaution  Improve postural stability at home and use her walker at all times  Continue aspirin  Statin  Continue blood pressure control and current medications  Statin  Follow-up 6 months.

## 2021-11-15 RX ORDER — TRAZODONE HYDROCHLORIDE 50 MG/1
100 TABLET ORAL NIGHTLY
Qty: 60 TABLET | Refills: 1 | Status: SHIPPED | OUTPATIENT
Start: 2021-11-15 | End: 2022-01-21 | Stop reason: SDUPTHER

## 2021-11-15 NOTE — TELEPHONE ENCOUNTER
/Date of last refill of this med was 9/7/21 # of pills given 60 and # of refillsgiven 1. Their next appointment is None, the last date patient was seen was 7/19/21. Does patient have medication agreement on file? Yes  Has drug screen been done in last 12 months if needed?  N/A

## 2021-12-07 ENCOUNTER — OFFICE VISIT (OUTPATIENT)
Dept: FAMILY MEDICINE CLINIC | Age: 76
End: 2021-12-07
Payer: MEDICARE

## 2021-12-07 VITALS
OXYGEN SATURATION: 95 % | WEIGHT: 128 LBS | HEART RATE: 89 BPM | SYSTOLIC BLOOD PRESSURE: 124 MMHG | BODY MASS INDEX: 21.3 KG/M2 | DIASTOLIC BLOOD PRESSURE: 72 MMHG

## 2021-12-07 DIAGNOSIS — R39.9 UTI SYMPTOMS: Primary | ICD-10-CM

## 2021-12-07 LAB
BILIRUBIN, POC: NORMAL
BLOOD URINE, POC: NORMAL
CLARITY, POC: NORMAL
COLOR, POC: YELLOW
GLUCOSE URINE, POC: NORMAL
KETONES, POC: NORMAL
LEUKOCYTE EST, POC: NORMAL
NITRITE, POC: NORMAL
PH, POC: 7
PROTEIN, POC: 30
SPECIFIC GRAVITY, POC: 1.02
UROBILINOGEN, POC: 0.2

## 2021-12-07 PROCEDURE — G8420 CALC BMI NORM PARAMETERS: HCPCS

## 2021-12-07 PROCEDURE — 81002 URINALYSIS NONAUTO W/O SCOPE: CPT

## 2021-12-07 PROCEDURE — 1123F ACP DISCUSS/DSCN MKR DOCD: CPT

## 2021-12-07 PROCEDURE — 1090F PRES/ABSN URINE INCON ASSESS: CPT

## 2021-12-07 PROCEDURE — G8400 PT W/DXA NO RESULTS DOC: HCPCS

## 2021-12-07 PROCEDURE — 4040F PNEUMOC VAC/ADMIN/RCVD: CPT

## 2021-12-07 PROCEDURE — 1036F TOBACCO NON-USER: CPT

## 2021-12-07 PROCEDURE — 99213 OFFICE O/P EST LOW 20 MIN: CPT

## 2021-12-07 PROCEDURE — G8484 FLU IMMUNIZE NO ADMIN: HCPCS

## 2021-12-07 PROCEDURE — G8427 DOCREV CUR MEDS BY ELIG CLIN: HCPCS

## 2021-12-07 RX ORDER — NITROFURANTOIN 25; 75 MG/1; MG/1
100 CAPSULE ORAL 2 TIMES DAILY
Qty: 20 CAPSULE | Refills: 0 | Status: SHIPPED | OUTPATIENT
Start: 2021-12-07 | End: 2021-12-17

## 2021-12-07 ASSESSMENT — ENCOUNTER SYMPTOMS
WHEEZING: 0
DIARRHEA: 0
COUGH: 0
SHORTNESS OF BREATH: 0
CONSTIPATION: 0
RESPIRATORY NEGATIVE: 1
NAUSEA: 0

## 2021-12-07 NOTE — PROGRESS NOTES
Chief Complaint   Patient presents with    Urinary Frequency     urgency, painful urination for 3 days        HPI:  Lyric Sylvester is a 64 y.o. (: 1945) here today   for x3 days of urinary urgency, frequency, painful urination. States it burns when she urinates. Has had some very mild abd cramping since symptoms started. Denies fever. Denies seeing any blood in urine. Is increasing water intake last couple day. Unable to leave urine sample. Pt  states he saw some pink color in urine yesterday when helping her off toilet. Patient's medications, allergies, past medical, surgical, social and family histories were reviewed and updated asappropriate. ROS:  Review of Systems   Constitutional: Negative for activity change, appetite change, chills and fever. HENT: Negative. Respiratory: Negative. Negative for cough, shortness of breath and wheezing. Cardiovascular: Negative. Negative for chest pain, palpitations and leg swelling. Gastrointestinal: Negative for constipation, diarrhea and nausea. Some mild abd cramping     Genitourinary: Positive for frequency, pelvic pain and urgency. Negative for flank pain and hematuria. Musculoskeletal: Positive for arthralgias and gait problem. Skin: Negative. Neurological: Negative for dizziness and light-headedness. Psychiatric/Behavioral: Negative for agitation. Prior to Visit Medications    Medication Sig Taking?  Authorizing Provider   nitrofurantoin, macrocrystal-monohydrate, (MACROBID) 100 MG capsule Take 1 capsule by mouth 2 times daily for 10 days Yes AKBAR Alas CNP   traZODone (DESYREL) 50 MG tablet Take 2 tablets by mouth nightly Yes AKBAR Alas CNP   ARIPiprazole (ABILIFY) 5 MG tablet  Yes Historical Provider, MD   DULoxetine (CYMBALTA) 60 MG extended release capsule  Yes Historical Provider, MD   methylphenidate (RITALIN) 10 MG tablet  Yes Historical Provider, MD   rOPINIRole (REQUIP) 1 MG tablet Take 1 tablet by mouth 3 times daily Yes Luca Reid MD   simvastatin (ZOCOR) 40 MG tablet Take 1 tablet by mouth nightly Yes Korey Bis, APRN - CNP   celecoxib (CELEBREX) 200 MG capsule Take 1 capsule by mouth 2 times daily Yes Korey Bis, APRN - CNP   carbidopa-levodopa (SINEMET)  MG per tablet Take 1 tablet by mouth 6 times daily Yes Luca Cabrera MD   amLODIPine (NORVASC) 10 MG tablet Take 1 tablet by mouth daily Indications: take DOS Yes Korey Elia, APRN - CNP   omeprazole (PRILOSEC) 20 MG capsule Take 20 mg by mouth daily. Indications: take dos Yes Fiorella Rivas MD   levothyroxine (SYNTHROID) 112 MCG tablet Take 1 tablet by mouth Daily Indications: Take DOS  Dario López MD   furosemide (LASIX) 20 MG tablet Take 1 tablet by mouth daily as needed (ankle swelling)  Dario López MD       No Known Allergies    OBJECTIVE:    /72   Pulse 89   Wt 128 lb (58.1 kg)   SpO2 95%   BMI 21.30 kg/m²     BP Readings from Last 2 Encounters:   12/07/21 124/72   10/05/21 126/82       Wt Readings from Last 3 Encounters:   12/07/21 128 lb (58.1 kg)   07/19/21 133 lb 3.2 oz (60.4 kg)   04/08/21 130 lb (59 kg)       Physical Exam  Constitutional:       Appearance: Normal appearance. HENT:      Head: Normocephalic and atraumatic. Nose: Nose normal. No congestion. Eyes:      Extraocular Movements: Extraocular movements intact. Pupils: Pupils are equal, round, and reactive to light. Cardiovascular:      Rate and Rhythm: Normal rate and regular rhythm. Pulses: Normal pulses. Heart sounds: Normal heart sounds. No murmur heard. Pulmonary:      Effort: Pulmonary effort is normal. No respiratory distress. Breath sounds: Normal breath sounds. No wheezing. Abdominal:      General: Bowel sounds are normal. There is no distension. Palpations: Abdomen is soft. Tenderness: There is no abdominal tenderness.  There is no right CVA tenderness or left CVA tenderness. Musculoskeletal:         General: Normal range of motion. Cervical back: Normal range of motion and neck supple. Right lower leg: No edema. Left lower leg: No edema. Comments: Walks with walker and has gait belt for  to assist.   Skin:     General: Skin is warm and dry. Capillary Refill: Capillary refill takes less than 2 seconds. Findings: No rash. Neurological:      General: No focal deficit present. Mental Status: She is alert and oriented to person, place, and time. Motor: No weakness. Psychiatric:         Mood and Affect: Mood normal.         Behavior: Behavior normal.           ASSESSMENT/PLAN:    1. UTI symptoms  Patient presents today for complaints of UTI symptoms. States x3 days of urinary frequency, urinary urgency, burning upon urination. Pain states she also gets some mild abdominal cramping since the onset of her symptoms. Patient  states he saw pink-tinged urine in the toilet yesterday. Patient has not had a fever. Patient was unable to leave a urine in office today. Given the patient's symptoms I will go ahead and treat for UTI with antibiotics, see below. Patient given urine cup to take with them because they have the doctor's appointment to get to. Patient states he will bring back urine for us to run to assess for the UTI or if we need to run a urine culture. - URINALYSIS  - nitrofurantoin, macrocrystal-monohydrate, (MACROBID) 100 MG capsule; Take 1 capsule by mouth 2 times daily for 10 days  Dispense: 20 capsule; Refill: 0    This document was prepared by a combination of typing and transcription through a voice recognition software.     AKBAR Bah - CNP

## 2021-12-10 LAB
ORGANISM: ABNORMAL
URINE CULTURE, ROUTINE: ABNORMAL

## 2021-12-30 ENCOUNTER — TELEPHONE (OUTPATIENT)
Dept: FAMILY MEDICINE CLINIC | Age: 76
End: 2021-12-30

## 2021-12-30 RX ORDER — CIPROFLOXACIN 250 MG/1
250 TABLET, FILM COATED ORAL 2 TIMES DAILY
Qty: 10 TABLET | Refills: 0 | Status: SHIPPED | OUTPATIENT
Start: 2021-12-30 | End: 2022-01-04

## 2021-12-30 NOTE — TELEPHONE ENCOUNTER
Based on prior culture, could try Cipro 250 mg twice daily x5 days. She is still taking Macrobid, okay to discontinue.   She should have completed the course by now

## 2022-01-06 ENCOUNTER — TELEPHONE (OUTPATIENT)
Dept: NEUROLOGY | Age: 77
End: 2022-01-06

## 2022-01-06 DIAGNOSIS — G20 PD (PARKINSON'S DISEASE) (HCC): Primary | ICD-10-CM

## 2022-01-06 DIAGNOSIS — F02.80 DEMENTIA DUE TO PARKINSON'S DISEASE WITHOUT BEHAVIORAL DISTURBANCE (HCC): ICD-10-CM

## 2022-01-06 DIAGNOSIS — G20 DEMENTIA DUE TO PARKINSON'S DISEASE WITHOUT BEHAVIORAL DISTURBANCE (HCC): ICD-10-CM

## 2022-01-06 NOTE — TELEPHONE ENCOUNTER
Pt called to see if she could take 8 times daily on her Sinemet instead of the 6 times? She said she would like to take 2 more at night because she feels it wears off toward the evening. LOV: 10/28/21. NOV: 4/5/22. Please advise. Thank you.

## 2022-01-21 RX ORDER — TRAZODONE HYDROCHLORIDE 50 MG/1
100 TABLET ORAL NIGHTLY
Qty: 60 TABLET | Refills: 1 | Status: SHIPPED | OUTPATIENT
Start: 2022-01-21 | End: 2022-02-17 | Stop reason: SDUPTHER

## 2022-02-07 RX ORDER — CELECOXIB 200 MG/1
200 CAPSULE ORAL 2 TIMES DAILY
Qty: 180 CAPSULE | Refills: 1 | Status: SHIPPED | OUTPATIENT
Start: 2022-02-07

## 2022-02-07 NOTE — TELEPHONE ENCOUNTER
Pt needs refill for Celebrex last filled on 07/28/21 send to [facility]

## 2022-02-17 RX ORDER — TRAZODONE HYDROCHLORIDE 50 MG/1
100 TABLET ORAL NIGHTLY
Qty: 60 TABLET | Refills: 1 | Status: SHIPPED | OUTPATIENT
Start: 2022-02-17 | End: 2022-05-23 | Stop reason: SDUPTHER

## 2022-03-21 ENCOUNTER — TELEPHONE (OUTPATIENT)
Dept: FAMILY MEDICINE CLINIC | Age: 77
End: 2022-03-21

## 2022-03-21 DIAGNOSIS — G20 PD (PARKINSON'S DISEASE) (HCC): Primary | ICD-10-CM

## 2022-03-21 RX ORDER — CALCIUM CARBONATE 160(400)MG
1 TABLET,CHEWABLE ORAL DAILY
Qty: 1 EACH | Refills: 0 | Status: SHIPPED | OUTPATIENT
Start: 2022-03-21

## 2022-03-21 NOTE — TELEPHONE ENCOUNTER
Pt needs order for a roleater sent [facility] her old one is broke and [facility] told the pt that they should be able to get a new one since it is so old

## 2022-05-06 ENCOUNTER — OFFICE VISIT (OUTPATIENT)
Dept: NEUROLOGY | Age: 77
End: 2022-05-06
Payer: MEDICARE

## 2022-05-06 VITALS
BODY MASS INDEX: 22.67 KG/M2 | HEIGHT: 63 IN | DIASTOLIC BLOOD PRESSURE: 77 MMHG | HEART RATE: 79 BPM | OXYGEN SATURATION: 96 % | SYSTOLIC BLOOD PRESSURE: 143 MMHG

## 2022-05-06 DIAGNOSIS — E78.5 DYSLIPIDEMIA: ICD-10-CM

## 2022-05-06 DIAGNOSIS — G20 PD (PARKINSON'S DISEASE) (HCC): Primary | ICD-10-CM

## 2022-05-06 DIAGNOSIS — G20 DEMENTIA DUE TO PARKINSON'S DISEASE WITHOUT BEHAVIORAL DISTURBANCE (HCC): ICD-10-CM

## 2022-05-06 DIAGNOSIS — G25.81 RESTLESS LEGS SYNDROME (RLS): ICD-10-CM

## 2022-05-06 DIAGNOSIS — F02.80 DEMENTIA DUE TO PARKINSON'S DISEASE WITHOUT BEHAVIORAL DISTURBANCE (HCC): ICD-10-CM

## 2022-05-06 PROCEDURE — 1036F TOBACCO NON-USER: CPT | Performed by: PSYCHIATRY & NEUROLOGY

## 2022-05-06 PROCEDURE — 1090F PRES/ABSN URINE INCON ASSESS: CPT | Performed by: PSYCHIATRY & NEUROLOGY

## 2022-05-06 PROCEDURE — G8400 PT W/DXA NO RESULTS DOC: HCPCS | Performed by: PSYCHIATRY & NEUROLOGY

## 2022-05-06 PROCEDURE — 99214 OFFICE O/P EST MOD 30 MIN: CPT | Performed by: PSYCHIATRY & NEUROLOGY

## 2022-05-06 PROCEDURE — 4040F PNEUMOC VAC/ADMIN/RCVD: CPT | Performed by: PSYCHIATRY & NEUROLOGY

## 2022-05-06 PROCEDURE — 1123F ACP DISCUSS/DSCN MKR DOCD: CPT | Performed by: PSYCHIATRY & NEUROLOGY

## 2022-05-06 PROCEDURE — G8420 CALC BMI NORM PARAMETERS: HCPCS | Performed by: PSYCHIATRY & NEUROLOGY

## 2022-05-06 PROCEDURE — G8427 DOCREV CUR MEDS BY ELIG CLIN: HCPCS | Performed by: PSYCHIATRY & NEUROLOGY

## 2022-05-06 NOTE — PROGRESS NOTES
The patient came today for follow-up regarding parkinsonism and dementia  . Since her last visit, she increased her Sinemet  mg tablets up to 2 tablets every 6 hours. She feels better with such regimen. No side effect. She lives with her . She walks with her walker. She is not very active throughout the day. No recent falling but she is somewhat stiff when she gets up and walk. She takes Requip 1 Mg 3 times daily. No evidence of dyskinesia or excessive movement. No frequent insomnia, parasomnia or psychosis at night. The same chronic cognitive impairment but able to manage throughout the day. She is on SSRI and Abilify at night. She has not had any recent physical therapy or occupational therapy. She denies any speech or swallowing issues. No other new symptoms today. Other review of system was unremarkable    Past Medical History:   Diagnosis Date    Blood transfusion reaction 2003    Cancer [facility])     breast cancer    History of blood transfusion     Hypertension     Thyroid disease     Unspecified cerebral artery occlusion with cerebral infarction      Prior to Visit Medications    Medication Sig Taking? Authorizing Provider   Misc.  Devices (ROLLATOR ULTRA-LIGHT) MISC 1 each by Does not apply route daily Yes Casimiro Howell MD   celecoxib (CELEBREX) 200 MG capsule Take 1 capsule by mouth 2 times daily Yes Casimiro Howell MD   carbidopa-levodopa (SINEMET)  MG per tablet Take 1 tablet by mouth 8 times daily  Patient taking differently: Take 2 tablets by mouth 4 times daily  Yes Wilkie Bosworth, MD   ARIPiprazole (ABILIFY) 5 MG tablet  Yes Historical Provider, MD   DULoxetine (CYMBALTA) 60 MG extended release capsule  Yes Historical Provider, MD   methylphenidate (RITALIN) 10 MG tablet  Yes Historical Provider, MD   rOPINIRole (REQUIP) 1 MG tablet Take 1 tablet by mouth 3 times daily Yes Wilkie Bosworth, MD   simvastatin (ZOCOR) 40 MG tablet Take 1 tablet by mouth nightly Yes Luis Huitron APRN - CNP   amLODIPine (NORVASC) 10 MG tablet Take 1 tablet by mouth daily Indications: take DOS Yes Luis Huitron APRN - CNP   omeprazole (PRILOSEC) 20 MG capsule Take 20 mg by mouth daily. Indications: take dos Yes Historical Provider, MD   traZODone (DESYREL) 50 MG tablet Take 2 tablets by mouth nightly  Lurdes Mancilla MD   levothyroxine (SYNTHROID) 112 MCG tablet Take 1 tablet by mouth Daily Indications: Take DOS  Lurdes Mancilla MD     No Known Allergies  Social History     Tobacco Use    Smoking status: Former Smoker     Packs/day: 1.00     Years: 10.00     Pack years: 10.00     Types: Cigarettes     Quit date: 1970     Years since quittin.3    Smokeless tobacco: Never Used    Tobacco comment: Quit 40 years ago   Substance Use Topics    Alcohol use: Yes     Alcohol/week: 1.0 standard drink     Types: 1 Cans of beer per week     Comment: 1 beer daily       ROS : A 10-12 system review of constitutional, cardiovascular, respiratory, musculoskeletal, endocrine, skin, SHEENT, genitourinary, psychiatric and neurologic systems was obtained and updated today and is unremarkable except as mentioned in my HPI        Exam:   Constitutional:   Vitals:    22 1251 22 1308   BP: (!) 145/86 (!) 143/77   Site: Right Wrist Right Wrist   Position: Sitting Sitting   Pulse: 81 79   SpO2: 96%    Height: 5' 3\" (1.6 m)      General appearance:  Normal development and appear in no acute distress. Mental Status: the same  AAO x2 today, no change  Poor immediate recall  Poor insight  Language is fluent but soft  Good attention today. Cranial Nerves:   II:  Pupils: equal, round, reactive to light  III,IV,VI: Extra Ocular Movements are intact. No nystagmus  VII: Facial strength and movements: intact and symmetric  XI: Shoulder shrug is intact  XII: Tongue movements are normal  Musculoskeletal: The same generalized diffuse weakness 4/5 with poor effort.   No tremors today  Decreased range of motion in legs with mild to moderate cogwheeling rigidity. No resting tremors. Poor REM. Poor postural reflexes and truncal ataxia. Gait unable to stand without assistant with poor postural reflexes. No change compared to last exam.    Medical decision making: moderate    I personally reviewed and updated social history, past medical history, medications, allergy, surgical history, and family history as documented in the patient's electronic health records. Labs other test results reviewed and discussed with the patient and her . Recent TSH was 0.02 and free T4 1.9. Obtained history from her  as well. Reviewed notes from other physicians. Provided patient education regarding risk, benefits and treatment options as well as adherence to medication regimen and side effect from these medications. No change      Assessment:   Diagnosis Orders   1. PD ([facility]'s disease) [facility])  Ambulatory referral to Physical Therapy   2. Dementia due to Parkinson's disease without behavioral disturbance ([facility] [address]     3. Restless legs syndrome (RLS)     4. Dyslipidemia       Parkinsonism with underlying cognitive impairment, not controlled  Dementia due to Parkinson disease  Restless leg syndrome        Plan:  Continue with Sinemet   mg 2 tablets, 4 times daily  PT and OT referral for gait training  Lengthy discussion with the patient and her  regarding gait training, risk of falling and injury and side effect from medications  Continue Requip 1 mg 3 times daily  Discussed risk of sudden death with antipsychotic medications  Continue SSRI  Statin  Trazodone  Continue current blood pressure medications  I gave the patient and the  option of second opinion with  [facility] if they would like to have more changes in her medications. They both elected not to have second opinion at this point as they would like to keep with such regimen.   Aspiration precautions  Follow-up with me for 6 months or sooner if new symptoms arise

## 2022-05-09 RX ORDER — AMLODIPINE BESYLATE 10 MG/1
TABLET ORAL
Qty: 90 TABLET | Refills: 3 | Status: SHIPPED | OUTPATIENT
Start: 2022-05-09

## 2022-05-16 DIAGNOSIS — G20 PD (PARKINSON'S DISEASE) (HCC): ICD-10-CM

## 2022-05-16 DIAGNOSIS — G25.81 RESTLESS LEGS SYNDROME (RLS): ICD-10-CM

## 2022-05-16 RX ORDER — ROPINIROLE 1 MG/1
1 TABLET, FILM COATED ORAL 3 TIMES DAILY
Qty: 270 TABLET | Refills: 1 | Status: SHIPPED | OUTPATIENT
Start: 2022-05-16

## 2022-05-23 ENCOUNTER — OFFICE VISIT (OUTPATIENT)
Dept: FAMILY MEDICINE CLINIC | Age: 77
End: 2022-05-23
Payer: MEDICARE

## 2022-05-23 VITALS
DIASTOLIC BLOOD PRESSURE: 74 MMHG | HEART RATE: 84 BPM | HEIGHT: 63 IN | OXYGEN SATURATION: 97 % | BODY MASS INDEX: 25.91 KG/M2 | WEIGHT: 146.2 LBS | SYSTOLIC BLOOD PRESSURE: 128 MMHG

## 2022-05-23 DIAGNOSIS — N39.0 URINARY TRACT INFECTION WITH HEMATURIA, SITE UNSPECIFIED: ICD-10-CM

## 2022-05-23 DIAGNOSIS — F32.0 MILD MAJOR DEPRESSION, SINGLE EPISODE (HCC): ICD-10-CM

## 2022-05-23 DIAGNOSIS — G20 DEMENTIA DUE TO PARKINSON'S DISEASE WITHOUT BEHAVIORAL DISTURBANCE (HCC): ICD-10-CM

## 2022-05-23 DIAGNOSIS — J44.9 COPD, MODERATE (HCC): ICD-10-CM

## 2022-05-23 DIAGNOSIS — G20 PD (PARKINSON'S DISEASE) (HCC): ICD-10-CM

## 2022-05-23 DIAGNOSIS — R31.9 URINARY TRACT INFECTION WITH HEMATURIA, SITE UNSPECIFIED: ICD-10-CM

## 2022-05-23 DIAGNOSIS — F02.80 DEMENTIA DUE TO PARKINSON'S DISEASE WITHOUT BEHAVIORAL DISTURBANCE (HCC): ICD-10-CM

## 2022-05-23 DIAGNOSIS — R39.9 UTI SYMPTOMS: Primary | ICD-10-CM

## 2022-05-23 LAB
BILIRUBIN, POC: NEGATIVE
BLOOD URINE, POC: NORMAL
CLARITY, POC: NORMAL
COLOR, POC: NORMAL
GLUCOSE URINE, POC: NEGATIVE
KETONES, POC: NEGATIVE
LEUKOCYTE EST, POC: NORMAL
NITRITE, POC: NEGATIVE
PH, POC: 6
PROTEIN, POC: 100
SPECIFIC GRAVITY, POC: 1.02
UROBILINOGEN, POC: 0.2

## 2022-05-23 PROCEDURE — G8427 DOCREV CUR MEDS BY ELIG CLIN: HCPCS

## 2022-05-23 PROCEDURE — 1090F PRES/ABSN URINE INCON ASSESS: CPT

## 2022-05-23 PROCEDURE — G8417 CALC BMI ABV UP PARAM F/U: HCPCS

## 2022-05-23 PROCEDURE — 81002 URINALYSIS NONAUTO W/O SCOPE: CPT

## 2022-05-23 PROCEDURE — G8400 PT W/DXA NO RESULTS DOC: HCPCS

## 2022-05-23 PROCEDURE — 3023F SPIROM DOC REV: CPT

## 2022-05-23 PROCEDURE — 1036F TOBACCO NON-USER: CPT

## 2022-05-23 PROCEDURE — 1123F ACP DISCUSS/DSCN MKR DOCD: CPT

## 2022-05-23 PROCEDURE — 99214 OFFICE O/P EST MOD 30 MIN: CPT

## 2022-05-23 RX ORDER — TRAZODONE HYDROCHLORIDE 50 MG/1
100 TABLET ORAL NIGHTLY
Qty: 60 TABLET | Refills: 1 | Status: SHIPPED | OUTPATIENT
Start: 2022-05-23 | End: 2022-07-20

## 2022-05-23 RX ORDER — CIPROFLOXACIN 250 MG/1
250 TABLET, FILM COATED ORAL 2 TIMES DAILY
Qty: 14 TABLET | Refills: 0 | Status: SHIPPED | OUTPATIENT
Start: 2022-05-23 | End: 2022-05-24 | Stop reason: SDUPTHER

## 2022-05-23 SDOH — ECONOMIC STABILITY: FOOD INSECURITY: WITHIN THE PAST 12 MONTHS, THE FOOD YOU BOUGHT JUST DIDN'T LAST AND YOU DIDN'T HAVE MONEY TO GET MORE.: NEVER TRUE

## 2022-05-23 SDOH — ECONOMIC STABILITY: FOOD INSECURITY: WITHIN THE PAST 12 MONTHS, YOU WORRIED THAT YOUR FOOD WOULD RUN OUT BEFORE YOU GOT MONEY TO BUY MORE.: NEVER TRUE

## 2022-05-23 ASSESSMENT — PATIENT HEALTH QUESTIONNAIRE - PHQ9
6. FEELING BAD ABOUT YOURSELF - OR THAT YOU ARE A FAILURE OR HAVE LET YOURSELF OR YOUR FAMILY DOWN: 0
5. POOR APPETITE OR OVEREATING: 0
4. FEELING TIRED OR HAVING LITTLE ENERGY: 0
10. IF YOU CHECKED OFF ANY PROBLEMS, HOW DIFFICULT HAVE THESE PROBLEMS MADE IT FOR YOU TO DO YOUR WORK, TAKE CARE OF THINGS AT HOME, OR GET ALONG WITH OTHER PEOPLE: 0
SUM OF ALL RESPONSES TO PHQ QUESTIONS 1-9: 0
SUM OF ALL RESPONSES TO PHQ QUESTIONS 1-9: 0
3. TROUBLE FALLING OR STAYING ASLEEP: 0
1. LITTLE INTEREST OR PLEASURE IN DOING THINGS: 0
2. FEELING DOWN, DEPRESSED OR HOPELESS: 0
SUM OF ALL RESPONSES TO PHQ QUESTIONS 1-9: 0
7. TROUBLE CONCENTRATING ON THINGS, SUCH AS READING THE NEWSPAPER OR WATCHING TELEVISION: 0
SUM OF ALL RESPONSES TO PHQ QUESTIONS 1-9: 0
9. THOUGHTS THAT YOU WOULD BE BETTER OFF DEAD, OR OF HURTING YOURSELF: 0
8. MOVING OR SPEAKING SO SLOWLY THAT OTHER PEOPLE COULD HAVE NOTICED. OR THE OPPOSITE, BEING SO FIGETY OR RESTLESS THAT YOU HAVE BEEN MOVING AROUND A LOT MORE THAN USUAL: 0
SUM OF ALL RESPONSES TO PHQ9 QUESTIONS 1 & 2: 0

## 2022-05-23 ASSESSMENT — ENCOUNTER SYMPTOMS
SHORTNESS OF BREATH: 0
BLOOD IN STOOL: 0
CONSTIPATION: 0
CHEST TIGHTNESS: 0
DIARRHEA: 0

## 2022-05-23 ASSESSMENT — SOCIAL DETERMINANTS OF HEALTH (SDOH): HOW HARD IS IT FOR YOU TO PAY FOR THE VERY BASICS LIKE FOOD, HOUSING, MEDICAL CARE, AND HEATING?: NOT HARD AT ALL

## 2022-05-23 NOTE — TELEPHONE ENCOUNTER
CNP   amLODIPine (NORVASC) 10 MG tablet Take 1 tablet by mouth daily Indications: take DOS Yes Nicho Weller APRN - CNP   omeprazole (PRILOSEC) 20 MG capsule Take 20 mg by mouth daily. Indications: take dos Yes Historical Provider, MD   traZODone (DESYREL) 50 MG tablet Take 2 tablets by mouth nightly   Ashia Boothe MD   levothyroxine (SYNTHROID) 112 MCG tablet Take 1 tablet by mouth Daily Indications: Take DOS   Ashia Boothe MD      No Known Allergies  Social History            Tobacco Use    Smoking status: Former Smoker       Packs/day: 1.00       Years: 10.00       Pack years: 10.00       Types: Cigarettes       Quit date: 1970       Years since quittin.3    Smokeless tobacco: Never Used    Tobacco comment: Quit 40 years ago   Substance Use Topics    Alcohol use: Yes       Alcohol/week: 1.0 standard drink       Types: 1 Cans of beer per week       Comment: 1 beer daily         ROS : A 10-12 system review of constitutional, cardiovascular, respiratory, musculoskeletal, endocrine, skin, SHEENT, genitourinary, psychiatric and neurologic systems was obtained and updated today and is unremarkable except as mentioned in my HPI           Exam:   Constitutional:   Vitals        Vitals:     22 1251 22 1308   BP: (!) 145/86 (!) 143/77   Site: Right Wrist Right Wrist   Position: Sitting Sitting   Pulse: 81 79   SpO2: 96%     Height: 5' 3\" (1.6 m)           General appearance:  Normal development and appear in no acute distress. Mental Status: the same  AAO x2 today, no change  Poor immediate recall  Poor insight  Language is fluent but soft  Good attention today. Cranial Nerves:   II:  Pupils: equal, round, reactive to light  III,IV,VI: Extra Ocular Movements are intact. No nystagmus  VII: Facial strength and movements: intact and symmetric  XI: Shoulder shrug is intact  XII: Tongue movements are normal  Musculoskeletal: The same generalized diffuse weakness 4/5 with poor effort.   No tremors today  Decreased range of motion in legs with mild to moderate cogwheeling rigidity. No resting tremors. Poor REM. Poor postural reflexes and truncal ataxia. Gait unable to stand without assistant with poor postural reflexes. No change compared to last exam.     Medical decision making: moderate     I personally reviewed and updated social history, past medical history, medications, allergy, surgical history, and family history as documented in the patient's electronic health records.      Labs other test results reviewed and discussed with the patient and her . Recent TSH was 0.02 and free T4 1.9. Obtained history from her  as well. Reviewed notes from other physicians. Provided patient education regarding risk, benefits and treatment options as well as adherence to medication regimen and side effect from these medications. No change        Assessment:    Diagnosis Orders   1. PD (Parkinson's disease) [facility])  Ambulatory referral to Physical Therapy   2. Dementia due to Parkinson's disease without behavioral disturbance (HCC)      3. Restless legs syndrome (RLS)      4. Dyslipidemia         Parkinsonism with underlying cognitive impairment, not controlled  Dementia due to Parkinson disease  Restless leg syndrome           Plan:  Continue with Sinemet   mg 2 tablets, 4 times daily  PT and OT referral for gait training  Lengthy discussion with the patient and her  regarding gait training, risk of falling and injury and side effect from medications  Continue Requip 1 mg 3 times daily  Discussed risk of sudden death with antipsychotic medications  Continue SSRI  Statin  Trazodone  Continue current blood pressure medications  I gave the patient and the  option of second opinion with  [facility] clinic if they would like to have more changes in her medications.   They both elected not to have second opinion at this point as they would like to keep with such regimen. Aspiration precautions  Follow-up with me for 6 months or sooner if new symptoms arise                            Instructions       Return in about 6 months (around 11/6/2022).

## 2022-05-23 NOTE — TELEPHONE ENCOUNTER
Pt called requesting refill on her carbidopa-levodopa, asked for it to be sent to [facility]. LOV: 5/6/22  NOV: 11/8/22    Please advise. Thank you.

## 2022-05-23 NOTE — PROGRESS NOTES
Chief Complaint   Patient presents with    Dysuria       HPI:  Africa Tovar is a 64 y.o. (: 1945) here today   for   Dysuria   This is a new problem. The current episode started 1 to 4 weeks ago. The problem occurs every urination. The problem has been gradually worsening. There has been no fever. Associated symptoms include urgency. Pertinent negatives include no chills, frequency or hematuria. She has tried nothing for the symptoms. The treatment provided no relief. Her past medical history is significant for recurrent UTIs. HCC: below    Copd: feels is well managed at this time. Does not follow pulm and is not on any meds at this time. Does not need any intervention at this time. Depression: feels is well controlled with current med reg. Taking Cymbalta, abilify, trazodone. Does not need any additional intervention at this time. Patient's medications, allergies, past medical, surgical, social and family histories were reviewed and updated as appropriate. ROS:  Review of Systems   Constitutional: Negative for chills, fatigue and fever. Respiratory: Negative for chest tightness and shortness of breath. Cardiovascular: Negative for chest pain and palpitations. Gastrointestinal: Negative for blood in stool, constipation and diarrhea. Genitourinary: Positive for dysuria and urgency. Negative for frequency and hematuria. Musculoskeletal: Positive for arthralgias and gait problem. Neurological: Negative for dizziness, light-headedness and headaches. Psychiatric/Behavioral: Negative for dysphoric mood and sleep disturbance. The patient is not nervous/anxious. Prior to Visit Medications    Medication Sig Taking?  Authorizing Provider   traZODone (DESYREL) 50 MG tablet Take 2 tablets by mouth nightly Yes Armin Castro MD   ciprofloxacin (CIPRO) 250 MG tablet Take 1 tablet by mouth 2 times daily for 7 days Yes AKBAR Garcia - CNP   rOPINIRole (REQUIP) 1 MG tablet Take 1 tablet by mouth 3 times daily Yes AKBAR Joseph CNP   amLODIPine (NORVASC) 10 MG tablet TAKE ONE TABLET BY MOUTH EVERY DAY Yes Jonny Kimball MD   Misc. Devices (ROLLATOR ULTRA-LIGHT) MISC 1 each by Does not apply route daily Yes Jonny Kimball MD   celecoxib (CELEBREX) 200 MG capsule Take 1 capsule by mouth 2 times daily Yes Jonny Kimball MD   carbidopa-levodopa (SINEMET)  MG per tablet Take 1 tablet by mouth 8 times daily  Patient taking differently: Take 2 tablets by mouth 4 times daily  Yes Lacy Daugherty MD   DULoxetine (CYMBALTA) 60 MG extended release capsule  Yes Historical Provider, MD   methylphenidate (RITALIN) 10 MG tablet  Yes Historical Provider, MD   simvastatin (ZOCOR) 40 MG tablet Take 1 tablet by mouth nightly Yes AKBAR Pulido CNP   levothyroxine (SYNTHROID) 112 MCG tablet Take 1 tablet by mouth Daily Indications: Take DOS Yes Jonny Kimball MD   omeprazole (PRILOSEC) 20 MG capsule Take 20 mg by mouth daily. Indications: take dos Yes Historical Provider, MD   ARIPiprazole (ABILIFY) 5 MG tablet   Historical Provider, MD       No Known Allergies    OBJECTIVE:    /74   Pulse 84   Ht 5' 3\" (1.6 m)   Wt 146 lb 3.2 oz (66.3 kg)   SpO2 97%   BMI 25.90 kg/m²     BP Readings from Last 2 Encounters:   05/23/22 128/74   05/06/22 (!) 143/77       Wt Readings from Last 3 Encounters:   05/23/22 146 lb 3.2 oz (66.3 kg)   12/07/21 128 lb (58.1 kg)   07/19/21 133 lb 3.2 oz (60.4 kg)        Physical Exam  Constitutional:       Appearance: Normal appearance. HENT:      Head: Normocephalic and atraumatic. Cardiovascular:      Rate and Rhythm: Normal rate and regular rhythm. Pulses: Normal pulses. Heart sounds: Normal heart sounds. No murmur heard. Pulmonary:      Effort: Pulmonary effort is normal. No respiratory distress. Breath sounds: Normal breath sounds. No wheezing.    Abdominal:      General: Bowel sounds are normal. Palpations: Abdomen is soft. Tenderness: There is no abdominal tenderness. Musculoskeletal:      Comments: Uses walker and posey belt   Skin:     General: Skin is warm and dry. Neurological:      General: No focal deficit present. Mental Status: She is alert and oriented to person, place, and time. Motor: Weakness present. Gait: Gait abnormal.   Psychiatric:         Mood and Affect: Mood normal.         Behavior: Behavior normal.           ASSESSMENT/PLAN:    1. UTI symptoms  See above HPI for patient symptoms and onset. Urinalysis in office today indicative of UTI. We will treat the patient with Cipro, see below. Urine culture sent off for testing. Discussed with patient there is a possibility we may need to change antibiotics depending urine culture result. Encouraged patient to increase water consumption to flush kidneys and bladder. May take Azo OTC for urinary burning. Follow-up with office if symptoms fail to improve or worsen. - POCT Urinalysis no Micro  - Culture, Urine  - ciprofloxacin (CIPRO) 250 MG tablet; Take 1 tablet by mouth 2 times daily for 7 days  Dispense: 14 tablet; Refill: 0    2. Urinary tract infection with hematuria, site unspecified  See above #1  - ciprofloxacin (CIPRO) 250 MG tablet; Take 1 tablet by mouth 2 times daily for 7 days  Dispense: 14 tablet; Refill: 0    3. COPD, moderate ([address]  Patient states her COPD is stable at this time. Does not need any intervention at this time. 4. Mild major depression, single episode ([address]  Controlled on current medication regimen at this time. Patient states she does not need any additional intervention at this time. 30 minutes spent on patient care today    This document was prepared by a combination of typing and transcription through a voice recognition software.     Marlene Dos Santos, APRN - CNP

## 2022-05-24 ENCOUNTER — TELEPHONE (OUTPATIENT)
Dept: FAMILY MEDICINE CLINIC | Age: 77
End: 2022-05-24

## 2022-05-24 DIAGNOSIS — R31.9 URINARY TRACT INFECTION WITH HEMATURIA, SITE UNSPECIFIED: ICD-10-CM

## 2022-05-24 DIAGNOSIS — R39.9 UTI SYMPTOMS: ICD-10-CM

## 2022-05-24 DIAGNOSIS — N39.0 URINARY TRACT INFECTION WITH HEMATURIA, SITE UNSPECIFIED: ICD-10-CM

## 2022-05-24 RX ORDER — CIPROFLOXACIN 250 MG/1
250 TABLET, FILM COATED ORAL 2 TIMES DAILY
Qty: 14 TABLET | Refills: 0 | Status: SHIPPED | OUTPATIENT
Start: 2022-05-24 | End: 2022-05-31

## 2022-05-24 NOTE — TELEPHONE ENCOUNTER
Pt picked up her Cipro from [facility] in [facility] and she's misplaced it. Will need a new RX called in to [place].

## 2022-05-26 LAB
ORGANISM: ABNORMAL
URINE CULTURE, ROUTINE: ABNORMAL

## 2022-07-18 RX ORDER — SIMVASTATIN 40 MG
TABLET ORAL
Qty: 90 TABLET | Refills: 2 | Status: SHIPPED | OUTPATIENT
Start: 2022-07-18

## 2022-07-20 RX ORDER — TRAZODONE HYDROCHLORIDE 50 MG/1
100 TABLET ORAL NIGHTLY
Qty: 60 TABLET | Refills: 1 | Status: SHIPPED | OUTPATIENT
Start: 2022-07-20 | End: 2022-08-19

## 2022-07-20 NOTE — TELEPHONE ENCOUNTER
Future appt scheduled 0 appt scheduled                   Last appt 05/23/2022      Last Written 05/23/2022    traZODone (DESYREL) 50 MG tablet  #60  1 RF

## 2022-09-19 ENCOUNTER — TELEPHONE (OUTPATIENT)
Dept: FAMILY MEDICINE CLINIC | Age: 77
End: 2022-09-19

## 2022-09-19 NOTE — TELEPHONE ENCOUNTER
Spouse was in the office. Pt is inpt at [facility] s/p fall, broke rt hip on 7/30. Surgery performed on 8/1.    SANDRA

## 2022-11-28 ENCOUNTER — TELEPHONE (OUTPATIENT)
Dept: FAMILY MEDICINE CLINIC | Age: 77
End: 2022-11-28

## 2022-11-28 NOTE — TELEPHONE ENCOUNTER
Dipika Ritter from [facility] called and said the pt had a fall on Nov 19 and Nov 22 was not injured in either fall and  was able to get her up

## 2022-11-29 ENCOUNTER — OFFICE VISIT (OUTPATIENT)
Dept: FAMILY MEDICINE CLINIC | Age: 77
End: 2022-11-29
Payer: MEDICARE

## 2022-11-29 VITALS
DIASTOLIC BLOOD PRESSURE: 80 MMHG | SYSTOLIC BLOOD PRESSURE: 136 MMHG | BODY MASS INDEX: 24.24 KG/M2 | HEIGHT: 63 IN | HEART RATE: 96 BPM | OXYGEN SATURATION: 95 % | WEIGHT: 136.8 LBS

## 2022-11-29 DIAGNOSIS — Z86.73 REMOTE HISTORY OF STROKE: ICD-10-CM

## 2022-11-29 DIAGNOSIS — J44.9 COPD, MODERATE (HCC): ICD-10-CM

## 2022-11-29 DIAGNOSIS — R29.6 FREQUENT FALLS: ICD-10-CM

## 2022-11-29 DIAGNOSIS — E07.9 THYROID DISEASE: ICD-10-CM

## 2022-11-29 DIAGNOSIS — N18.30 STAGE 3 CHRONIC KIDNEY DISEASE, UNSPECIFIED WHETHER STAGE 3A OR 3B CKD (HCC): ICD-10-CM

## 2022-11-29 DIAGNOSIS — M62.81 MUSCLE WEAKNESS: ICD-10-CM

## 2022-11-29 DIAGNOSIS — G20 PD (PARKINSON'S DISEASE) (HCC): ICD-10-CM

## 2022-11-29 DIAGNOSIS — E78.5 DYSLIPIDEMIA: ICD-10-CM

## 2022-11-29 DIAGNOSIS — Z87.81 STATUS POST FRACTURE OF RIGHT HIP: Primary | ICD-10-CM

## 2022-11-29 DIAGNOSIS — M25.551 RIGHT HIP PAIN: ICD-10-CM

## 2022-11-29 DIAGNOSIS — I10 HTN (HYPERTENSION), BENIGN: ICD-10-CM

## 2022-11-29 LAB
A/G RATIO: 1.3 (ref 1.1–2.2)
ALBUMIN SERPL-MCNC: 3.7 G/DL (ref 3.4–5)
ALP BLD-CCNC: 144 U/L (ref 40–129)
ALT SERPL-CCNC: <5 U/L (ref 10–40)
ANION GAP SERPL CALCULATED.3IONS-SCNC: 11 MMOL/L (ref 3–16)
AST SERPL-CCNC: 9 U/L (ref 15–37)
BILIRUB SERPL-MCNC: 0.4 MG/DL (ref 0–1)
BUN BLDV-MCNC: 7 MG/DL (ref 7–20)
CALCIUM SERPL-MCNC: 9.3 MG/DL (ref 8.3–10.6)
CHLORIDE BLD-SCNC: 104 MMOL/L (ref 99–110)
CHOLESTEROL, TOTAL: 138 MG/DL (ref 0–199)
CO2: 25 MMOL/L (ref 21–32)
CREAT SERPL-MCNC: 0.9 MG/DL (ref 0.6–1.2)
GFR SERPL CREATININE-BSD FRML MDRD: >60 ML/MIN/{1.73_M2}
GLUCOSE BLD-MCNC: 86 MG/DL (ref 70–99)
HDLC SERPL-MCNC: 63 MG/DL (ref 40–60)
LDL CHOLESTEROL CALCULATED: 59 MG/DL
POTASSIUM SERPL-SCNC: 4.8 MMOL/L (ref 3.5–5.1)
SODIUM BLD-SCNC: 140 MMOL/L (ref 136–145)
T4 FREE: 1.7 NG/DL (ref 0.9–1.8)
TOTAL PROTEIN: 6.6 G/DL (ref 6.4–8.2)
TRIGL SERPL-MCNC: 81 MG/DL (ref 0–150)
TSH SERPL DL<=0.05 MIU/L-ACNC: 0.12 UIU/ML (ref 0.27–4.2)
VLDLC SERPL CALC-MCNC: 16 MG/DL

## 2022-11-29 PROCEDURE — 1123F ACP DISCUSS/DSCN MKR DOCD: CPT | Performed by: FAMILY MEDICINE

## 2022-11-29 PROCEDURE — 99214 OFFICE O/P EST MOD 30 MIN: CPT | Performed by: FAMILY MEDICINE

## 2022-11-29 PROCEDURE — G8427 DOCREV CUR MEDS BY ELIG CLIN: HCPCS | Performed by: FAMILY MEDICINE

## 2022-11-29 PROCEDURE — 3023F SPIROM DOC REV: CPT | Performed by: FAMILY MEDICINE

## 2022-11-29 PROCEDURE — G8400 PT W/DXA NO RESULTS DOC: HCPCS | Performed by: FAMILY MEDICINE

## 2022-11-29 PROCEDURE — G8484 FLU IMMUNIZE NO ADMIN: HCPCS | Performed by: FAMILY MEDICINE

## 2022-11-29 PROCEDURE — 36415 COLL VENOUS BLD VENIPUNCTURE: CPT | Performed by: FAMILY MEDICINE

## 2022-11-29 PROCEDURE — 1090F PRES/ABSN URINE INCON ASSESS: CPT | Performed by: FAMILY MEDICINE

## 2022-11-29 PROCEDURE — 3078F DIAST BP <80 MM HG: CPT | Performed by: FAMILY MEDICINE

## 2022-11-29 PROCEDURE — G8420 CALC BMI NORM PARAMETERS: HCPCS | Performed by: FAMILY MEDICINE

## 2022-11-29 PROCEDURE — 3074F SYST BP LT 130 MM HG: CPT | Performed by: FAMILY MEDICINE

## 2022-11-29 PROCEDURE — 1036F TOBACCO NON-USER: CPT | Performed by: FAMILY MEDICINE

## 2022-11-29 ASSESSMENT — ENCOUNTER SYMPTOMS
SHORTNESS OF BREATH: 0
CONSTIPATION: 0
DIARRHEA: 0

## 2022-11-29 NOTE — PROGRESS NOTES
Chief Complaint   Patient presents with    Follow-up     Patient was discharged from [facility] on 22 for rehab after hip fracture surgery. HPI:  Sahra Merino is a 76 y.o. (: 1945) here today   for follow up after being discharged from [facility] on 22. HPI  Hip fracture on . Was transferred to [facility]. Was admitted - after right IM nail for displaced intertrochanteric fracture of right femur. Admitted to [facility] at d/c. Has been home from snf since . Has had 2 falls since home. Lost balance during transfers. Parkinson's sxs near baseline. O/w near baseline other than hip issues. Has 7 stairs that have to be negotiated at home. Still w/ sig issues going up and down steps. Still pain to right hip as below. Does not have ortho f/u currently. Still pain in right hip w/ walking. No changes to bp meds noted. Bp has been well controlled. Patient's medications, allergies, past medical, surgical, social and family histories were reviewed and updated as appropriate. ROS:  Review of Systems   Constitutional:  Negative for fever. Respiratory:  Negative for shortness of breath. Gastrointestinal:  Negative for constipation and diarrhea. Musculoskeletal:  Positive for arthralgias and gait problem.          LDL Calculated (mg/dL)   Date Value   2021 38       Past Medical History:   Diagnosis Date    Blood transfusion reaction     Cancer [facility])     breast cancer    History of blood transfusion     Hypertension     Thyroid disease     Unspecified cerebral artery occlusion with cerebral infarction        Family History   Problem Relation Age of Onset    Cancer Mother         ovarian and colon    Cancer Father         lung    COPD Brother        Social History     Socioeconomic History    Marital status:      Spouse name: Not on file    Number of children: Not on file    Years of education: Not on file    Highest education level: Not on file   Occupational History    Not on file   Tobacco Use    Smoking status: Former     Packs/day: 1.00     Years: 10.00     Pack years: 10.00     Types: Cigarettes     Quit date: 1970     Years since quittin.9    Smokeless tobacco: Never    Tobacco comments:     Quit 40 years ago   Substance and Sexual Activity    Alcohol use: Yes     Alcohol/week: 1.0 standard drink     Types: 1 Cans of beer per week     Comment: 1 beer daily    Drug use: No    Sexual activity: Not on file   Other Topics Concern    Not on file   Social History Narrative    Not on file     Social Determinants of Health     Financial Resource Strain: Low Risk     Difficulty of Paying Living Expenses: Not hard at all   Food Insecurity: No Food Insecurity    Worried About Running Out of Food in the Last Year: Never true    Ran Out of Food in the Last Year: Never true   Transportation Needs: Not on file   Physical Activity: Not on file   Stress: Not on file   Social Connections: Not on file   Intimate Partner Violence: Not on file   Housing Stability: Not on file       Prior to Visit Medications    Medication Sig Taking?  Authorizing Provider   traZODone (DESYREL) 50 MG tablet TAKE 2 TABLETS BY MOUTH NIGHTLY Yes , APRN - CNP   simvastatin (ZOCOR) 40 MG tablet TAKE ONE TABLET BY MOUTH NIGHTLY Yes , APRN - CNP   carbidopa-levodopa (SINEMET)  MG per tablet Take 2 tablets by mouth 4 times daily Yes AKBAR Velasquez CNP   rOPINIRole (REQUIP) 1 MG tablet Take 1 tablet by mouth 3 times daily Yes AKBAR Velasquez CNP   amLODIPine (NORVASC) 10 MG tablet TAKE ONE TABLET BY MOUTH EVERY DAY Yes Author MD Rosa   celecoxib (CELEBREX) 200 MG capsule Take 1 capsule by mouth 2 times daily Yes Author MD Rosa   ARIPiprazole (ABILIFY) 5 MG tablet  Yes Historical Provider, MD   DULoxetine (CYMBALTA) 60 MG extended release capsule  Yes Historical Provider, MD   methylphenidate (RITALIN) 10 MG tablet  Yes Historical Provider, MD   levothyroxine (SYNTHROID) 112 MCG tablet Take 1 tablet by mouth Daily Indications: Take DOS Yes Giovanni Baumgarten, MD   omeprazole (PRILOSEC) 20 MG capsule Take 20 mg by mouth daily. Indications: take dos Yes Historical Provider, MD   Misc. Devices (ROLLATOR ULTRA-LIGHT) MISC 1 each by Does not apply route daily  Giovanni Baumgarten, MD       No Known Allergies    OBJECTIVE:    /80   Pulse 96   Ht 5' 3\" (1.6 m)   Wt 136 lb 12.8 oz (62.1 kg)   SpO2 95%   BMI 24.23 kg/m²     BP Readings from Last 2 Encounters:   11/29/22 136/80   05/23/22 128/74       Wt Readings from Last 3 Encounters:   11/29/22 136 lb 12.8 oz (62.1 kg)   05/23/22 146 lb 3.2 oz (66.3 kg)   12/07/21 128 lb (58.1 kg)       Physical Exam  HENT:      Head: Normocephalic and atraumatic. Cardiovascular:      Rate and Rhythm: Normal rate and regular rhythm. Pulmonary:      Effort: Pulmonary effort is normal.      Breath sounds: Normal breath sounds. Skin:     General: Skin is warm and dry. Neurological:      General: No focal deficit present. Mental Status: She is alert. Mental status is at baseline. Psychiatric:         Mood and Affect: Affect is flat. Kyphotic. Patient in wheelchair. Flat affect. Head is tilted to the right    ASSESSMENT/PLAN:    1. Status post fracture of right hip  History of fall and displaced intertrochanteric fracture to the right femur. Status post nail. Still with some pain. Ongoing issues with ambulation. Has had a couple of falls recently as well. Would benefit from home care with home physical therapy to continue to help with mobility, to minimize fall risk, and help with other issues. Orders placed for physical therapy. If symptoms fail to improve as far as her ability to bear weight and her pain, could consider referral back to orthopedics. They prefer not to go back to  due to the drive. Could consider someone closer to home    2.  Right hip pain  See above    3. PD (Parkinson's disease) ([facility] [address]  Complicates balance issues. Does increase fall risk. Follow-up with neurology as scheduled. Tolerating current medication    4. Frequent falls  See above    5. Thyroid disease  Due for repeat labs. Repeat labs today     - TSH  - T4, Free    6. HTN (hypertension), benign  The current medical regimen is effective;  continue present plan and medications. - Comprehensive Metabolic Panel    7. Dyslipidemia  Repeat labs today  - Lipid Panel    8. COPD, moderate ([facility] [address]  Near baseline. No new issues    9. Stage 3 chronic kidney disease, unspecified whether stage 3a or 3b CKD ([facility] [address]  Near baseline. Monitor    10. Muscle weakness  Ongoing issues with muscle weakness and balance as listed above. Would benefit from home care and home physical therapy    11. Remote history of stroke  No new issues that would suggest cerebrovascular etiology. Continue to control blood pressure          This document was prepared by a combination of typing and transcription through a voice recognition software.

## 2022-11-30 NOTE — RESULT ENCOUNTER NOTE
Tsh suppressed, but improved overall. FT4 normal.  Cont current thyroid dose. Lipids well controlled.   Mild inc in alk phos, o/w cmp normal. No changes at this time

## 2022-12-19 RX ORDER — LEVOTHYROXINE SODIUM 112 UG/1
112 TABLET ORAL DAILY
Qty: 90 TABLET | Refills: 3 | Status: SHIPPED | OUTPATIENT
Start: 2022-12-19 | End: 2023-01-18

## 2022-12-19 RX ORDER — CELECOXIB 200 MG/1
200 CAPSULE ORAL 2 TIMES DAILY
Qty: 180 CAPSULE | Refills: 1 | Status: SHIPPED | OUTPATIENT
Start: 2022-12-19

## 2023-01-05 ENCOUNTER — OFFICE VISIT (OUTPATIENT)
Dept: NEUROLOGY | Age: 78
End: 2023-01-05
Payer: MEDICARE

## 2023-01-05 VITALS
HEART RATE: 85 BPM | WEIGHT: 136 LBS | BODY MASS INDEX: 24.1 KG/M2 | OXYGEN SATURATION: 98 % | HEIGHT: 63 IN | SYSTOLIC BLOOD PRESSURE: 117 MMHG | DIASTOLIC BLOOD PRESSURE: 77 MMHG

## 2023-01-05 DIAGNOSIS — G20 DEMENTIA DUE TO PARKINSON'S DISEASE WITHOUT BEHAVIORAL DISTURBANCE (HCC): ICD-10-CM

## 2023-01-05 DIAGNOSIS — F02.80 DEMENTIA DUE TO PARKINSON'S DISEASE WITHOUT BEHAVIORAL DISTURBANCE (HCC): ICD-10-CM

## 2023-01-05 DIAGNOSIS — G20 PD (PARKINSON'S DISEASE) (HCC): Primary | ICD-10-CM

## 2023-01-05 DIAGNOSIS — I10 HTN (HYPERTENSION), BENIGN: ICD-10-CM

## 2023-01-05 PROCEDURE — 99214 OFFICE O/P EST MOD 30 MIN: CPT | Performed by: PSYCHIATRY & NEUROLOGY

## 2023-01-05 PROCEDURE — 1036F TOBACCO NON-USER: CPT | Performed by: PSYCHIATRY & NEUROLOGY

## 2023-01-05 PROCEDURE — G8484 FLU IMMUNIZE NO ADMIN: HCPCS | Performed by: PSYCHIATRY & NEUROLOGY

## 2023-01-05 PROCEDURE — G8400 PT W/DXA NO RESULTS DOC: HCPCS | Performed by: PSYCHIATRY & NEUROLOGY

## 2023-01-05 PROCEDURE — 3074F SYST BP LT 130 MM HG: CPT | Performed by: PSYCHIATRY & NEUROLOGY

## 2023-01-05 PROCEDURE — G8427 DOCREV CUR MEDS BY ELIG CLIN: HCPCS | Performed by: PSYCHIATRY & NEUROLOGY

## 2023-01-05 PROCEDURE — 3078F DIAST BP <80 MM HG: CPT | Performed by: PSYCHIATRY & NEUROLOGY

## 2023-01-05 PROCEDURE — G8420 CALC BMI NORM PARAMETERS: HCPCS | Performed by: PSYCHIATRY & NEUROLOGY

## 2023-01-05 PROCEDURE — 1123F ACP DISCUSS/DSCN MKR DOCD: CPT | Performed by: PSYCHIATRY & NEUROLOGY

## 2023-01-05 PROCEDURE — 1090F PRES/ABSN URINE INCON ASSESS: CPT | Performed by: PSYCHIATRY & NEUROLOGY

## 2023-01-05 NOTE — PROGRESS NOTES
The patient came today for follow-up regarding parkinsonism and dementia    The patient came today with her     Since her last visit, she continues have difficulties with ADL and needs prompting from her  daily. She is slow to walk but no recent falling. She walks with her walker. She is not very active throughout the day. She takes Sinemet 25/100 mg tablet, 2 tablets every 6 hours. No side effect. She is on Requip 1 mg 3 times daily. Has been denies any insomnia or parasomnia. No psychosis. The same intermittent short-term memory loss and daily. Degree is moderate. No severe depression. No weight loss. No recent fever or chills. She had PT and OT recently which did help her walking but she is not consistent. Other review of system was unremarkable      Past Medical History:   Diagnosis Date    Blood transfusion reaction 2003    Cancer [facility])     breast cancer    History of blood transfusion     Hypertension     Thyroid disease     Unspecified cerebral artery occlusion with cerebral infarction      Prior to Visit Medications    Medication Sig Taking? Authorizing Provider   celecoxib (CELEBREX) 200 MG capsule Take 1 capsule by mouth 2 times daily Yes West Hernandez MD   levothyroxine (SYNTHROID) 112 MCG tablet Take 1 tablet by mouth Daily Indications: Take DOS Yes West Hernandez MD   traZODone (DESYREL) 50 MG tablet TAKE 2 TABLETS BY MOUTH NIGHTLY Yes AKBAR Marroquin CNP   simvastatin (ZOCOR) 40 MG tablet TAKE ONE TABLET BY MOUTH NIGHTLY Yes AKBAR Marroquin CNP   carbidopa-levodopa (SINEMET)  MG per tablet Take 2 tablets by mouth 4 times daily Yes Daryle Denver, APRN - CNP   rOPINIRole (REQUIP) 1 MG tablet Take 1 tablet by mouth 3 times daily Yes Daryle Denver, APRN - CNP   amLODIPine (NORVASC) 10 MG tablet TAKE ONE TABLET BY MOUTH Malgorzata Chapa Yes West Hernandez MD   Misc.  Devices (ROLLATOR ULTRA-LIGHT) MISC 1 each by Does not apply route daily Yes Lilibeth Barksdale Manual MD Agueda   ARIPiprazole (ABILIFY) 5 MG tablet  Yes Historical Provider, MD   DULoxetine (CYMBALTA) 60 MG extended release capsule  Yes Historical Provider, MD   methylphenidate (RITALIN) 10 MG tablet  Yes Historical Provider, MD   omeprazole (PRILOSEC) 20 MG capsule Take 20 mg by mouth daily. Indications: take dos Yes Historical Provider, MD     No Known Allergies  Social History     Tobacco Use    Smoking status: Former     Packs/day: 1.00     Years: 10.00     Pack years: 10.00     Types: Cigarettes     Quit date: 1970     Years since quittin.0    Smokeless tobacco: Never    Tobacco comments:     Quit 40 years ago   Substance Use Topics    Alcohol use: Yes     Alcohol/week: 1.0 standard drink     Types: 1 Cans of beer per week     Comment: 1 beer daily       ROS : A 10-12 system review of constitutional, cardiovascular, respiratory, musculoskeletal, endocrine, skin, SHEENT, genitourinary, psychiatric and neurologic systems was obtained and updated today and is unremarkable except as mentioned in my HPI        Exam:   Constitutional:   Vitals:    23 1044   BP: 117/77   Site: Left Wrist   Position: Sitting   Pulse: 85   SpO2: 98%   Weight: 136 lb (61.7 kg)   Height: 5' 3\" (1.6 m)     General appearance:   appear in no acute distress. ,  Sitting in wheelchair, flexed posture  Mental Status:   AAO x2  Poor immediate recall  Poor fund of knowledge  Soft speech    Cranial Nerves:   II:  Pupils: equal, round, reactive to light  III,IV,VI: Extra Ocular Movements are intact. No nystagmus  VII: Facial strength and movements: intact and symmetric  XI: Shoulder shrug is intact  XII: Tongue movements are normal  Musculoskeletal: The same generalized diffuse weakness 4/5 with poor effort. No tremors today  Decreased range of motion in legs with mild to moderate cogwheeling rigidity. No resting tremors. Poor REM. Poor postural reflexes and truncal ataxia.     Gait unable to stand without assistant with poor postural reflexes. Looks worse compared to last test.    Medical decision making: moderate    I personally reviewed and updated social history, past medical history, medications, allergy, surgical history, and family history as documented in the patient's electronic health records. Obtained history from her   Reviewed notes from different physicians  Reviewed recent blood testing from July and November of last year. Unremarkable CBC and CMP except for alkaline phosphatase 144 and TSH 0.12. White count 11. Assessment:   Diagnosis Orders   1. PD [facility] ([facility]        2. Dementia due to Parkinson's disease without behavioral disturbance ([facility]        3.  HTN (hypertension), benign            Chronic cognitive impairment with Parkinson disease seems to be worse compared to last exam  Hypertension  Dementia          Plan:    Increase Sinemet up to 2 tablets, 5 times daily  Refill for medication  Discussed side effect  Continue Requip the same dose 1 mg 3 times daily  Continue with PT and OT  Discussed risk of falling at home with her  and need to improve her postural  Blood pressure monitor and control  Hydration  Improving sleep hygiene  She is on Abilify and we discussed side effect and benefit and blackbox warning  Statin  Aspirin for stroke prevention  Follow-up 6-month

## 2023-01-16 DIAGNOSIS — G20 DEMENTIA DUE TO PARKINSON'S DISEASE WITHOUT BEHAVIORAL DISTURBANCE (HCC): ICD-10-CM

## 2023-01-16 DIAGNOSIS — F02.80 DEMENTIA DUE TO PARKINSON'S DISEASE WITHOUT BEHAVIORAL DISTURBANCE (HCC): ICD-10-CM

## 2023-01-16 DIAGNOSIS — G20 PD (PARKINSON'S DISEASE) (HCC): ICD-10-CM

## 2023-01-30 LAB
AMORPHOUS: NORMAL
BACTERIA: NORMAL
BILIRUBIN URINE: NEGATIVE
CASTS: NEGATIVE
CLARITY: NORMAL
COLOR: YELLOW
CRYSTALS, UA: NEGATIVE
EPITHELIAL CELLS: NORMAL
ERYTHROCYTES URINE: NORMAL
ERYTHROCYTES URINE: NORMAL
GLUCOSE URINE: NEGATIVE
KETONES, URINE: NORMAL
LEUKOCYTE ESTERASE, URINE: NORMAL
LEUKOCYTES, UA: NORMAL
MUCUS, URINE: NORMAL
NITRITE SER/PLAS SCNC PT QN: POSITIVE
PH UA: 5.5
PROTEIN FLUID: 30 MG/DL
SPECIFIC GRAVITY UA: >=1.03 (ref 1–1.03)
TRICHOMONAS: NEGATIVE
UROBILINOGEN, URINE: 0.2 MG/DL (ref 0.2–1)
YEAST, URINE: NEGATIVE

## 2023-01-31 ENCOUNTER — ANESTHESIA (OUTPATIENT)
Dept: OPERATING ROOM | Age: 78
End: 2023-01-31
Payer: MEDICARE

## 2023-01-31 ENCOUNTER — HOSPITAL ENCOUNTER (INPATIENT)
Age: 78
LOS: 2 days | Discharge: NURSING, REHAB OR LONG-TERM CARE | DRG: 512 | End: 2023-02-02
Attending: INTERNAL MEDICINE | Admitting: INTERNAL MEDICINE
Payer: MEDICARE

## 2023-01-31 ENCOUNTER — TELEPHONE (OUTPATIENT)
Dept: FAMILY MEDICINE CLINIC | Age: 78
End: 2023-01-31

## 2023-01-31 ENCOUNTER — APPOINTMENT (OUTPATIENT)
Dept: GENERAL RADIOLOGY | Age: 78
DRG: 512 | End: 2023-01-31
Attending: INTERNAL MEDICINE
Payer: MEDICARE

## 2023-01-31 ENCOUNTER — ANESTHESIA EVENT (OUTPATIENT)
Dept: OPERATING ROOM | Age: 78
End: 2023-01-31
Payer: MEDICARE

## 2023-01-31 DIAGNOSIS — S62.101A CLOSED FRACTURE OF RIGHT WRIST, INITIAL ENCOUNTER: Primary | ICD-10-CM

## 2023-01-31 DIAGNOSIS — G25.81 RESTLESS LEGS SYNDROME (RLS): ICD-10-CM

## 2023-01-31 DIAGNOSIS — G47.429 NARCOLEPSY DUE TO UNDERLYING CONDITION WITHOUT CATAPLEXY: ICD-10-CM

## 2023-01-31 DIAGNOSIS — G20 PD (PARKINSON'S DISEASE) (HCC): ICD-10-CM

## 2023-01-31 PROBLEM — S62.102A WRIST FRACTURE, CLOSED, LEFT, INITIAL ENCOUNTER: Status: ACTIVE | Noted: 2023-01-31

## 2023-01-31 PROBLEM — S62.102A WRIST FRACTURE, CLOSED, LEFT, INITIAL ENCOUNTER: Status: RESOLVED | Noted: 2023-01-31 | Resolved: 2023-01-31

## 2023-01-31 LAB
ANION GAP SERPL CALCULATED.3IONS-SCNC: 6 MMOL/L (ref 3–16)
BASOPHILS ABSOLUTE: 0.1 K/UL (ref 0–0.2)
BASOPHILS RELATIVE PERCENT: 0.6 %
BUN BLDV-MCNC: 14 MG/DL (ref 7–20)
CALCIUM SERPL-MCNC: 8.9 MG/DL (ref 8.3–10.6)
CHLORIDE BLD-SCNC: 106 MMOL/L (ref 99–110)
CO2: 25 MMOL/L (ref 21–32)
CREAT SERPL-MCNC: 0.9 MG/DL (ref 0.6–1.2)
EOSINOPHILS ABSOLUTE: 0.2 K/UL (ref 0–0.6)
EOSINOPHILS RELATIVE PERCENT: 2 %
GFR SERPL CREATININE-BSD FRML MDRD: >60 ML/MIN/{1.73_M2}
GLUCOSE BLD-MCNC: 83 MG/DL (ref 70–99)
HCT VFR BLD CALC: 32.6 % (ref 36–48)
HEMOGLOBIN: 11 G/DL (ref 12–16)
LYMPHOCYTES ABSOLUTE: 1.6 K/UL (ref 1–5.1)
LYMPHOCYTES RELATIVE PERCENT: 19.1 %
MCH RBC QN AUTO: 32.5 PG (ref 26–34)
MCHC RBC AUTO-ENTMCNC: 33.7 G/DL (ref 31–36)
MCV RBC AUTO: 96.5 FL (ref 80–100)
MONOCYTES ABSOLUTE: 0.7 K/UL (ref 0–1.3)
MONOCYTES RELATIVE PERCENT: 7.9 %
NEUTROPHILS ABSOLUTE: 6 K/UL (ref 1.7–7.7)
NEUTROPHILS RELATIVE PERCENT: 70.4 %
PDW BLD-RTO: 16.4 % (ref 12.4–15.4)
PLATELET # BLD: 250 K/UL (ref 135–450)
PMV BLD AUTO: 8.2 FL (ref 5–10.5)
POTASSIUM REFLEX MAGNESIUM: 4.3 MMOL/L (ref 3.5–5.1)
RBC # BLD: 3.38 M/UL (ref 4–5.2)
SODIUM BLD-SCNC: 137 MMOL/L (ref 136–145)
TSH SERPL DL<=0.05 MIU/L-ACNC: 0.47 UIU/ML (ref 0.27–4.2)
WBC # BLD: 8.5 K/UL (ref 4–11)

## 2023-01-31 PROCEDURE — 1200000000 HC SEMI PRIVATE

## 2023-01-31 PROCEDURE — 2580000003 HC RX 258: Performed by: NURSE PRACTITIONER

## 2023-01-31 PROCEDURE — 3209999900 FLUORO FOR SURGICAL PROCEDURES

## 2023-01-31 PROCEDURE — 7100000000 HC PACU RECOVERY - FIRST 15 MIN: Performed by: ORTHOPAEDIC SURGERY

## 2023-01-31 PROCEDURE — 6360000002 HC RX W HCPCS: Performed by: ORTHOPAEDIC SURGERY

## 2023-01-31 PROCEDURE — 6360000002 HC RX W HCPCS: Performed by: ANESTHESIOLOGY

## 2023-01-31 PROCEDURE — 6370000000 HC RX 637 (ALT 250 FOR IP): Performed by: ORTHOPAEDIC SURGERY

## 2023-01-31 PROCEDURE — 80048 BASIC METABOLIC PNL TOTAL CA: CPT

## 2023-01-31 PROCEDURE — 6360000002 HC RX W HCPCS

## 2023-01-31 PROCEDURE — 3600000004 HC SURGERY LEVEL 4 BASE: Performed by: ORTHOPAEDIC SURGERY

## 2023-01-31 PROCEDURE — 6370000000 HC RX 637 (ALT 250 FOR IP): Performed by: NURSE PRACTITIONER

## 2023-01-31 PROCEDURE — 3600000002 HC SURGERY LEVEL 2 BASE: Performed by: ORTHOPAEDIC SURGERY

## 2023-01-31 PROCEDURE — 0PSH04Z REPOSITION RIGHT RADIUS WITH INTERNAL FIXATION DEVICE, OPEN APPROACH: ICD-10-PCS | Performed by: ORTHOPAEDIC SURGERY

## 2023-01-31 PROCEDURE — 2780000010 HC IMPLANT OTHER: Performed by: ORTHOPAEDIC SURGERY

## 2023-01-31 PROCEDURE — 6360000002 HC RX W HCPCS: Performed by: SPECIALIST/TECHNOLOGIST

## 2023-01-31 PROCEDURE — 73110 X-RAY EXAM OF WRIST: CPT

## 2023-01-31 PROCEDURE — 3600000012 HC SURGERY LEVEL 2 ADDTL 15MIN: Performed by: ORTHOPAEDIC SURGERY

## 2023-01-31 PROCEDURE — 36415 COLL VENOUS BLD VENIPUNCTURE: CPT

## 2023-01-31 PROCEDURE — 85025 COMPLETE CBC W/AUTO DIFF WBC: CPT

## 2023-01-31 PROCEDURE — 7100000001 HC PACU RECOVERY - ADDTL 15 MIN: Performed by: ORTHOPAEDIC SURGERY

## 2023-01-31 PROCEDURE — 2500000003 HC RX 250 WO HCPCS

## 2023-01-31 PROCEDURE — 71045 X-RAY EXAM CHEST 1 VIEW: CPT

## 2023-01-31 PROCEDURE — 2580000003 HC RX 258

## 2023-01-31 PROCEDURE — C1713 ANCHOR/SCREW BN/BN,TIS/BN: HCPCS | Performed by: ORTHOPAEDIC SURGERY

## 2023-01-31 PROCEDURE — 2580000003 HC RX 258: Performed by: ORTHOPAEDIC SURGERY

## 2023-01-31 PROCEDURE — 2500000003 HC RX 250 WO HCPCS: Performed by: ORTHOPAEDIC SURGERY

## 2023-01-31 PROCEDURE — 3700000000 HC ANESTHESIA ATTENDED CARE: Performed by: ORTHOPAEDIC SURGERY

## 2023-01-31 PROCEDURE — 3600000014 HC SURGERY LEVEL 4 ADDTL 15MIN: Performed by: ORTHOPAEDIC SURGERY

## 2023-01-31 PROCEDURE — 84443 ASSAY THYROID STIM HORMONE: CPT

## 2023-01-31 PROCEDURE — 25607 OPTX DST RD XARTC FX/EPI SEP: CPT | Performed by: ORTHOPAEDIC SURGERY

## 2023-01-31 PROCEDURE — 2720000010 HC SURG SUPPLY STERILE: Performed by: ORTHOPAEDIC SURGERY

## 2023-01-31 PROCEDURE — 3700000001 HC ADD 15 MINUTES (ANESTHESIA): Performed by: ORTHOPAEDIC SURGERY

## 2023-01-31 PROCEDURE — 2709999900 HC NON-CHARGEABLE SUPPLY: Performed by: ORTHOPAEDIC SURGERY

## 2023-01-31 DEVICE — EVOS 2.4MM X 12MM CORTEX SCREW T7 SELF-TAPPING
Type: IMPLANTABLE DEVICE | Site: WRIST | Status: FUNCTIONAL
Brand: EVOS

## 2023-01-31 DEVICE — EVOS 2.4MM X 17MM LOCKING SCREW T7 SELF-TAPPING
Type: IMPLANTABLE DEVICE | Site: WRIST | Status: FUNCTIONAL
Brand: EVOS

## 2023-01-31 DEVICE — EVOS 2.4MM X 15MM LOCKING SCREW T7 SELF-TAPPING
Type: IMPLANTABLE DEVICE | Site: WRIST | Status: FUNCTIONAL
Brand: EVOS

## 2023-01-31 DEVICE — EVOS 2.4MM X 18MM LOCKING SCREW T7 SELF-TAPPING
Type: IMPLANTABLE DEVICE | Site: WRIST | Status: FUNCTIONAL
Brand: EVOS

## 2023-01-31 DEVICE — TROCAR TIP WIRE 1.4MM X 100MM
Type: IMPLANTABLE DEVICE | Site: WRIST | Status: FUNCTIONAL
Brand: D-RAD

## 2023-01-31 DEVICE — EVOS 2.4MM X 11MM CORTEX SCREW T7 SELF-TAPPING
Type: IMPLANTABLE DEVICE | Site: WRIST | Status: FUNCTIONAL
Brand: EVOS

## 2023-01-31 RX ORDER — POLYETHYLENE GLYCOL 3350 17 G/17G
17 POWDER, FOR SOLUTION ORAL DAILY
Status: DISCONTINUED | OUTPATIENT
Start: 2023-02-01 | End: 2023-02-02 | Stop reason: HOSPADM

## 2023-01-31 RX ORDER — OXYCODONE HYDROCHLORIDE 5 MG/1
5 TABLET ORAL EVERY 4 HOURS PRN
Status: DISCONTINUED | OUTPATIENT
Start: 2023-01-31 | End: 2023-02-02 | Stop reason: HOSPADM

## 2023-01-31 RX ORDER — DIPHENHYDRAMINE HYDROCHLORIDE 50 MG/ML
12.5 INJECTION INTRAMUSCULAR; INTRAVENOUS
Status: DISCONTINUED | OUTPATIENT
Start: 2023-01-31 | End: 2023-01-31 | Stop reason: HOSPADM

## 2023-01-31 RX ORDER — BUPIVACAINE HYDROCHLORIDE AND EPINEPHRINE 5; 5 MG/ML; UG/ML
INJECTION, SOLUTION PERINEURAL PRN
Status: DISCONTINUED | OUTPATIENT
Start: 2023-01-31 | End: 2023-01-31 | Stop reason: HOSPADM

## 2023-01-31 RX ORDER — FENTANYL CITRATE 50 UG/ML
INJECTION, SOLUTION INTRAMUSCULAR; INTRAVENOUS PRN
Status: DISCONTINUED | OUTPATIENT
Start: 2023-01-31 | End: 2023-01-31 | Stop reason: SDUPTHER

## 2023-01-31 RX ORDER — SODIUM CHLORIDE 0.9 % (FLUSH) 0.9 %
5-40 SYRINGE (ML) INJECTION PRN
Status: DISCONTINUED | OUTPATIENT
Start: 2023-01-31 | End: 2023-01-31 | Stop reason: SDUPTHER

## 2023-01-31 RX ORDER — LIDOCAINE HYDROCHLORIDE 20 MG/ML
INJECTION, SOLUTION EPIDURAL; INFILTRATION; INTRACAUDAL; PERINEURAL PRN
Status: DISCONTINUED | OUTPATIENT
Start: 2023-01-31 | End: 2023-01-31 | Stop reason: SDUPTHER

## 2023-01-31 RX ORDER — TRAZODONE HYDROCHLORIDE 50 MG/1
100 TABLET ORAL NIGHTLY
Status: DISCONTINUED | OUTPATIENT
Start: 2023-01-31 | End: 2023-02-02 | Stop reason: HOSPADM

## 2023-01-31 RX ORDER — SODIUM CHLORIDE 9 MG/ML
INJECTION, SOLUTION INTRAVENOUS PRN
Status: DISCONTINUED | OUTPATIENT
Start: 2023-01-31 | End: 2023-02-02 | Stop reason: HOSPADM

## 2023-01-31 RX ORDER — OXYCODONE HYDROCHLORIDE 5 MG/1
10 TABLET ORAL EVERY 4 HOURS PRN
Status: DISCONTINUED | OUTPATIENT
Start: 2023-01-31 | End: 2023-02-02 | Stop reason: HOSPADM

## 2023-01-31 RX ORDER — DEXAMETHASONE SODIUM PHOSPHATE 4 MG/ML
INJECTION, SOLUTION INTRA-ARTICULAR; INTRALESIONAL; INTRAMUSCULAR; INTRAVENOUS; SOFT TISSUE PRN
Status: DISCONTINUED | OUTPATIENT
Start: 2023-01-31 | End: 2023-01-31 | Stop reason: SDUPTHER

## 2023-01-31 RX ORDER — POLYETHYLENE GLYCOL 3350 17 G/17G
17 POWDER, FOR SOLUTION ORAL DAILY PRN
Status: DISCONTINUED | OUTPATIENT
Start: 2023-01-31 | End: 2023-01-31

## 2023-01-31 RX ORDER — ONDANSETRON 2 MG/ML
4 INJECTION INTRAMUSCULAR; INTRAVENOUS EVERY 6 HOURS PRN
Status: DISCONTINUED | OUTPATIENT
Start: 2023-01-31 | End: 2023-02-02 | Stop reason: HOSPADM

## 2023-01-31 RX ORDER — HYDROCODONE BITARTRATE AND ACETAMINOPHEN 5; 325 MG/1; MG/1
1 TABLET ORAL EVERY 6 HOURS PRN
Status: ON HOLD | COMMUNITY
End: 2023-02-02 | Stop reason: SDUPTHER

## 2023-01-31 RX ORDER — ENOXAPARIN SODIUM 100 MG/ML
40 INJECTION SUBCUTANEOUS DAILY
Status: DISCONTINUED | OUTPATIENT
Start: 2023-01-31 | End: 2023-02-02 | Stop reason: HOSPADM

## 2023-01-31 RX ORDER — ACETAMINOPHEN 325 MG/1
650 TABLET ORAL EVERY 6 HOURS PRN
Status: DISCONTINUED | OUTPATIENT
Start: 2023-01-31 | End: 2023-01-31

## 2023-01-31 RX ORDER — CEFAZOLIN SODIUM IN 0.9 % NACL 2 G/100 ML
2000 PLASTIC BAG, INJECTION (ML) INTRAVENOUS EVERY 8 HOURS
Status: COMPLETED | OUTPATIENT
Start: 2023-01-31 | End: 2023-02-01

## 2023-01-31 RX ORDER — PROPOFOL 10 MG/ML
INJECTION, EMULSION INTRAVENOUS PRN
Status: DISCONTINUED | OUTPATIENT
Start: 2023-01-31 | End: 2023-01-31 | Stop reason: SDUPTHER

## 2023-01-31 RX ORDER — SODIUM CHLORIDE, SODIUM LACTATE, POTASSIUM CHLORIDE, CALCIUM CHLORIDE 600; 310; 30; 20 MG/100ML; MG/100ML; MG/100ML; MG/100ML
INJECTION, SOLUTION INTRAVENOUS CONTINUOUS PRN
Status: DISCONTINUED | OUTPATIENT
Start: 2023-01-31 | End: 2023-01-31 | Stop reason: SDUPTHER

## 2023-01-31 RX ORDER — ONDANSETRON 4 MG/1
4 TABLET, ORALLY DISINTEGRATING ORAL EVERY 8 HOURS PRN
Status: DISCONTINUED | OUTPATIENT
Start: 2023-01-31 | End: 2023-01-31 | Stop reason: SDUPTHER

## 2023-01-31 RX ORDER — LEVOTHYROXINE SODIUM 0.12 MG/1
125 TABLET ORAL DAILY
Status: ON HOLD | COMMUNITY
End: 2023-02-02 | Stop reason: HOSPADM

## 2023-01-31 RX ORDER — SODIUM CHLORIDE 0.9 % (FLUSH) 0.9 %
5-40 SYRINGE (ML) INJECTION EVERY 12 HOURS SCHEDULED
Status: DISCONTINUED | OUTPATIENT
Start: 2023-01-31 | End: 2023-01-31 | Stop reason: HOSPADM

## 2023-01-31 RX ORDER — PANTOPRAZOLE SODIUM 40 MG/1
40 TABLET, DELAYED RELEASE ORAL DAILY
Status: DISCONTINUED | OUTPATIENT
Start: 2023-01-31 | End: 2023-02-02 | Stop reason: HOSPADM

## 2023-01-31 RX ORDER — CEFAZOLIN SODIUM IN 0.9 % NACL 2 G/100 ML
2000 PLASTIC BAG, INJECTION (ML) INTRAVENOUS
Status: COMPLETED | OUTPATIENT
Start: 2023-01-31 | End: 2023-01-31

## 2023-01-31 RX ORDER — CELECOXIB 100 MG/1
200 CAPSULE ORAL 2 TIMES DAILY
Status: DISCONTINUED | OUTPATIENT
Start: 2023-01-31 | End: 2023-02-02 | Stop reason: HOSPADM

## 2023-01-31 RX ORDER — ONDANSETRON 2 MG/ML
4 INJECTION INTRAMUSCULAR; INTRAVENOUS EVERY 6 HOURS PRN
Status: DISCONTINUED | OUTPATIENT
Start: 2023-01-31 | End: 2023-01-31 | Stop reason: SDUPTHER

## 2023-01-31 RX ORDER — SODIUM CHLORIDE 0.9 % (FLUSH) 0.9 %
5-40 SYRINGE (ML) INJECTION PRN
Status: DISCONTINUED | OUTPATIENT
Start: 2023-01-31 | End: 2023-02-02 | Stop reason: HOSPADM

## 2023-01-31 RX ORDER — ONDANSETRON 2 MG/ML
4 INJECTION INTRAMUSCULAR; INTRAVENOUS
Status: DISCONTINUED | OUTPATIENT
Start: 2023-01-31 | End: 2023-01-31 | Stop reason: HOSPADM

## 2023-01-31 RX ORDER — CEFAZOLIN SODIUM IN 0.9 % NACL 2 G/100 ML
2000 PLASTIC BAG, INJECTION (ML) INTRAVENOUS EVERY 8 HOURS
Status: DISCONTINUED | OUTPATIENT
Start: 2023-01-31 | End: 2023-01-31 | Stop reason: SDUPTHER

## 2023-01-31 RX ORDER — ATORVASTATIN CALCIUM 40 MG/1
40 TABLET, FILM COATED ORAL DAILY
Status: DISCONTINUED | OUTPATIENT
Start: 2023-01-31 | End: 2023-02-02 | Stop reason: HOSPADM

## 2023-01-31 RX ORDER — ROCURONIUM BROMIDE 10 MG/ML
INJECTION, SOLUTION INTRAVENOUS PRN
Status: DISCONTINUED | OUTPATIENT
Start: 2023-01-31 | End: 2023-01-31 | Stop reason: SDUPTHER

## 2023-01-31 RX ORDER — ONDANSETRON 2 MG/ML
INJECTION INTRAMUSCULAR; INTRAVENOUS PRN
Status: DISCONTINUED | OUTPATIENT
Start: 2023-01-31 | End: 2023-01-31 | Stop reason: SDUPTHER

## 2023-01-31 RX ORDER — AMLODIPINE BESYLATE 5 MG/1
10 TABLET ORAL DAILY
Status: DISCONTINUED | OUTPATIENT
Start: 2023-01-31 | End: 2023-02-02 | Stop reason: HOSPADM

## 2023-01-31 RX ORDER — SODIUM CHLORIDE 9 MG/ML
INJECTION, SOLUTION INTRAVENOUS PRN
Status: DISCONTINUED | OUTPATIENT
Start: 2023-01-31 | End: 2023-01-31 | Stop reason: SDUPTHER

## 2023-01-31 RX ORDER — SODIUM CHLORIDE 9 MG/ML
25 INJECTION, SOLUTION INTRAVENOUS PRN
Status: DISCONTINUED | OUTPATIENT
Start: 2023-01-31 | End: 2023-01-31 | Stop reason: HOSPADM

## 2023-01-31 RX ORDER — SODIUM CHLORIDE 0.9 % (FLUSH) 0.9 %
5-40 SYRINGE (ML) INJECTION EVERY 12 HOURS SCHEDULED
Status: DISCONTINUED | OUTPATIENT
Start: 2023-01-31 | End: 2023-02-02 | Stop reason: HOSPADM

## 2023-01-31 RX ORDER — LABETALOL HYDROCHLORIDE 5 MG/ML
10 INJECTION, SOLUTION INTRAVENOUS
Status: DISCONTINUED | OUTPATIENT
Start: 2023-01-31 | End: 2023-01-31 | Stop reason: HOSPADM

## 2023-01-31 RX ORDER — SODIUM CHLORIDE 0.9 % (FLUSH) 0.9 %
5-40 SYRINGE (ML) INJECTION EVERY 12 HOURS SCHEDULED
Status: DISCONTINUED | OUTPATIENT
Start: 2023-01-31 | End: 2023-01-31 | Stop reason: SDUPTHER

## 2023-01-31 RX ORDER — OXYCODONE HYDROCHLORIDE 5 MG/1
5 TABLET ORAL PRN
Status: DISCONTINUED | OUTPATIENT
Start: 2023-01-31 | End: 2023-01-31 | Stop reason: HOSPADM

## 2023-01-31 RX ORDER — ROPINIROLE 0.5 MG/1
1 TABLET, FILM COATED ORAL 3 TIMES DAILY
Status: DISCONTINUED | OUTPATIENT
Start: 2023-01-31 | End: 2023-02-02 | Stop reason: HOSPADM

## 2023-01-31 RX ORDER — HYDROCODONE BITARTRATE AND ACETAMINOPHEN 5; 325 MG/1; MG/1
1 TABLET ORAL EVERY 6 HOURS PRN
Status: DISCONTINUED | OUTPATIENT
Start: 2023-01-31 | End: 2023-01-31

## 2023-01-31 RX ORDER — ACETAMINOPHEN 650 MG/1
650 SUPPOSITORY RECTAL EVERY 6 HOURS PRN
Status: DISCONTINUED | OUTPATIENT
Start: 2023-01-31 | End: 2023-01-31

## 2023-01-31 RX ORDER — ONDANSETRON 4 MG/1
4 TABLET, ORALLY DISINTEGRATING ORAL EVERY 8 HOURS PRN
Status: DISCONTINUED | OUTPATIENT
Start: 2023-01-31 | End: 2023-02-02 | Stop reason: HOSPADM

## 2023-01-31 RX ORDER — SODIUM CHLORIDE 0.9 % (FLUSH) 0.9 %
5-40 SYRINGE (ML) INJECTION PRN
Status: DISCONTINUED | OUTPATIENT
Start: 2023-01-31 | End: 2023-01-31 | Stop reason: HOSPADM

## 2023-01-31 RX ORDER — DULOXETIN HYDROCHLORIDE 60 MG/1
120 CAPSULE, DELAYED RELEASE ORAL DAILY
Status: DISCONTINUED | OUTPATIENT
Start: 2023-01-31 | End: 2023-02-02 | Stop reason: HOSPADM

## 2023-01-31 RX ORDER — LEVOTHYROXINE SODIUM 112 UG/1
112 TABLET ORAL DAILY
Status: DISCONTINUED | OUTPATIENT
Start: 2023-01-31 | End: 2023-02-02 | Stop reason: HOSPADM

## 2023-01-31 RX ORDER — ACETAMINOPHEN 325 MG/1
650 TABLET ORAL EVERY 6 HOURS
Status: DISCONTINUED | OUTPATIENT
Start: 2023-01-31 | End: 2023-02-02 | Stop reason: HOSPADM

## 2023-01-31 RX ORDER — OXYCODONE HYDROCHLORIDE 5 MG/1
10 TABLET ORAL PRN
Status: DISCONTINUED | OUTPATIENT
Start: 2023-01-31 | End: 2023-01-31 | Stop reason: HOSPADM

## 2023-01-31 RX ORDER — ARIPIPRAZOLE 5 MG/1
5 TABLET ORAL DAILY
Status: DISCONTINUED | OUTPATIENT
Start: 2023-01-31 | End: 2023-02-02 | Stop reason: HOSPADM

## 2023-01-31 RX ADMIN — CARBIDOPA AND LEVODOPA 1 TABLET: 25; 100 TABLET ORAL at 08:48

## 2023-01-31 RX ADMIN — CARBIDOPA AND LEVODOPA 1 TABLET: 25; 100 TABLET ORAL at 21:22

## 2023-01-31 RX ADMIN — ROPINIROLE HYDROCHLORIDE 1 MG: 0.5 TABLET, FILM COATED ORAL at 16:31

## 2023-01-31 RX ADMIN — LEVOTHYROXINE SODIUM 112 MCG: 112 TABLET ORAL at 07:13

## 2023-01-31 RX ADMIN — DEXAMETHASONE SODIUM PHOSPHATE 8 MG: 4 INJECTION, SOLUTION INTRAMUSCULAR; INTRAVENOUS at 13:25

## 2023-01-31 RX ADMIN — CARBIDOPA AND LEVODOPA 1 TABLET: 25; 100 TABLET ORAL at 16:31

## 2023-01-31 RX ADMIN — PROPOFOL 110 MG: 10 INJECTION, EMULSION INTRAVENOUS at 13:08

## 2023-01-31 RX ADMIN — SUGAMMADEX 150 MG: 100 INJECTION, SOLUTION INTRAVENOUS at 14:36

## 2023-01-31 RX ADMIN — Medication 10 ML: at 08:48

## 2023-01-31 RX ADMIN — SUGAMMADEX 50 MG: 100 INJECTION, SOLUTION INTRAVENOUS at 14:32

## 2023-01-31 RX ADMIN — SODIUM CHLORIDE, SODIUM LACTATE, POTASSIUM CHLORIDE, AND CALCIUM CHLORIDE: .6; .31; .03; .02 INJECTION, SOLUTION INTRAVENOUS at 13:03

## 2023-01-31 RX ADMIN — ROCURONIUM BROMIDE 40 MG: 10 SOLUTION INTRAVENOUS at 13:11

## 2023-01-31 RX ADMIN — HYDROMORPHONE HYDROCHLORIDE 0.5 MG: 1 INJECTION, SOLUTION INTRAMUSCULAR; INTRAVENOUS; SUBCUTANEOUS at 15:14

## 2023-01-31 RX ADMIN — TRAZODONE HYDROCHLORIDE 100 MG: 50 TABLET ORAL at 21:22

## 2023-01-31 RX ADMIN — Medication 10 ML: at 21:22

## 2023-01-31 RX ADMIN — HYDROCODONE BITARTRATE AND ACETAMINOPHEN 1 TABLET: 5; 325 TABLET ORAL at 16:31

## 2023-01-31 RX ADMIN — ROCURONIUM BROMIDE 10 MG: 10 SOLUTION INTRAVENOUS at 13:29

## 2023-01-31 RX ADMIN — ROPINIROLE HYDROCHLORIDE 1 MG: 0.5 TABLET, FILM COATED ORAL at 08:47

## 2023-01-31 RX ADMIN — Medication 2000 MG: at 09:44

## 2023-01-31 RX ADMIN — ACETAMINOPHEN 325MG 650 MG: 325 TABLET ORAL at 07:12

## 2023-01-31 RX ADMIN — LIDOCAINE HYDROCHLORIDE 60 MG: 20 INJECTION, SOLUTION EPIDURAL; INFILTRATION; INTRACAUDAL; PERINEURAL at 13:08

## 2023-01-31 RX ADMIN — ACETAMINOPHEN 325MG 650 MG: 325 TABLET ORAL at 21:26

## 2023-01-31 RX ADMIN — ONDANSETRON 4 MG: 2 INJECTION INTRAMUSCULAR; INTRAVENOUS at 13:25

## 2023-01-31 RX ADMIN — HYDROMORPHONE HYDROCHLORIDE 0.5 MG: 1 INJECTION, SOLUTION INTRAMUSCULAR; INTRAVENOUS; SUBCUTANEOUS at 15:26

## 2023-01-31 RX ADMIN — FENTANYL CITRATE 12.5 MCG: 50 INJECTION INTRAMUSCULAR; INTRAVENOUS at 14:50

## 2023-01-31 RX ADMIN — ROCURONIUM BROMIDE 10 MG: 10 SOLUTION INTRAVENOUS at 14:08

## 2023-01-31 RX ADMIN — Medication 2000 MG: at 21:25

## 2023-01-31 RX ADMIN — Medication 2000 MG: at 13:20

## 2023-01-31 RX ADMIN — ROPINIROLE HYDROCHLORIDE 1 MG: 0.5 TABLET, FILM COATED ORAL at 21:22

## 2023-01-31 ASSESSMENT — PAIN SCALES - GENERAL
PAINLEVEL_OUTOF10: 7
PAINLEVEL_OUTOF10: 4
PAINLEVEL_OUTOF10: 3
PAINLEVEL_OUTOF10: 4
PAINLEVEL_OUTOF10: 0
PAINLEVEL_OUTOF10: 3

## 2023-01-31 ASSESSMENT — PAIN DESCRIPTION - DESCRIPTORS
DESCRIPTORS: CRUSHING;ACHING;DISCOMFORT
DESCRIPTORS: ACHING

## 2023-01-31 ASSESSMENT — PAIN DESCRIPTION - ORIENTATION
ORIENTATION: RIGHT

## 2023-01-31 ASSESSMENT — PAIN DESCRIPTION - LOCATION
LOCATION: WRIST
LOCATION: ARM
LOCATION: WRIST
LOCATION: WRIST

## 2023-01-31 NOTE — TELEPHONE ENCOUNTER
This issue should be able to be discussed with  at the admitting facility, in this case, [facility].   Recommend they discuss this with them so appropriate discharge disposition can be made

## 2023-01-31 NOTE — TELEPHONE ENCOUNTER
Pt spouse called and states pt was admitted to [facility] yesterday for broken right wrist and is having surgery this afternoon around 330 and may be released tomorrow depending on how she is doing. Pt spouse states that after her hip injury she was in [facility] and wants to know what are the minimum requirements for pt to go there. States pt will need assistance getting dressed and everyday activities that he feels he will need help with. Please advise.  Thank You

## 2023-01-31 NOTE — CONSULTS
ORTHOPAEDIC SURGERY INITIAL EVALUATION NOTE    CHIEF COMPLAINT: right wrist injury     HISTORY OF PRESENT ILLNESS:  68-year-old female with history of Parkinson's dementia, hypertension, COPD presents as a transfer from [facility] with a right open wrist fracture. Per record review, the patient was found lying on the floor. She reportedly fell over her walker. X-rays at the outside facility demonstrated a displaced comminuted distal radius fracture/ulna fracture. She was given Ancef at the outside hospital and her wound was irrigated. She was placed into a splint. She was transferred for further evaluation and care. Currently, she is comfortable at rest.  She denies significant pain. She is able to move her fingers.     Past Medical History:   Diagnosis Date    Blood transfusion reaction 2003    Cancer [facility])     breast cancer    History of blood transfusion     Hypertension     Thyroid disease     Unspecified cerebral artery occlusion with cerebral infarction        Current Facility-Administered Medications   Medication Dose Route Frequency Provider Last Rate Last Admin    amLODIPine (NORVASC) tablet 10 mg  1 tablet Oral Daily Leida Rumps, APRN - CNP        ARIPiprazole (ABILIFY) tablet 5 mg  5 mg Oral Daily Leida Rumps, APRN - CNP        carbidopa-levodopa (SINEMET)  MG per tablet 1 tablet  1 tablet Oral TID Leida Garcia, APRN - CNP        celecoxib (CELEBREX) capsule 200 mg  200 mg Oral BID Leida Garcia, APRN - CNP        DULoxetine (CYMBALTA) extended release capsule 120 mg  120 mg Oral Daily Leida Rumps, APRN - CNP        HYDROcodone-acetaminophen (NORCO) 5-325 MG per tablet 1 tablet  1 tablet Oral Q6H PRN Leida Jose, APRN - CNP        levothyroxine (SYNTHROID) tablet 112 mcg  112 mcg Oral Daily Leida Jose, APRN - CNP        pantoprazole (PROTONIX) tablet 40 mg  40 mg Oral Daily Leida Jose, APRN - CNP        rOPINIRole (REQUIP) tablet 1 mg  1 mg Oral TID Socorro Robison Knabb, APRN - CNP        traZODone (DESYREL) tablet 100 mg  100 mg Oral Nightly Dory Rasp, APRN - CNP        atorvastatin (LIPITOR) tablet 40 mg  40 mg Oral Daily Dory Rasp, APRN - CNP        sodium chloride flush 0.9 % injection 5-40 mL  5-40 mL IntraVENous 2 times per day Dory Rasp, APRN - CNP        sodium chloride flush 0.9 % injection 5-40 mL  5-40 mL IntraVENous PRN Dory Rasp, APRN - CNP        0.9 % sodium chloride infusion   IntraVENous PRN Dory Rasp, APRN - CNP        enoxaparin (LOVENOX) injection 40 mg  40 mg SubCUTAneous Daily Dory Rasp, APRN - CNP        ondansetron (ZOFRAN-ODT) disintegrating tablet 4 mg  4 mg Oral Q8H PRN Dory Rasp, APRN - CNP        Or    ondansetron (ZOFRAN) injection 4 mg  4 mg IntraVENous Q6H PRN Dory Rasp, APRN - CNP        polyethylene glycol (GLYCOLAX) packet 17 g  17 g Oral Daily PRN Dory Rasp, APRN - CNP        acetaminophen (TYLENOL) tablet 650 mg  650 mg Oral Q6H PRN Dory Rasp, APRN - CNP        Or    acetaminophen (TYLENOL) suppository 650 mg  650 mg Rectal Q6H PRN Dory Rasp, APRN - CNP        ceFAZolin (ANCEF) 2000 mg in 0.9% sodium chloride 100 mL IVPB  2,000 mg IntraVENous Q8H Pastor Porfirio MD            Past Surgical History:   Procedure Laterality Date    BREAST SURGERY      lumpectomy    COLONOSCOPY  11/05/2015 2003    COLONOSCOPY  11/05/2015    HIP FRACTURE SURGERY Right 2022    THORACOTOMY  01/01/1984    benign tumor removal    UPPER GASTROINTESTINAL ENDOSCOPY  11/05/2015       No Known Allergies    Family History   Problem Relation Age of Onset    Cancer Mother         ovarian and colon    Cancer Father         lung    COPD Brother        Social History     Socioeconomic History    Marital status:      Spouse name: Not on file    Number of children: Not on file    Years of education: Not on file    Highest education level: Not on file   Occupational History    Not on file   Tobacco Use    Smoking status: Former Packs/day: 1.00     Years: 10.00     Pack years: 10.00     Types: Cigarettes     Quit date: 1970     Years since quittin.1    Smokeless tobacco: Never    Tobacco comments:     Quit 40 years ago   Vaping Use    Vaping Use: Never used   Substance and Sexual Activity    Alcohol use: Yes     Alcohol/week: 1.0 standard drink     Types: 1 Cans of beer per week     Comment: 1 beer daily    Drug use: No    Sexual activity: Not on file   Other Topics Concern    Not on file   Social History Narrative    Not on file     Social Determinants of Health     Financial Resource Strain: Low Risk     Difficulty of Paying Living Expenses: Not hard at all   Food Insecurity: No Food Insecurity    Worried About Running Out of Food in the Last Year: Never true    Ran Out of Food in the Last Year: Never true   Transportation Needs: Not on file   Physical Activity: Not on file   Stress: Not on file   Social Connections: Not on file   Intimate Partner Violence: Not on file   Housing Stability: Not on file     Review of Systems   Unable to perform ROS: Dementia      PHYSICAL EXAM  Vitals:    23 0100 23 0113   BP: (!) 142/78    Pulse: 94    Resp: 15    Temp: 97.8 °F (36.6 °C)    TempSrc: Oral    SpO2: 97%    Weight:  136 lb (61.7 kg)   Height:  5' 3\" (1.6 m)     Gen: awake, alert, oriented  HEENT: atraumatic, normocephalic  Neck: supple  Resp: equal chest rise bilaterally, no audible wheezes, no accessory muscle use. CV: Lower extremities warm and well perfused. Abd: soft, nontender   Focused examination of the right upper extremity:  Presents in sugar-tong splint. The splint was windowed in order to evaluate the wound. Underlying the splint material there was a Band-Aid over the dorsal ulna. This had mild bloody saturation. There is a punctate wound overlying the distal ulna dorsally. Otherwise, no cuts, open wounds, or abrasions. Sensation is intact to light touch distally in all nerve distributions.   There is brisk capillary refill in the fingers. Compartments are soft and compressible. LABS  WBC 8.5  Hemoglobin 11.0  Platelets 601  Creatinine 0.9    RADIOGRAPHIC EXAM:  3 views of the right wrist including PA, oblique, and lateral projections demonstrate a volarly displaced distal radius fracture with severe comminution. There is an associated distal ulna fracture. There is minor widening of the scapholunate interval, unclear if this is acute. There is severe degenerative changes involving the basal joint. ASSESSEMENT AND PLAN    76 y.o. female with the following orthopaedic surgery problems:    Right type I open distal radius/ulna fracture    IV Ancef per open fracture protocol  Nonweightbearing right upper extremity  Pain control  Sugar-tong splint  Ice/elevation  N.p.o.  Recommend operative intervention in the form of irrigation debridement of open fracture wound and open reduction internal fixation right distal radius/ulna fracture  Recommend proceeding on an urgent basis today. Hold anticoagulants  Appreciate hospitalist team comanagement.     Linda Loredo MD

## 2023-01-31 NOTE — PROGRESS NOTES
Pt arrived to pacu in stable condition. Bedside report received from [address] [facility] and [facility].

## 2023-01-31 NOTE — H&P
Hospital Medicine History & Physical      PCP: Tere Pelaez MD    Date of Admission: 1/31/2023    Date of Service: Pt seen/examined on 1/31/2023 and Admitted to Inpatient with expected LOS greater than two midnights due to medical therapy. Chief Complaint: Right wrist pain    History Of Present Illness:      76 y.o. female, with PMH of dementia, parkinson's, HTN, COPD, and HLD, who was a direct admit from [facility] to [facility] with a right wrist fracture. History obtained from the patient and review of medical records. The patient stated she does not remember her fall very well. She stated she does remember using her walker and then being on the floor. The patient stated she is not sure if she hit her head and she denies loss of consciousness. Per medical records, the patient was lying on the floor for 2 hours before being found. Upon arrival the patient was found to have an obvious deformity of the right wrist. In the emergency room at [facility] and x-ray of the right wrist was obtained that revealed a comminuted distal radius and ulna fracture with apical dorsal angulation. There is significant displacement of the distal radius. The patient's right wrist was splinted. She was ultimately transferred to [facility] for further evaluation and treatment. Orthopedic surgery has been consulted. The patient denied any other associated symptoms as well as any aggravating and/or alleviating factors. At the time of this assessment, the patient was resting comfortably in bed. She currently denies any chest pain, back pain, abdominal pain, shortness of breath, numbness, tingling, N/V/C/D, fever and/or chills.      Past Medical History:          Diagnosis Date    Blood transfusion reaction 2003    Cancer [facility])     breast cancer    History of blood transfusion     Hypertension     Thyroid disease     Unspecified cerebral artery occlusion with cerebral infarction      Past Surgical History: Procedure Laterality Date    BREAST SURGERY      lumpectomy    COLONOSCOPY  11/05/2015 2003    COLONOSCOPY  11/05/2015    HIP FRACTURE SURGERY Right 2022    THORACOTOMY  01/01/1984    benign tumor removal    UPPER GASTROINTESTINAL ENDOSCOPY  11/05/2015     Medications Prior to Admission:      Prior to Admission medications    Medication Sig Start Date End Date Taking? Authorizing Provider   carbidopa-levodopa (SINEMET)  MG per tablet Take 1 tablet by mouth 3 times daily   Yes Historical Provider, MD   HYDROcodone-acetaminophen (NORCO) 5-325 MG per tablet Take 1 tablet by mouth every 6 hours as needed for Pain. Yes Historical Provider, MD   levothyroxine (SYNTHROID) 125 MCG tablet Take 125 mcg by mouth Daily   Yes Historical Provider, MD   carbidopa-levodopa (SINEMET)  MG per tablet TAKE ONE TABLET BY MOUTH 8 TIMES DAY  Patient not taking: Reported on 1/31/2023 1/17/23   Alverto Juares MD   celecoxib (CELEBREX) 200 MG capsule Take 1 capsule by mouth 2 times daily 12/19/22   Cain Aly MD   traZODone (DESYREL) 50 MG tablet TAKE 2 TABLETS BY MOUTH NIGHTLY 7/20/22 1/5/23  AKBAR De La Cruz CNP   simvastatin (ZOCOR) 40 MG tablet TAKE ONE TABLET BY MOUTH NIGHTLY 7/18/22   AKBAR De La Cruz CNP   rOPINIRole (REQUIP) 1 MG tablet Take 1 tablet by mouth 3 times daily  Patient taking differently: Take 1 mg by mouth nightly 5/16/22   AKBAR Vera CNP   amLODIPine (NORVASC) 10 MG tablet TAKE ONE TABLET BY MOUTH EVERY DAY 5/9/22   Cain Aly MD   Misc.  Devices (ROLLATOR ULTRA-LIGHT) MISC 1 each by Does not apply route daily 3/21/22   Cain Aly MD   ARIPiprazole (ABILIFY) 5 MG tablet  7/21/21   Historical Provider, MD   DULoxetine (CYMBALTA) 60 MG extended release capsule 120 mg 2 cap daily 9/13/21   Historical Provider, MD   methylphenidate (RITALIN) 10 MG tablet  9/27/21   Historical Provider, MD   omeprazole (PRILOSEC) 20 MG capsule Take 20 mg by mouth daily. Indications: take [address] [employer] Provider, MD     Allergies:  Patient has no known allergies. Social History:      The patient currently lives at home    TOBACCO:   reports that she quit smoking about 53 years ago. Her smoking use included cigarettes. She has a 10.00 pack-year smoking history. She has never used smokeless tobacco.  ETOH:   reports current alcohol use of about 1.0 standard drink per week. E-cigarette/Vaping       Questions Responses    E-cigarette/Vaping Use Never User    Start Date     Passive Exposure     Quit Date     Counseling Given     Comments           Family History:      Reviewed and negative in regards to presenting illness/complaint. Problem Relation Age of Onset    Cancer Mother         ovarian and colon    Cancer Father         lung    COPD Brother      REVIEW OF SYSTEMS COMPLETED:   Pertinent positives as noted in the HPI. All other systems reviewed and negative. PHYSICAL EXAM PERFORMED:    BP (!) 142/78   Pulse 94   Temp 97.8 °F (36.6 °C) (Oral)   Resp 15   Ht 5' 3\" (1.6 m)   Wt 136 lb (61.7 kg)   SpO2 97%   BMI 24.09 kg/m²     General appearance:  Pleasant, elderly female in no apparent distress, appears stated age and cooperative. HEENT:  Pupils equal, round, and reactive to light. Extra ocular muscles intact. Conjunctivae/corneas clear. Neck: Supple, with full range of motion. No jugular venous distention. Trachea midline. Respiratory:  Normal respiratory effort. Clear to auscultation, bilaterally without Rales/Wheezes/Rhonchi. Cardiovascular:  Regular rate and rhythm with normal S1/S2 without murmurs, rubs or gallops. Abdomen: Soft, non-tender, non-distended with normal bowel sounds. Musculoskeletal:  No clubbing, cyanosis or edema bilaterally. Decreased ROM L wrist d/t pain, splint intact  Skin: Skin color, texture, turgor normal.  No significant rashes or lesions. Neurologic:  Neurovascularly intact.  Cranial nerves: II-XII intact, grossly non-focal.  Psychiatric:  Alert and oriented, thought content appropriate, normal insight  Capillary Refill: Brisk,3 seconds, normal  Peripheral Pulses: +2 palpable, equal bilaterally     Labs:     Recent Labs     01/30/23  1600   WBC 13.8*   HGB 12.1   HCT 38.0        Recent Labs     01/30/23  1600      K 4.1      CO2 23   BUN 17   CREATININE 1.0   CALCIUM 9.3     Recent Labs     01/30/23  1600   AST 22   ALT 8   BILITOT 0.7   ALKPHOS 147*     Recent Labs     01/30/23  1600   TROPONINI <0.012     Urinalysis:      Lab Results   Component Value Date/Time    BACTERIA MANY 01/30/2023 09:18 PM    BLOODU Large 05/23/2022 01:46 PM    SPECGRAV >=1.030 01/30/2023 09:18 PM    GLUCOSEU NEGATIVE 01/30/2023 09:18 PM     Radiology:     CXR: I have reviewed the CXR with the following interpretation: pending    EKG:  I have reviewed the EKG with the following interpretation: The Ekg interpreted in the absence of a cardiologist shows. Sinus rhythm, rate 92. No ST elevation and/or depression noted I had    No orders to display     Consults:    IP CONSULT TO ORTHOPEDIC SURGERY    ASSESSMENT:    Active Hospital Problems    Diagnosis Date Noted    Right wrist fracture [S62.101A] 01/31/2023     Priority: Medium    Dyslipidemia [E78.5] 04/08/2021    COPD, moderate ([facility] [address] [J44.9] 02/02/2021    Dementia due to Parkinson's disease without behavioral disturbance ([facility] [address] [G20, F02.80] 04/29/2019    HTN (hypertension), benign [I10] 04/29/2019     PLAN:    Rate wrist pain in setting of compound fracture 2/2 fall  -CT area: No acute intracranial abnormality  -Right wrist x-ray revealed: There is a comminuted distal radius and ulna fracture with apical dorsal angulation. There is significant displacement of the distal radius. Fracture is comminuted.   -NPO  -orthopedic surgery consult - appreciate recommendations in advance  -PRN pain medication   Based on the evaluation on 1/31/2023, and diagnostic testing including EKG, there are no apparent cardiac contraindications shall there be a proposed procedure. All questions/concerns that could be addressed were addressed. . Encourage use of incentive spirometry although patient is at low risk for perioperative cardiopulmonary complication. Deion Engel estimated risk probability for perioperative MI or cardiac arrest is 1.53%. The patient appears to be an appropriate candidate shall a planned procedure be implemented, if ortho deemed necessary. Dementia d/t Parkinson's disease  -supportive care  -continue sinemet    Essential HTN  -continue amlodipine    HLD  -continue atorvastatin    COPD, stable  -does not appear to be in acute exacerbation at this time  -oxygen therapy if needed  -continue home inhalers    Hypothyroid  -continue levothyroxine    Anxiety depression  -mood appears stable at this time  -continue home medications    DVT Prophylaxis: Lovenox after surgery    Diet: Diet NPO    Code Status: Full Code    PT/OT Eval Status: no indication for need at this time    Dispo - 2-3 days pending clinical improvement     Ivan Saldivar, AKBAR - CNP    Thank you Haider Estrella MD for the opportunity to be involved in this patient's care. If you have any questions or concerns please feel free to contact me at [phone].  ---------------------------------Anticipated DR. Trace guy-----------------------------

## 2023-01-31 NOTE — PROGRESS NOTES
4 Eyes Skin Assessment     The patient is being assess for   Admission    I agree that 2 RN's have performed a thorough Head to Toe Skin Assessment on the patient. ALL assessment sites listed below have been assessed. Scattered bruising noted t/o    Areas assessed for pressure by both nurses:   [x]   Head, Face, and Ears   [x]   Shoulders, Back, and Chest, Abdomen  [x]   Arms, Elbows, and Hands   [x]   Coccyx, Sacrum, and Ischium  [x]   Legs, Feet, and Heels        Skin Assessed Under all Medical Devices by both nurses:                All Mepilex Borders were peeled back and area peeked at by both nurses:    Please list where Mepilex Borders are located             SHARE this note so that the co-signing nurse is able to place an eSignature    Co-signer eSignature: Electronically signed by Rima Escobar RN on 1/31/23 at 2:04 AM EST    Does the Patient have Skin Breakdown related to pressure?                Jayro Prevention initiated:  No   Wound Care Orders initiated:  No      WOC nurse consulted for Pressure Injury (Stage 3,4, Unstageable, DTI, NWPT, Complex wounds)and New or Established Ostomies:  No      Primary Nurse eSignature: Electronically signed by Vandana Brown RN on 1/31/23 at 1:42 AM EST

## 2023-01-31 NOTE — ANESTHESIA PRE PROCEDURE
Department of Anesthesiology  Preprocedure Note       Name:  Paulino Solis   Age:  76 y.o.  :  1945                                          MRN:  [id number]         Date:  2023      Surgeon: Ghanshyam Gonzales):  Faye Pallas, MD    Procedure: Procedure(s):  IRRIGATION AND DEBRIDEMENT RIGHT WRIST OPEN REDUCTION INTERNAL FIXATION    Medications prior to admission:   Prior to Admission medications    Medication Sig Start Date End Date Taking? Authorizing Provider   carbidopa-levodopa (SINEMET)  MG per tablet Take 1 tablet by mouth 3 times daily   Yes Historical Provider, MD   HYDROcodone-acetaminophen (NORCO) 5-325 MG per tablet Take 1 tablet by mouth every 6 hours as needed for Pain. Yes Historical Provider, MD   levothyroxine (SYNTHROID) 125 MCG tablet Take 125 mcg by mouth Daily   Yes Historical Provider, MD   carbidopa-levodopa (SINEMET)  MG per tablet TAKE ONE TABLET BY MOUTH 8 TIMES DAY  Patient not taking: Reported on 2023   Kaley Deluca MD   celecoxib (CELEBREX) 200 MG capsule Take 1 capsule by mouth 2 times daily 22   Lefty Sullivan MD   traZODone (DESYREL) 50 MG tablet TAKE 2 TABLETS BY MOUTH NIGHTLY 22  AKBAR Segura CNP   simvastatin (ZOCOR) 40 MG tablet TAKE ONE TABLET BY MOUTH NIGHTLY 22   AKBAR Segura CNP   rOPINIRole (REQUIP) 1 MG tablet Take 1 tablet by mouth 3 times daily  Patient taking differently: Take 1 mg by mouth nightly 22   AKBAR Villeda CNP   amLODIPine (NORVASC) 10 MG tablet TAKE ONE TABLET BY MOUTH EVERY DAY 22   Lefty Sullivan MD   Misc.  Devices (ROLLATOR ULTRA-LIGHT) MISC 1 each by Does not apply route daily 3/21/22   Lefty Sullivan MD   ARIPiprazole (ABILIFY) 5 MG tablet  21   Historical Provider, MD   DULoxetine (CYMBALTA) 60 MG extended release capsule 120 mg 2 cap daily 21   Historical Provider, MD   methylphenidate (RITALIN) 10 MG tablet  21   Historical Provider, MD   omeprazole (PRILOSEC) 20 MG capsule Take 20 mg by mouth daily.  Indications: take dos    Historical Provider, MD       Current medications:    Current Facility-Administered Medications   Medication Dose Route Frequency Provider Last Rate Last Admin    amLODIPine (NORVASC) tablet 10 mg  1 tablet Oral Daily AKBRA England CNP        ARIPiprazole (ABILIFY) tablet 5 mg  5 mg Oral Daily AKBAR [place] CNP        carbidopa-levodopa (SINEMET)  MG per tablet 1 tablet  1 tablet Oral TID AKBAR [place] CNP   1 tablet at 01/31/23 0848    celecoxib (CELEBREX) capsule 200 mg  200 mg Oral BID AKBAR England CNP        DULoxetine (CYMBALTA) extended release capsule 120 mg  120 mg Oral Daily AKBAR England CNP        HYDROcodone-acetaminophen (NORCO) 5-325 MG per tablet 1 tablet  1 tablet Oral Q6H PRN AKBAR England CNP        levothyroxine (SYNTHROID) tablet 112 mcg  112 mcg Oral Daily AKBAR [place] CNP   112 mcg at 01/31/23 0713    pantoprazole (PROTONIX) tablet 40 mg  40 mg Oral Daily AKBAR England CNP        rOPINIRole (REQUIP) tablet 1 mg  1 mg Oral TID AKBAR England CNP   1 mg at 01/31/23 0847    traZODone (DESYREL) tablet 100 mg  100 mg Oral Nightly AKBAR England CNP        atorvastatin (LIPITOR) tablet 40 mg  40 mg Oral Daily AKBAR England CNP        sodium chloride flush 0.9 % injection 5-40 mL  5-40 mL IntraVENous 2 times per day AKBAR England CNP   10 mL at 01/31/23 0848    sodium chloride flush 0.9 % injection 5-40 mL  5-40 mL IntraVENous PRN AKBAR [place] CNP        0.9 % sodium chloride infusion   IntraVENous PRN AKBAR [place] CNP        enoxaparin (LOVENOX) injection 40 mg  40 mg SubCUTAneous Daily AKBAR [place] CNP        ondansetron (ZOFRAN-ODT) disintegrating tablet 4 mg  4 mg Oral Q8H PRN Michiel Alosa, APRN - CNP        Or    ondansetron (ZOFRAN) injection 4 mg  4 mg IntraVENous Q6H PRN Jeevan Ingrid, APRN - CNP        polyethylene glycol (GLYCOLAX) packet 17 g  17 g Oral Daily PRN Jeevan Ingrid, APRN - CNP        acetaminophen (TYLENOL) tablet 650 mg  650 mg Oral Q6H PRN Jeevan Ingrid, APRN - CNP   650 mg at 01/31/23 9685    Or    acetaminophen (TYLENOL) suppository 650 mg  650 mg Rectal Q6H PRN Jeevan Ingrid, APRN - CNP        ceFAZolin (ANCEF) 2000 mg in 0.9% sodium chloride 100 mL IVPB  2,000 mg IntraVENous Q8H Kam Clinton MD   Stopped at 01/31/23 1032    ceFAZolin (ANCEF) 2000 mg in 0.9% sodium chloride 100 mL IVPB  2,000 mg IntraVENous On Call to [address] [place] [address]           Allergies:  No Known Allergies    Problem List:    Patient Active Problem List   Diagnosis Code    PD (Parkinson's disease) ([facility] [address] G20    Dementia due to Parkinson's disease without behavioral disturbance ([address] G20, F02.80    HTN (hypertension), benign I10    Dysthymia F34.1    Primary insomnia F51.01    Restless legs syndrome (RLS) G25.81    Remote history of stroke Z86.73    Urinary incontinence R32    Thyroid disease E07.9    COPD, moderate ([address] J44.9    Mild major depression, single episode ([address] F32.0    Dyslipidemia E78.5    Muscle weakness M62.81    Chronic renal disease, stage III ([address] [849621] N18.30    Right wrist fracture S62.101A       Past Medical History:        Diagnosis Date    Blood transfusion reaction 2003    Cancer [facility])     breast cancer    History of blood transfusion     Hypertension     Thyroid disease     Unspecified cerebral artery occlusion with cerebral infarction        Past Surgical History:        Procedure Laterality Date    BREAST SURGERY      lumpectomy    COLONOSCOPY  11/05/2015 2003    COLONOSCOPY  11/05/2015    HIP FRACTURE SURGERY Right 2022    THORACOTOMY  01/01/1984    benign tumor removal    UPPER GASTROINTESTINAL ENDOSCOPY  11/05/2015       Social History:    Social History     Tobacco Use    Smoking status: Former     Packs/day: 1.00 Years: 10.00     Pack years: 10.00     Types: Cigarettes     Quit date: 1970     Years since quittin.1    Smokeless tobacco: Never    Tobacco comments:     Quit 40 years ago   Substance Use Topics    Alcohol use: Yes     Alcohol/week: 1.0 standard drink     Types: 1 Cans of beer per week     Comment: 1 beer daily                                Counseling given: Not Answered  Tobacco comments: Quit 40 years ago      Vital Signs (Current):   Vitals:    23 0100 23 0113 23 0845   BP: (!) 142/78  138/78   Pulse: 94  87   Resp: 15  16   Temp: 97.8 °F (36.6 °C)  98.7 °F (37.1 °C)   TempSrc: Oral  Oral   SpO2: 97%  91%   Weight:  136 lb (61.7 kg)    Height:  5' 3\" (1.6 m)                                               BP Readings from Last 3 Encounters:   23 138/78   23 117/77   22 136/80       NPO Status: Time of last liquid consumption: 1800                        Time of last solid consumption: 1800                        Date of last liquid consumption: 23                        Date of last solid food consumption: 23    BMI:   Wt Readings from Last 3 Encounters:   23 136 lb (61.7 kg)   23 136 lb (61.7 kg)   22 136 lb 12.8 oz (62.1 kg)     Body mass index is 24.09 kg/m².     CBC:   Lab Results   Component Value Date/Time    WBC 8.5 2023 06:01 AM    RBC 3.38 2023 06:01 AM    HGB 11.0 2023 06:01 AM    HCT 32.6 2023 06:01 AM    MCV 96.5 2023 06:01 AM    RDW 16.4 2023 06:01 AM     2023 06:01 AM       CMP:   Lab Results   Component Value Date/Time     2023 06:02 AM    K 4.3 2023 06:02 AM     2023 06:02 AM    CO2 25 2023 06:02 AM    BUN 14 2023 06:02 AM    CREATININE 0.9 2023 06:02 AM    GFRAA 58 2021 02:09 PM    AGRATIO 1.5 2023 04:00 PM    LABGLOM >60 2023 06:02 AM    GLUCOSE 83 2023 06:02 AM    PROT 7.4 2023 04:00 PM CALCIUM 8.9 01/31/2023 06:02 AM    BILITOT 0.7 01/30/2023 04:00 PM    ALKPHOS 147 01/30/2023 04:00 PM    AST 22 01/30/2023 04:00 PM    ALT 8 01/30/2023 04:00 PM       POC Tests:   Recent Labs     01/30/23  1600   POCGLU 142*       Coags: No results found for: PROTIME, INR, APTT    HCG (If Applicable): No results found for: PREGTESTUR, PREGSERUM, HCG, HCGQUANT     ABGs: No results found for: PHART, PO2ART, JEI0CNX, YLE8BPG, BEART, Z8WWPKVD     Type & Screen (If Applicable):  No results found for: LABABO, LABRH    Drug/Infectious Status (If Applicable):  No results found for: HIV, HEPCAB    COVID-19 Screening (If Applicable):   Lab Results   Component Value Date/Time    COVID19 NEGATIVE 01/30/2023 04:11 PM           Anesthesia Evaluation   no history of anesthetic complications:   Airway: Mallampati: III  TM distance: <3 FB   Neck ROM: limited  Mouth opening: > = 3 FB   Dental:    (+) upper dentures and lower dentures      Pulmonary:   (+) COPD:                             Cardiovascular:    (+) hypertension:, hyperlipidemia                  Neuro/Psych:   (+) CVA:, psychiatric history:depression/anxiety             GI/Hepatic/Renal:   (+) renal disease: CRI,           Endo/Other: Negative Endo/Other ROS                    Abdominal:             Vascular: negative vascular ROS. Other Findings:           Anesthesia Plan      general     ASA 3     (Risks, benefits and alternatives of GA discussed with pt. Questions answered. Willing to proceed.)  Induction: intravenous. Anesthetic plan and risks discussed with patient.                         Yuridia Torres MD   1/31/2023

## 2023-01-31 NOTE — CARE COORDINATION
CASE MANAGEMENT INITIAL ASSESSMENT    Reviewed chart and completed assessment with patient spouse via telephone d/t patient being asleep and h/o Parkinson's dementia    Family present:    Explained Case Management role/services. Primary contact information: spouse    Health Care Decision Maker :   Primary Decision Maker: Irene Vang Spouse - [phone]    Secondary Decision Maker: Misa Jauregui Child - [phone]        Admit date/status: IPA 1/31/23  Diagnosis: wrist fracture     Is this a Readmission?:  No    Readmission Screening completed?: No     Insurance:Medicare     Precert required for SNF: No       3 night stay required: No    Living arrangements, Adls, care needs, prior to admission: resides w/spouse in single story home. Needs assistance with all adls d/t parkinsons. Spouse assists with all, along with private duty aides 1x/week for bathing    Durable Medical Equipment at home:  lift chair, rollator, wheelchair    Services in the home and/or outpatient, prior to admission: [address] for PT/OT 1x/week. Also has private duty aides 1x/week for bathing    Current 17 Aris Can MD           Medications: Prescription coverage? Yes Will pt require financial assistance with medications No     Transportation needs: TBD     PT/OT recs: TBD post op    Hospital Exemption Notification (HEN): not started    Barriers to discharge: none    Plan/comments: spouse intends for patient to return home with resumption of home care services. However, if therapy or provider recommends SNF, spouse prefers [facility]. Declines SNF list. States pt has been to facility multiple times. ECOC on chart for MD signature. CM team will follow post op for provider and therapy recs. No precert needed, should SNF be recommended.     Keren Allison RN

## 2023-01-31 NOTE — TELEPHONE ENCOUNTER
Called and discussed with spouse who verbalized understanding. Spouse states that pt has been seen at [facility] before and gets along with them real well, and due to his limitations, does not feel he could give her the care she deserves.      Spouse states he has spoke to the surgeon and the staff and gave him their requests and is hoping they help him out, and will call pcp prn

## 2023-01-31 NOTE — OP NOTE
Operative Note      Patient: Claudia Ellis  YOB: 1945  MRN: [id number]    Date of Procedure: 1/31/2023    Pre-Op Diagnosis: RIGHT OPEN WRIST FRACTURE    Post-Op Diagnosis: Same       Procedure(s):  IRRIGATION AND DEBRIDEMENT RIGHT WRIST, OPEN REDUCTION INTERNAL FIXATION RIGHT DISTAL RADIUS FRACTURE    Surgeon(s):  Subhash Cleveland MD    Assistant:   Surgical Assistant: Rashawn Beach    Anesthesia: General    Estimated Blood Loss (mL): less than 50     Complications: None    Specimens:   * No specimens in log *    Implants:  Implant Name Type Inv. Item Serial No.  Lot No. LRB No. Used Action   SCREW BNE LCK 2.4X17 MM T7 DRVR STRL EVOS - [id number]  SCREW BNE LCK 2.4X17 MM T7 DRVR STRL EVOS  [facility]  Right 1 Implanted   WIRE ORTH GUID TRCR TIP [id number] - [id number]  WIRE ORTH GUID TRCR TIP [id number]  [facility]  Right 2 Implanted   EVOS DISTAL RADIUS VOLAR PLATE 4H RIGHT STANDARD STAINLESS      Right 1 Implanted   SCREW BONE L15MM DIA2. 4MM HD DIA3. 6MM COR DIA1. 6MM PITCH 1MM - [id number]  SCREW BONE L15MM DIA2. 4MM HD DIA3. 6MM COR DIA1. 6MM PITCH 1MM  [facility]  Right 2 Implanted   SCREW BNE LCK 2.4X18 MM T7 DRVR STRL EVOS - [id number]  SCREW BNE LCK 2.4X18 MM T7 DRVR STRL EVOS  [facility]  Right 1 Implanted   SCREW BONE L11MM THRD DIA2. 4MM HD DIA3. 8MM COR DIA1. 6MM - [id number]  SCREW BONE L11MM THRD DIA2. 4MM HD DIA3. 8MM COR DIA1. 6MM  [facility]  Right 2 Implanted   SCREW BONE L12MM THRD DIA2. 4MM HD DIA3. 8MM COR DIA1. 6MM - [id number]  SCREW BONE L12MM THRD DIA2. 4MM HD DIA3. 8MM COR DIA1. 6MM  [facility]  Right 1 Implanted         Drains:   External Urinary Catheter (Active)   Site Assessment Clean,dry & intact 01/31/23 1732   Placement Initiated 01/31/23 1732   Securement Method Securing device (Describe) 01/31/23 1732   Catheter Care Suction Canister/Tubing changed 01/31/23 1732   Perineal Care Yes 01/31/23 1732   Suction 40 mmgHg continuous 01/31/23 1732   Urine Color Yellow 01/31/23 1732   Urine Appearance Clear 01/31/23 1732   Output (mL) 500 mL 01/31/23 1732     Tourniquet time: 53 min @ 250mmHg. Findings: widely volarly displaced distal radius fracture, open wound over dorsal distal ulna. Detailed Description of Procedure: The patient was positively identified in the preoperative holding area by attending surgeon. Informed consent was verified and placed on the chart. The right upper extremity was marked appropriately. She received IV Ancef and this was redosed as appropriate. She was taken the operating room by the anesthesia team.  General anesthesia was induced. She was laid supine with all bony prominences well-padded. A nonsterile tourniquet was placed high on the right arm but not yet inflated. The limb was examined. There is a small wound over the dorsal distal ulna. There is no active drainage or bleeding. At this point, the limb was sterilely prepped and draped. Next, a timeout was held to verify the correct patient, operative site, laterality, and procedure. Everyone in the room was in agreement. The limb is exsanguinated via an Esmarch and the tourniquet was inflated. The procedure began by extending the dorsal distal ulna incision 1 cm in each direction. The wound was then copiously irrigated with sterile saline solution. This was then closed with 3-0 nylon in a simple interrupted fashion loosely. At this point, standard volar FCR approach to the distal radius was utilized. Dissection was carried sharply through the skin. Blunt dissection was carried down onto the FCR tendon sheath. This was split longitudinally in line tendon sheath. The tendon was retracted ulnarly to protect the median nerve. The deep aspect of the sheath was incised. The FPL was swept ulnarly as well.   This allowed us to identify the pronator quadratus. This was incised on its radial border with a scalpel. An elevator was used to sweep the pronator quadratus ulnarly to expose the shaft of the radius. Next, the fracture was reduced with longitudinal traction. A scalpel was used to release the brachioradialis sharply to allow for reduction. At this point, the 4-hole standard with distal radius volar locking plate was selected from the set. It was provisionally fixated on the distal radius and the expected position along the watershed line. C-arm fluoroscopy demonstrated appropriate overall alignment. This was fixated with 2 locking screws using the nominal screws guide angle. The plate was then fixated proximally in the oblong hole to allow for fine-tuning of the length and alignment. Repeat C arm images demonstrated significant flexion of the distal fragment with appropriate preserved planning. At this point, the distal row of screws were removed and the fracture was reduced with less extension. Screws were then drilled and reinserted into the subchondral bone of the appropriate length. C-arm imaging demonstrated much improved alignment on lateral view with less flexion at the fracture site. The plate was fine-tuned by moving it slightly distal prior to screw insertion. Being satisfied with this additional locking screws were placed including the radial styloid screw which was drilled using the variable angle guide under fluoroscopy. Overall restoration of radial height, volar tilt, radial inclination was established. 2 additional bicortical screws were placed in the shaft to complete the fixation. Final C arm images were obtained. The wound was then copiously irrigated with sterile saline solution and closed in layered fashion using 3-0 Vicryl and 3-0 nylon in an interrupted fashion. The wound was anesthetized with Marcaine half percent with epinephrine for postoperative pain control. The tourniquet was let down.   A sterile dressing as well as a short arm volar resting splint was applied. The patient was then awakened from general anesthesia. She was transitioned back to the hospital bed and taken to the postoperative recovery area in apparent stable condition. There were no immediate complications. All sponge and needle counts are correct.     Electronically signed by Faye Pallas, MD on 1/31/2023 at 6:09 PM

## 2023-01-31 NOTE — BRIEF OP NOTE
Brief Postoperative Note      Patient: Sahra Merino  YOB: 1945  MRN: [id number]    Date of Procedure: 1/31/2023    Pre-Op Diagnosis: RIGHT OPEN WRIST FRACTURE    Post-Op Diagnosis: Same       Procedure(s):  IRRIGATION AND DEBRIDEMENT RIGHT WRIST OPEN REDUCTION INTERNAL FIXATION    Surgeon(s):  Brisa Doe MD    Assistant:  Surgical Assistant: Leroy Caldwell    Anesthesia: General    Estimated Blood Loss (mL): less than 50     Complications: None    Specimens:   * No specimens in log *    Implants:  Implant Name Type Inv. Item Serial No.  Lot No. LRB No. Used Action   SCREW BNE LCK 2.4X17 MM T7 DRVR STRL EVOS - [id number]  SCREW BNE LCK 2.4X17 MM T7 DRVR STRL EVOS  [facility]  Right 1 Implanted   WIRE ORTH GUID TRCR TIP [id number] - [id number]  WIRE ORTH GUID TRCR TIP [id number]  [facility]  Right 2 Implanted   EVOS DISTAL RADIUS VOLAR PLATE 4H RIGHT STANDARD STAINLESS      Right 1 Implanted   SCREW BONE L15MM DIA2. 4MM HD DIA3. 6MM COR DIA1. 6MM PITCH 1MM - [id number]  SCREW BONE L15MM DIA2. 4MM HD DIA3. 6MM COR DIA1. 6MM PITCH 1MM  [facility]  Right 2 Implanted   SCREW BNE LCK 2.4X18 MM T7 DRVR STRL EVOS - [id number]  SCREW BNE LCK 2.4X18 MM T7 DRVR STRL EVOS  [facility]  Right 1 Implanted   SCREW BONE L11MM THRD DIA2. 4MM HD DIA3. 8MM COR DIA1. 6MM - [id number]  SCREW BONE L11MM THRD DIA2. 4MM HD DIA3. 8MM COR DIA1. 6MM  [facility]  Right 2 Implanted   SCREW BONE L12MM THRD DIA2. 4MM HD DIA3. 8MM COR DIA1. 6MM - [id number]  SCREW BONE L12MM THRD DIA2. 4MM HD DIA3. 8MM COR DIA1. 6MM  [facility]  Right 1 Implanted         Drains:   External Urinary Catheter (Active)   Site Assessment Clean,dry & intact 01/31/23 0100   Placement Initiated 01/31/23 0100   Catheter Care Suction Canister/Tubing changed 01/31/23 0100   Perineal Care Yes 01/31/23 0100   Suction 40 mmgHg continuous 01/31/23 0100   Urine Color Yellow 01/31/23 0100   Urine Appearance Cloudy 01/31/23 0100       Findings:  displaced volarly displaced distal radius fracture.     Electronically signed by Faye Pallas, MD on 1/31/2023 at 2:49 PM

## 2023-01-31 NOTE — DISCHARGE INSTR - COC
Continuity of Care Form    Patient Name: Analisa Evans   :  1945  MRN:  [id number]    Admit date:  2023  Discharge date:  23    Code Status Order: Full Code   Advance Directives:     Admitting Physician:  Leatha Chaudhary MD  PCP: Haider Estrella MD    Discharging Nurse: [facility] Unit/Room#: 8550/4151-26  Discharging Unit Phone Number: [phone]    Emergency Contact:   Extended Emergency Contact Information  Primary Emergency Contact: Geovany Ma  Address: [address] Phone: [phone]  Relation: Spouse   needed? No  Secondary Emergency Contact: [facility] Phone: [phone]  Mobile Phone: [phone]  Relation: Child   needed?  No    Past Surgical History:  Past Surgical History:   Procedure Laterality Date    BREAST SURGERY      lumpectomy    COLONOSCOPY  2015    COLONOSCOPY  2015    HIP FRACTURE SURGERY Right     THORACOTOMY  1984    benign tumor removal    UPPER GASTROINTESTINAL ENDOSCOPY  2015       Immunization History:   Immunization History   Administered Date(s) Administered    COVID-19, MODERNA BLUE border, Primary or Immunocompromised, (age 12y+), IM, 100 mcg/0.5mL 2021    COVID-19, PFIZER GRAY top, DO NOT Dilute, (age 15 y+), IM, 30 mcg/0.3 mL 2022    COVID-19, PFIZER PURPLE top, DILUTE for use, (age 15 y+), 30mcg/0.3mL 2021, 2021, 10/06/2021    Influenza, FLUAD, (age 72 y+), Adjuvanted, 0.5mL 10/15/2020    Pneumococcal Polysaccharide ([id number]) 2008    Td, unspecified formulation 2008       Active Problems:  Patient Active Problem List   Diagnosis Code    PD (Parkinson's disease) ([facility] [address] G20    Dementia due to Parkinson's disease without behavioral disturbance ([facility] [address] G20, F02.80    HTN (hypertension), benign I10    Dysthymia F34.1    Primary insomnia F51.01    Restless legs syndrome (RLS) G25.81    Remote history of stroke Z86.73    Urinary incontinence R32    Thyroid disease E07.9    COPD, moderate (HCC) J44.9    Mild major depression, single episode (HCC) F32.0    Dyslipidemia E78.5    Muscle weakness M62.81    Chronic renal disease, stage III [facility]) [[id number]] N18.30    Right wrist fracture S62.101A       Isolation/Infection:   Isolation            No Isolation          Patient Infection Status       None to display            Nurse Assessment:  Last Vital Signs: /78   Pulse 87   Temp 98.7 °F (37.1 °C) (Oral)   Resp 16   Ht 5' 3\" (1.6 m)   Wt 136 lb (61.7 kg)   SpO2 91%   BMI 24.09 kg/m²     Last documented pain score (0-10 scale): Pain Level: 3  Last Weight:   Wt Readings from Last 1 Encounters:   01/31/23 136 lb (61.7 kg)     Mental Status:  oriented and alert    IV Access:  - None    Nursing Mobility/ADLs:  Walking   Assisted  Transfer  Assisted  Bathing  Assisted  Dressing  Assisted  Toileting  Assisted  Feeding  Independent  Med Admin  Assisted  Med Delivery   crushed and prefers mixed with applesauce    Wound Care Documentation and Therapy:  Incision 05/14/14 Foot Right (Active)   Number of days: 3183        Elimination:  Continence: Bowel: Yes  Bladder: Yes  Urinary Catheter:  external catheter    Colostomy/Ileostomy/Ileal Conduit: No       Date of Last BM: 2/1/23  No intake or output data in the 24 hours ending 01/31/23 1026  No intake/output data recorded. Safety Concerns: At Risk for Falls    Impairments/Disabilities:      Injury/Surgery to right wrist    Nutrition Therapy:  Current Nutrition Therapy:   - Oral Diet:  General    Routes of Feeding: Oral  Liquids: Thin Liquids  Daily Fluid Restriction: no  Last Modified Barium Swallow with Video (Video Swallowing Test): not done    Treatments at the Time of Hospital Discharge:   Respiratory Treatments: ***  Oxygen Therapy:  is not on home oxygen therapy.   Ventilator:    - No ventilator support    Rehab Therapies: Physical Therapy and Occupational Therapy  Weight Bearing Status/Restrictions: No weight bearing restrictions  Other Medical Equipment (for information only, NOT a DME order):  stedy  Other Treatments:     Patient's personal belongings (please select all that are sent with patient):       RN SIGNATURE:  Electronically signed by Elma Jalloh RN on 2/2/23 at 12:20 PM EST    CASE MANAGEMENT/SOCIAL WORK SECTION    Inpatient Status Date: 01/31/23     Readmission Risk Assessment Score:  Readmission Risk              Risk of Unplanned Readmission:  8         Discharging to Facility/ Agency   Name: [facility]  Address:  Phone: [phone], [address] [facility]  Fax:    / signature: Electronically signed by Mc Forbes RN on 2/2/23 at 10:43 AM EST    PHYSICIAN SECTION    Prognosis: Good    Condition at Discharge: Stable    Rehab Potential (if transferring to Rehab): Good    Recommended Follow-up, Labs or Other Treatments After Discharge:    PT/OT- Nonweightbearing to the right wrist.  Okay for platform walker   Maintain right wrist in splint at all times. Keep splint clean, dry, intact until follow-up appointment  Work on finger range of motion  Elevate right arm to help reduce swelling  Ice as needed to the right wrist for pain and swelling control  Follow-up with Dr. Rudy Dong in 1 week. Call [facility] orthopedics at [phone] to schedule appointment or with any questions               Physician Certification: I certify the above information and transfer of Zaria Keller  is necessary for the continuing treatment of the diagnosis listed and that she requires [place] for less 30 days.      Update Admission H&P: No change in H&P    PHYSICIAN SIGNATURE:  Electronically signed by Bobo Peterson MD on 2/2/23 at 9:14 AM EST

## 2023-01-31 NOTE — PLAN OF CARE
Bed in lowest position, wheels locked, 2/4 side rails up, nonskid footwear on. Bed/ chair check alarm in place, call light within reach. Pt instructed to call out when needing assistance. Pt stated understanding. Nurse will continue to monitor.     Problem: Safety - Adult  Goal: Free from fall injury  Outcome: Progressing

## 2023-02-01 LAB
A/G RATIO: 1.9 (ref 1.1–2.2)
ALBUMIN SERPL-MCNC: 3.9 G/DL (ref 3.4–5)
ALP BLD-CCNC: 112 U/L (ref 40–129)
ALT SERPL-CCNC: <5 U/L (ref 10–40)
ANION GAP SERPL CALCULATED.3IONS-SCNC: 13 MMOL/L (ref 3–16)
AST SERPL-CCNC: 11 U/L (ref 15–37)
BASOPHILS ABSOLUTE: 0 K/UL (ref 0–0.2)
BASOPHILS RELATIVE PERCENT: 0.1 %
BILIRUB SERPL-MCNC: 0.3 MG/DL (ref 0–1)
BUN BLDV-MCNC: 11 MG/DL (ref 7–20)
CALCIUM SERPL-MCNC: 9.1 MG/DL (ref 8.3–10.6)
CHLORIDE BLD-SCNC: 104 MMOL/L (ref 99–110)
CO2: 24 MMOL/L (ref 21–32)
CREAT SERPL-MCNC: 0.9 MG/DL (ref 0.6–1.2)
EOSINOPHILS ABSOLUTE: 0 K/UL (ref 0–0.6)
EOSINOPHILS RELATIVE PERCENT: 0 %
GFR SERPL CREATININE-BSD FRML MDRD: >60 ML/MIN/{1.73_M2}
GLUCOSE BLD-MCNC: 99 MG/DL (ref 70–99)
HCT VFR BLD CALC: 33.6 % (ref 36–48)
HEMOGLOBIN: 10.7 G/DL (ref 12–16)
LYMPHOCYTES ABSOLUTE: 0.9 K/UL (ref 1–5.1)
LYMPHOCYTES RELATIVE PERCENT: 10.5 %
MCH RBC QN AUTO: 30.8 PG (ref 26–34)
MCHC RBC AUTO-ENTMCNC: 31.8 G/DL (ref 31–36)
MCV RBC AUTO: 96.9 FL (ref 80–100)
MONOCYTES ABSOLUTE: 0.5 K/UL (ref 0–1.3)
MONOCYTES RELATIVE PERCENT: 5.9 %
NEUTROPHILS ABSOLUTE: 7.3 K/UL (ref 1.7–7.7)
NEUTROPHILS RELATIVE PERCENT: 83.5 %
PDW BLD-RTO: 16.8 % (ref 12.4–15.4)
PLATELET # BLD: 248 K/UL (ref 135–450)
PMV BLD AUTO: 8.3 FL (ref 5–10.5)
POTASSIUM SERPL-SCNC: 4.6 MMOL/L (ref 3.5–5.1)
RBC # BLD: 3.47 M/UL (ref 4–5.2)
SODIUM BLD-SCNC: 141 MMOL/L (ref 136–145)
TOTAL PROTEIN: 6 G/DL (ref 6.4–8.2)
WBC # BLD: 8.7 K/UL (ref 4–11)

## 2023-02-01 PROCEDURE — 97535 SELF CARE MNGMENT TRAINING: CPT

## 2023-02-01 PROCEDURE — 97530 THERAPEUTIC ACTIVITIES: CPT

## 2023-02-01 PROCEDURE — APPNB60 APP NON BILLABLE TIME 46-60 MINS: Performed by: SPECIALIST/TECHNOLOGIST

## 2023-02-01 PROCEDURE — 36415 COLL VENOUS BLD VENIPUNCTURE: CPT

## 2023-02-01 PROCEDURE — 97167 OT EVAL HIGH COMPLEX 60 MIN: CPT

## 2023-02-01 PROCEDURE — 6370000000 HC RX 637 (ALT 250 FOR IP): Performed by: ORTHOPAEDIC SURGERY

## 2023-02-01 PROCEDURE — 99024 POSTOP FOLLOW-UP VISIT: CPT | Performed by: SPECIALIST/TECHNOLOGIST

## 2023-02-01 PROCEDURE — 80053 COMPREHEN METABOLIC PANEL: CPT

## 2023-02-01 PROCEDURE — 85025 COMPLETE CBC W/AUTO DIFF WBC: CPT

## 2023-02-01 PROCEDURE — 2580000003 HC RX 258: Performed by: ORTHOPAEDIC SURGERY

## 2023-02-01 PROCEDURE — 6370000000 HC RX 637 (ALT 250 FOR IP): Performed by: SPECIALIST/TECHNOLOGIST

## 2023-02-01 PROCEDURE — 97162 PT EVAL MOD COMPLEX 30 MIN: CPT

## 2023-02-01 PROCEDURE — 6360000002 HC RX W HCPCS: Performed by: ORTHOPAEDIC SURGERY

## 2023-02-01 PROCEDURE — 1200000000 HC SEMI PRIVATE

## 2023-02-01 RX ADMIN — AMLODIPINE BESYLATE 10 MG: 5 TABLET ORAL at 09:28

## 2023-02-01 RX ADMIN — CARBIDOPA AND LEVODOPA 1 TABLET: 25; 100 TABLET ORAL at 13:45

## 2023-02-01 RX ADMIN — ACETAMINOPHEN 325MG 650 MG: 325 TABLET ORAL at 17:11

## 2023-02-01 RX ADMIN — ACETAMINOPHEN 325MG 650 MG: 325 TABLET ORAL at 11:44

## 2023-02-01 RX ADMIN — ACETAMINOPHEN 325MG 650 MG: 325 TABLET ORAL at 05:36

## 2023-02-01 RX ADMIN — TRAZODONE HYDROCHLORIDE 100 MG: 50 TABLET ORAL at 21:14

## 2023-02-01 RX ADMIN — ROPINIROLE HYDROCHLORIDE 1 MG: 0.5 TABLET, FILM COATED ORAL at 21:14

## 2023-02-01 RX ADMIN — ENOXAPARIN SODIUM 40 MG: 100 INJECTION SUBCUTANEOUS at 09:27

## 2023-02-01 RX ADMIN — DULOXETINE HYDROCHLORIDE 120 MG: 60 CAPSULE, DELAYED RELEASE ORAL at 09:27

## 2023-02-01 RX ADMIN — CELECOXIB 200 MG: 100 CAPSULE ORAL at 21:14

## 2023-02-01 RX ADMIN — ATORVASTATIN CALCIUM 40 MG: 40 TABLET, FILM COATED ORAL at 09:28

## 2023-02-01 RX ADMIN — CARBIDOPA AND LEVODOPA 1 TABLET: 25; 100 TABLET ORAL at 09:28

## 2023-02-01 RX ADMIN — CELECOXIB 200 MG: 100 CAPSULE ORAL at 09:31

## 2023-02-01 RX ADMIN — Medication 10 ML: at 21:15

## 2023-02-01 RX ADMIN — LEVOTHYROXINE SODIUM 112 MCG: 112 TABLET ORAL at 05:36

## 2023-02-01 RX ADMIN — POLYETHYLENE GLYCOL 3350 17 G: 17 POWDER, FOR SOLUTION ORAL at 09:27

## 2023-02-01 RX ADMIN — ARIPIPRAZOLE 5 MG: 10 TABLET ORAL at 09:28

## 2023-02-01 RX ADMIN — OXYCODONE HYDROCHLORIDE 10 MG: 5 TABLET ORAL at 21:14

## 2023-02-01 RX ADMIN — PANTOPRAZOLE SODIUM 40 MG: 40 TABLET, DELAYED RELEASE ORAL at 09:28

## 2023-02-01 RX ADMIN — ROPINIROLE HYDROCHLORIDE 1 MG: 0.5 TABLET, FILM COATED ORAL at 13:45

## 2023-02-01 RX ADMIN — Medication 2000 MG: at 05:36

## 2023-02-01 RX ADMIN — ROPINIROLE HYDROCHLORIDE 1 MG: 0.5 TABLET, FILM COATED ORAL at 09:30

## 2023-02-01 RX ADMIN — CARBIDOPA AND LEVODOPA 1 TABLET: 25; 100 TABLET ORAL at 21:14

## 2023-02-01 ASSESSMENT — PAIN SCALES - GENERAL
PAINLEVEL_OUTOF10: 0
PAINLEVEL_OUTOF10: 7
PAINLEVEL_OUTOF10: 0

## 2023-02-01 ASSESSMENT — PAIN DESCRIPTION - ORIENTATION: ORIENTATION: RIGHT

## 2023-02-01 ASSESSMENT — PAIN DESCRIPTION - LOCATION: LOCATION: WRIST

## 2023-02-01 NOTE — PROGRESS NOTES
Occupational Therapy  Facility/Department: [facility] C5 - MED SURG/ORTHO  Occupational Therapy Initial Assessment and Treatment Note    Name: Sharona Musa  : 1945  MRN: [id number]  Date of Service: 2023    Discharge Recommendations:  [address]      Patient Diagnosis(es): There were no encounter diagnoses. Past Medical History:  has a past medical history of Blood transfusion reaction, Cancer ([facility] [address] History of blood transfusion, Hypertension, Thyroid disease, and Unspecified cerebral artery occlusion with cerebral infarction. Past Surgical History:  has a past surgical history that includes Breast surgery; thoracotomy (1984); Colonoscopy (2015); Colonoscopy (2015); Upper gastrointestinal endoscopy (2015); and Hip fracture surgery (Right, ). Assessment   Performance deficits / Impairments: Decreased functional mobility ; Decreased ADL status; Decreased cognition;Decreased balance;Decreased endurance;Decreased coordination;Decreased ROM; Decreased strength;Decreased safe awareness    Assessment: OT eval and tx completed. Pt admitted for R ulna and radius fxs, s/p ORIF and I&D 23. She has hx of Parkinsons disease and receives assist from spouse for majority of BADLs and transfers. Spouse reports that he has a routine for transfers which is now limited by R wrist fx. Due to change in ability to transfer, OT recommends SNF for skilled OT to improve function and reduce burden of care. Cont OT in acute care. Completed co-tx with PT to safely address ADLs and mobility d/t extent of performance deficits.     Prognosis: Fair  Decision Making: High Complexity  REQUIRES OT FOLLOW-UP: Yes  Activity Tolerance  Activity Tolerance: Patient limited by fatigue        Plan   Occupational Therapy Plan  Times Per Week: 3-5x     Restrictions  Restrictions/Precautions  Restrictions/Precautions: Fall Risk, Weight Bearing, Up as Tolerated  Required Braces or Orthoses?: Yes  Upper Extremity Weight Bearing Restrictions  Right Upper Extremity Weight Bearing: Non Weight Bearing  Required Braces or Orthoses  Right Upper Extremity Brace/Splint:  (Splint)    Subjective   General  Chart Reviewed: Yes, Operative Notes, Orders, Progress Notes, History and Physical  Patient assessed for rehabilitation services?: Yes  Additional Pertinent Hx: Parkinsons disease, hx R hip fx 2022  Family / Caregiver Present: Yes (spouse)  Referring Practitioner: Marie Che  Diagnosis: R distal and radius fx s/p I&D, ORIF 1/31/23  Subjective  Subjective: Pt agreeable to OT. Reports no pain. General Comment  Comments: RN approved therapy     Social/Functional History  Social/Functional History  Lives With: Spouse  Type of Home: House  Home Layout: Two level, Able to Live on Main level with bedroom/bathroom  Home Access: Stairs to enter with rails  Entrance Stairs - Number of Steps: 8 VETO  Entrance Stairs - Rails: Both  Bathroom Shower/Tub: Walk-in shower, Shower chair without back  Bathroom Toilet: Handicap height  Bathroom Equipment: Grab bars in shower, Grab bars around toilet  Home Equipment: Rollator, Wheelchair-manual, Lift chair  Has the patient had two or more falls in the past year or any fall with injury in the past year?: Yes (2 falls, hurt her R hip)  ADL Assistance: Needs assistance ( or home aid assists with bathing, dressing, and toiliting. Able to complete her own grooming)  Homemaking Assistance: Needs assistance ( complete all household tasks)  Homemaking Responsibilities: No ( completes)  Ambulation Assistance: Independent (With Rollator)  Transfer Assistance: Needs assistance (Naveed helps \"A lot\" with with all transfers)  Active : No  Patient's  Info:  drives  Occupation: Retired  Type of Occupation: Manager  Leisure & Hobbies: read and watch TV  Additional Comments:  For transfers, pt uses rollator, floor disc for transfers and then spouse braces himself against the pole next to the bed in order to provide full support. Objective   Heart Rate: 84  Heart Rate Source: Monitor  BP: 119/74  BP Location: Left upper arm  BP Method: Automatic  Patient Position: Right side  MAP (Calculated): 89  Resp: 16  SpO2: 94 %  O2 Device: None (Room air)        Observation/Palpation  Posture: Poor  Observation: Increased thoracic kyphosis and forward head posture. Safety Devices  Type of Devices: Patient at risk for falls;Call light within reach; Bed alarm in place; All fall risk precautions in place; Left in bed;Gait belt;Nurse notified; Heels elevated for pressure relief  Restraints  Restraints Initially in Place: No     AROM: Generally decreased, functional (R wrist wrapped in Ace wrap. R fingers flex partially. LUE WFL)  PROM: Generally decreased, functional  Strength: Generally decreased, functional  Coordination: Generally decreased, functional  Tone: Normal  Sensation: Intact  ADL  Feeding: Moderate assistance;Setup;Verbal cueing; Increased time to complete  Feeding Skilled Clinical Factors: Pt is left handed and able to reach for utensils. Mod A needed to complete feeding. Grooming: Maximum assistance  UE Dressing: Maximum assistance  LE Dressing: Dependent/Total  Toileting: Dependent/Total     Activity Tolerance  Activity Tolerance: Patient tolerated evaluation without incident;Patient limited by fatigue  Bed mobility  Supine to Sit: Maximum assistance;2 Person assistance (HOB elevated)  Sit to Supine: Maximum assistance;2 Person assistance  Scooting: Dependent/Total;2 Person assistance (to [facility])  Bed Mobility Comments: Sat EOB ~5 min with SBA/CGA. Pt's posture is forward flexed with R side neck flexion. Transfers  Sit to stand: Dependent/Total;2 Person assistance (Total assist x2 to stand from EOB with strong backward lean)  Stand to sit: Dependent/Total;2 Person assistance  Transfer Comments: Unable to safely transfer today d/t poor balance.   Vision  Vision: Impaired  Vision Exceptions: Wears glasses for distance  Hearing  Hearing: Within functional limits  Cognition  Overall Cognitive Status: Exceptions  Arousal/Alertness: Delayed responses to stimuli  Following Commands: Follows one step commands with increased time  Cognition Comment: Delayed processing and response. Pt responds appropriately to questions but keeps eyes closed. Orientation  Overall Orientation Status: Within Normal Limits  Orientation Level: Oriented X4       Education Given To: Patient; Family  Education Provided: Role of Therapy;Plan of Care;Family Education  Education Method: Verbal  Barriers to Learning: Cognition  Education Outcome: Verbalized understanding   Disease Specific Education: Pt educated on weight bearing status, post-op precautions, appropriate DME, and safe mobility with AD. Pt verbalized understanding     Hand Dominance  Hand Dominance: Left     AM-PAC Score      AM-PAC Inpatient Daily Activity Raw Score: 9 (02/01/23 1359)  AM-PAC Inpatient ADL T-Scale Score : 25.33 (02/01/23 1359)  ADL Inpatient CMS 0-100% Score: 79.59 (02/01/23 1359)  ADL Inpatient CMS G-Code Modifier : CL (02/01/23 [facility]9)    Goals  Short Term Goals  Time Frame for Short Term Goals: 1 week (2/8) unless noted  Short Term Goal 1: Perform functional transfer with Stedy and mod A by 2/6  Short Term Goal 2: Perform 1-2 grooming tasks with SBA  Short Term Goal 3: Feed self with SBA after s/u  Patient Goals   Patient goals : \"Go home today\"     Therapy Time   Individual Concurrent Group Co-treatment   Time In 1137         Time Out 1210         Minutes 33         Timed Code Treatment Minutes: 23 Minutes (10 min eval)   If pt is discharged prior to next OT session, this note will serve as the discharge summary.   Ash Fregoso OT

## 2023-02-01 NOTE — DISCHARGE INSTRUCTIONS
Right wrist fracture  Nonweightbearing to the right wrist.  Okay for platform walker   Maintain right wrist in splint at all times. Keep splint clean, dry, intact  Work on finger range of motion  Elevate right arm to help reduce swelling  Ice as needed to the right wrist for pain and swelling control  Follow-up with Dr. Dewitte Holstein in 1 week.   Call [facility] orthopedics at [phone] to schedule appointment or with any questions

## 2023-02-01 NOTE — PROGRESS NOTES
Hospitalist Progress Note      PCP: Martin Maher MD    Date of Admission: 1/31/2023    Chief Complaint: Right wrist pain    Hospital Course: Patient with Parkinson's disease and poor functional baseline admitted with right wrist pain after fall. Diagnosed with acute fracture and underwent open reduction internal fixation. Presumably patient will need skilled nursing facility, but no decision made as the therapy services have not seen the patient yet. Subjective: Pain is controlled. No chest pain, no shortness of breath, no nausea, no vomiting, no abdominal pain      Medications:  Reviewed    Infusion Medications    sodium chloride       Scheduled Medications    amLODIPine  10 mg Oral Daily    ARIPiprazole  5 mg Oral Daily    carbidopa-levodopa  1 tablet Oral TID    celecoxib  200 mg Oral BID    DULoxetine  120 mg Oral Daily    levothyroxine  112 mcg Oral Daily    pantoprazole  40 mg Oral Daily    rOPINIRole  1 mg Oral TID    traZODone  100 mg Oral Nightly    atorvastatin  40 mg Oral Daily    enoxaparin  40 mg SubCUTAneous Daily    sodium chloride flush  5-40 mL IntraVENous 2 times per day    polyethylene glycol  17 g Oral Daily    acetaminophen  650 mg Oral Q6H     PRN Meds: sodium chloride flush, sodium chloride, ondansetron **OR** ondansetron, oxyCODONE **OR** oxyCODONE      Intake/Output Summary (Last 24 hours) at 2/1/2023 1250  Last data filed at 2/1/2023 1009  Gross per 24 hour   Intake 160 ml   Output 1000 ml   Net -840 ml       Physical Exam Performed:    /74   Pulse 84   Temp 98.2 °F (36.8 °C) (Oral)   Resp 16   Ht 5' 3\" (1.6 m)   Wt 136 lb (61.7 kg)   SpO2 94%   BMI 24.09 kg/m²     General appearance: No apparent distress, appears stated age and cooperative. HEENT: Pupils equal, round, and reactive to light. Conjunctivae/corneas clear. Neck: Supple, with full range of motion. No jugular venous distention. Trachea midline. Respiratory:  Normal respiratory effort.  Clear to auscultation, bilaterally without Rales/Wheezes/Rhonchi. Cardiovascular: Regular rate and rhythm with normal S1/S2 without murmurs, rubs or gallops. Abdomen: Soft, non-tender, non-distended with normal bowel sounds. Musculoskeletal: No clubbing, cyanosis or edema bilaterally. Postop right wrist is clean and dry. Skin: Skin color, texture, turgor normal.  No rashes or lesions. Neurologic:  Neurovascularly intact without any focal sensory/motor deficits. Cranial nerves: II-XII intact, grossly non-focal.  Psychiatric: Sleeping, arousable  Capillary Refill: Brisk, 3 seconds, normal   Peripheral Pulses: +2 palpable, equal bilaterally       Labs:   Recent Labs     01/30/23  1600 01/31/23  0601 02/01/23  0551   WBC 13.8* 8.5 8.7   HGB 12.1 11.0* 10.7*   HCT 38.0 32.6* 33.6*    250 248     Recent Labs     01/30/23  1600 01/31/23  0602 02/01/23  0550    137 141   K 4.1 4.3 4.6    106 104   CO2 23 25 24   BUN 17 14 11   CREATININE 1.0 0.9 0.9   CALCIUM 9.3 8.9 9.1     Recent Labs     01/30/23  1600 02/01/23  0550   AST 22 11*   ALT 8 <5*   BILITOT 0.7 0.3   ALKPHOS 147* 112     No results for input(s): INR in the last 72 hours. Recent Labs     01/30/23  1600   TROPONINI <0.012       Urinalysis:      Lab Results   Component Value Date/Time    BACTERIA MANY 01/30/2023 09:18 PM    BLOODU Large 05/23/2022 01:46 PM    SPECGRAV >=1.030 01/30/2023 09:18 PM    GLUCOSEU NEGATIVE 01/30/2023 09:18 PM       Radiology:  XR WRIST RIGHT (MIN 3 VIEWS)   Final Result   Intraprocedural fluoroscopic spot images as above. See separate procedure   report for more information. FLUORO FOR SURGICAL PROCEDURES   Final Result   Intraprocedural fluoroscopic spot images as above. See separate procedure   report for more information.          XR CHEST PORTABLE   Final Result   No acute disease             None    Assessment/Plan:    Active Hospital Problems    Diagnosis     Right wrist fracture [S62.101A]      Priority: Medium    Dyslipidemia [E78.5]     COPD, moderate ([facility] [address] [J44.9]     Dementia due to Parkinson's disease without behavioral disturbance ([facility] [address] [G20, F02.80]     HTN (hypertension), benign [I10]      PLAN    Right wrist fracture  Underwent surgery. Likely to require skilled nursing facility. Awaiting PT OT    COPD without exacerbation  Patient was able to go through surgery well with no problems. Continue to monitor. Hypothyroidism  Check TSH to evaluate need for dose adjustment. Continue home dose of Synthroid in the meantime. Hypertension  No chest pain. No findings or complaints suspicious for orthostatic hypotension. Continue home medications as before    Parkinson's disease  No active symptoms, though patient does have progressive functional decline. Likely to require skilled nursing facility once qualified with therapy evaluation. Discussed with nursing    DVT Prophylaxis: Lovenox  Diet: ADULT DIET; Regular  Code Status: Full Code  PT/OT Eval Status: Ordered not yet done    Dispo -expect the patient to go to skilled nursing facility. Probably tomorrow as PT/OT quantification not performed yet.     Appropriate for A1 Discharge Unit: No      Bhargavi Mayorga MD

## 2023-02-01 NOTE — PROGRESS NOTES
Department of Orthopedic Surgery  Physician Assistant   Progress Note    Subjective:       Systemic or Specific Complaints:No Complaints related to the wrist, is feeling tired.  No family at bedside    Objective:     Patient Vitals for the past 24 hrs:   BP Temp Temp src Pulse Resp SpO2   02/01/23 0319 (!) 145/80 98.4 °F (36.9 °C) Oral 78 16 93 %   01/31/23 2330 (!) 148/82 98.6 °F (37 °C) Oral 79 18 93 %   01/31/23 1940 139/82 99.5 °F (37.5 °C) Oral (!) 101 16 92 %   01/31/23 1857 (!) 147/81 98.7 °F (37.1 °C) Oral (!) 110 16 91 %   01/31/23 1726 (!) 147/84 98.4 °F (36.9 °C) Oral (!) 105 16 92 %   01/31/23 1701 -- -- -- -- 16 --   01/31/23 1618 132/80 99 °F (37.2 °C) Oral 100 16 92 %   01/31/23 1535 -- -- -- 98 15 94 %   01/31/23 1534 (!) 147/82 -- -- 99 18 95 %   01/31/23 1533 -- -- -- 98 16 95 %   01/31/23 1532 -- -- -- 98 14 95 %   01/31/23 1531 -- -- -- 98 18 93 %   01/31/23 1530 (!) 131/100 -- -- 98 18 93 %   01/31/23 1529 -- -- -- 99 25 92 %   01/31/23 1528 -- -- -- 100 19 (!) 87 %   01/31/23 1527 -- -- -- 98 19 (!) 89 %   01/31/23 1526 -- -- -- 96 17 (!) 88 %   01/31/23 1525 -- -- -- 95 21 (!) 88 %   01/31/23 1524 136/67 -- -- 97 14 (!) 88 %   01/31/23 1523 -- -- -- 96 18 (!) 88 %   01/31/23 1522 -- -- -- 96 18 (!) 88 %   01/31/23 1521 118/73 -- -- 95 18 (!) 89 %   01/31/23 1520 -- -- -- 96 16 90 %   01/31/23 1519 -- -- -- 96 19 (!) 89 %   01/31/23 1518 -- -- -- 97 19 (!) 88 %   01/31/23 1517 -- -- -- 96 22 90 %   01/31/23 1516 -- -- -- 98 15 90 %   01/31/23 1515 -- -- -- 97 21 91 %   01/31/23 1514 (!) 155/111 -- -- 97 28 91 %   01/31/23 1513 -- -- -- 95 27 91 %   01/31/23 1512 -- -- -- 97 23 91 %   01/31/23 1511 -- -- -- 96 19 92 %   01/31/23 1510 138/64 -- -- 98 20 91 %   01/31/23 1509 -- -- -- 94 20 --   01/31/23 1508 -- -- -- 96 23 --   01/31/23 1507 -- -- -- 96 19 --   01/31/23 1506 -- -- -- 94 17 --   01/31/23 1505 133/75 -- -- 96 16 93 %   01/31/23 1504 -- -- -- 96 21 94 %   01/31/23 1503 -- -- -- 94 18 95 %   01/31/23 1500 139/65 97.9 °F (36.6 °C) Oral 94 (!) 7 95 %   01/31/23 0845 138/78 98.7 °F (37.1 °C) Oral 87 16 91 %       General: alert, appears stated age, cooperative, and no distress   Wound: Post op dressing/splint CDI   Motion: immobilized in affected extremity   DVT Exam: No evidence of DVT seen on physical exam.     Additional exam: R fingers swollen, soft and compressible  Ecchymosis to the fingers  Full finger ROM after some encouragement  Ulnar, radial, median nerve motor intact  Sensation intact to light touch  Radial pulse 1+, cap refill approx 3 seconds      Data Review  CBC:   Lab Results   Component Value Date/Time    WBC 8.7 02/01/2023 05:51 AM    RBC 3.47 02/01/2023 05:51 AM    HGB 10.7 02/01/2023 05:51 AM    HCT 33.6 02/01/2023 05:51 AM     02/01/2023 05:51 AM       Renal:   Lab Results   Component Value Date/Time     02/01/2023 05:50 AM    K 4.6 02/01/2023 05:50 AM    K 4.3 01/31/2023 06:02 AM     02/01/2023 05:50 AM    CO2 24 02/01/2023 05:50 AM    BUN 11 02/01/2023 05:50 AM    CREATININE 0.9 02/01/2023 05:50 AM    GLUCOSE 99 02/01/2023 05:50 AM    CALCIUM 9.1 02/01/2023 05:50 AM        EBL less than 50mL    Assessment:     Irrigation and debridement of right wrist, open reduction internal fixation of distal radius open fracture, POD 1. DOS 1/31/23 by Dr Clark Sha:      1:  Nonweightbearing to the right arm. Keep splint clean, dry, intact until follow up in 2 weeks  2:  Continue Deep venous thrombosis prophylaxis- lovenox per primary, OK for ambulation at discharge from ortho standpoint  3:  Continue Pain Control- scheduled tylenol, oxycodone PRN  4: PT/OT eval pending  5: Two doses IV ancef completed post op  6: Discharge pending medical stability, potential need for placement. OK to discharge from ortho standpoint.  DCP updated    Katelin Greerma

## 2023-02-01 NOTE — PROGRESS NOTES
Physical Therapy  Facility/Department: [facility] C5 - MED SURG/ORTHO  Physical Therapy Initial Assessment/Treatment     Name: Nithin Barragan  : 1945  MRN: [id number]  Date of Service: 2023    Discharge Recommendations:  Subacute/Skilled Nursing Facility   PT Equipment Recommendations  Equipment Needed: No  Other: Defer      Patient Diagnosis(es): There were no encounter diagnoses. Past Medical History:  has a past medical history of Blood transfusion reaction, Cancer ([facility] [address] History of blood transfusion, Hypertension, Thyroid disease, and Unspecified cerebral artery occlusion with cerebral infarction. Past Surgical History:  has a past surgical history that includes Breast surgery; thoracotomy (1984); Colonoscopy (2015); Colonoscopy (2015); Upper gastrointestinal endoscopy (2015); and Hip fracture surgery (Right, ). Assessment   Body Structures, Functions, Activity Limitations Requiring Skilled Therapeutic Intervention: Decreased cognition;Decreased functional mobility ; Decreased endurance;Decreased balance;Decreased posture;Decreased strength;Decreased safe awareness;Decreased ROM  Assessment: Pt to [facility] after fall with R wrist fracture, post-op R radial ORIF. PTA pt lives with  in 2 level house with 8 VETO, bed/bathroom on main level. Pt requires assistance from  with transfers and ambulation. pt uses rollator when ambulating and WC PRN at home when fatigued. Pt currently requires max Ax2 for bed mobility and total Ax2 for sit<>stand. Pt limited by fatigued this date, pt with difficulty keeping eyes open throughout session, states she did not sleep well last night. At this time pt is not safe to return home due to high levels of assist required, it is rec pt DC to SNF when deemed medically appropriate. Will continue to follow in acute setting in order to address the above functional deficits.  Co-tx collaboration this date to safely meet goals and will have better occupational performance outcomes with in a co-treatment than 1:1 session.     Therapy Prognosis: Fair  Decision Making: Medium Complexity  Barriers to Learning: Cognition  Requires PT Follow-Up: Yes  Activity Tolerance  Activity Tolerance: Patient tolerated evaluation without incident;Patient limited by fatigue     Plan   Physcial Therapy Plan  General Plan: 3-5 times per week  Current Treatment Recommendations: ROM, Strengthening, Balance training, Gait training, Stair training, Functional mobility training, Safety education & training, Therapeutic activities, Home exercise program, Patient/Caregiver education & training, Transfer training, Neuromuscular re-education, Positioning, Endurance training, Pain management  Safety Devices  Type of Devices: Patient at risk for falls, Call light within reach, Bed alarm in place, All fall risk precautions in place, Left in bed, Gait belt, Nurse notified  Restraints  Restraints Initially in Place: No     Restrictions  Restrictions/Precautions  Restrictions/Precautions: Fall Risk, Weight Bearing  Required Braces or Orthoses?: Yes  Upper Extremity Weight Bearing Restrictions  Right Upper Extremity Weight Bearing: Non Weight Bearing  Required Braces or Orthoses  Right Upper Extremity Brace/Splint:  (Splint)     Subjective   Pain: Denies pain at rest  General  Chart Reviewed: Yes  Patient assessed for rehabilitation services?: Yes  Response To Previous Treatment: Not applicable  Family / Caregiver Present: No  Referring Practitioner: Dottie Murray MD  Referral Date : 01/31/23  Diagnosis: R wrist fracture, post op R radial ORIF on 1/31/23  Follows Commands: Within Functional Limits  General Comment  Comments: Pt in bed upon arrival, RN cleared for therapy  Subjective  Subjective: pleasant and agreeable to therapy eval.         Social/Functional History  Social/Functional History  Lives With: Spouse  Type of Home: House  Home Layout: Two level, Able to Live on Main level with bedroom/bathroom  Home Access: Stairs to enter with rails  Entrance Stairs - Number of Steps: 8 VETO  Entrance Stairs - Rails: Both  Bathroom Shower/Tub: Walk-in shower, Shower chair without back  Bathroom Toilet: Handicap height  Bathroom Equipment: Grab bars in shower, Grab bars around toilet  Home Equipment: Rollator, Wheelchair-manual, Lift chair  Has the patient had two or more falls in the past year or any fall with injury in the past year?: Yes (2 falls, hurt her R hip)  ADL Assistance: Needs assistance ( or home aid assists with bathing, dressing, and toiliting. Able to complete her own grooming)  Homemaking Assistance: Needs assistance ( complete all household tasks)  Homemaking Responsibilities: No ( completes)  Ambulation Assistance: Independent (With Rollator)  Transfer Assistance: Needs assistance (Luizd helps \"A lot\" with with all transfers)  Active : No  Patient's  Info:  drives  Occupation: Retired  Type of Occupation: Manager  Leisure & Hobbies: read and watch TV    [address]: Impaired  Vision Exceptions: Wears glasses for distance  Hearing  Hearing: Within functional limits      Cognition   Orientation  Overall Orientation Status: Within Normal Limits  Orientation Level: Oriented X4     Objective   Heart Rate: 84  Heart Rate Source: Monitor  BP: 119/74  BP Location: Left upper arm  BP Method: Automatic  Patient Position: Right side  MAP (Calculated): 89  Resp: 16  SpO2: 94 %  O2 Device: None (Room air)     Observation/Palpation  Posture: Poor  Observation: Increased thoracic kyphosis and forward head posture. Gross Assessment  AROM: Generally decreased, functional  PROM: Generally decreased, functional  Strength: Grossly decreased, non-functional     Bed Mobility Training  Bed Mobility Training: Yes  Overall Level of Assistance: Moderate assistance;Maximum assistance;Assist X2;Additional time; Adaptive equipment  Supine to Sit: Maximum assistance; Adaptive equipment; Additional time;Assist X2 (HOB elevated)  Sit to Supine: Maximum assistance; Additional time;Assist X2  Balance  Sitting: Impaired  Sitting - Static: Fair (occasional)  Sitting - Dynamic: Fair (occasional)  Standing: Impaired (With total Ax2, heavy posterior lean)  Standing - Static: Constant support;Poor  Standing - Dynamic: Constant support;Poor  Transfer Training  Transfer Training: Yes  Stand to Sit: Total assistance;Assist X2;Additional time  Stand Pivot Transfers: Total assistance;Assist X2;Additional time    OutComes Score  AM-PAC Score  AM-PAC Inpatient Mobility Raw Score : 9 (02/01/23 1323)  AM-PAC Inpatient T-Scale Score : 30.55 (02/01/23 1323)  Mobility Inpatient CMS 0-100% Score: 81.38 (02/01/23 1323)  Mobility Inpatient CMS G-Code Modifier : CM (02/01/23 1323)    Goals  Short Term Goals  Time Frame for Short Term Goals: 2/8/23  Short Term Goal 1: pt will complete bed mobility with mod Ax2  Short Term Goal 2: pt will complete trasnfers with mod Ax2  Short Term Goal 3: pt will participate in gait assessment when appropraite. Short Term Goal 4: pt stand for 30 sec with mod A and LRAD  Short Term Goal 5: pt will participate in 10-12 reps of BLE exercises by 2/4/23  Additional Goals?: No  Patient Goals   Patient Goals : \"To go home\"       Education  Patient Education  Education Given To: Patient; Family  Education Provided: Role of Therapy;Transfer Training;Precautions;Plan of Care  Education Method: Verbal  Barriers to Learning: Cognition  Education Outcome: Verbalized understanding;Continued education needed      Therapy Time   Individual Concurrent Group Co-treatment   Time In 1137         Time Out 1210         Minutes 33         Timed Code Treatment Minutes: 23 Minutes (10 min eval)       Anna Ledesma, PT, DPT     If pt is unable to be seen after this session, please let this note serve as discharge summary. Please see case management note for discharge disposition.   Thank you.

## 2023-02-01 NOTE — ANESTHESIA POSTPROCEDURE EVALUATION
Department of Anesthesiology  Postprocedure Note    Patient: Analisa Evans  MRN: [id number]  YOB: 1945  Date of evaluation: 1/31/2023      Procedure Summary     Date: 01/31/23 Room / Location: [address] [place] [address]    Anesthesia Start: 7682 Anesthesia Stop: 1500    Procedure: IRRIGATION AND DEBRIDEMENT RIGHT WRIST OPEN REDUCTION INTERNAL FIXATION (Right: Wrist) Diagnosis:       Open wrist fracture, right, initial encounter      (RIGHT OPEN WRIST FRACTURE)    Surgeons: Courtney Tavera MD Responsible Provider: Nadira Pierce MD    Anesthesia Type: general ASA Status: 3          Anesthesia Type: No value filed. Dipesh Phase I: Dipesh Score: 8    Dipesh Phase II:        Anesthesia Post Evaluation    Patient location during evaluation: PACU  Patient participation: complete - patient participated  Level of consciousness: awake and alert  Airway patency: patent  Nausea & Vomiting: no nausea and no vomiting  Complications: no  Cardiovascular status: blood pressure returned to baseline  Respiratory status: acceptable  Hydration status: euvolemic  Comments: VSS on transfer to phase 2 recovery. No anesthetic complications.

## 2023-02-02 VITALS
RESPIRATION RATE: 16 BRPM | TEMPERATURE: 97.8 F | HEIGHT: 63 IN | WEIGHT: 136 LBS | SYSTOLIC BLOOD PRESSURE: 132 MMHG | BODY MASS INDEX: 24.1 KG/M2 | OXYGEN SATURATION: 96 % | HEART RATE: 79 BPM | DIASTOLIC BLOOD PRESSURE: 73 MMHG

## 2023-02-02 LAB
ANION GAP SERPL CALCULATED.3IONS-SCNC: 10 MMOL/L (ref 3–16)
BASOPHILS ABSOLUTE: 0.1 K/UL (ref 0–0.2)
BASOPHILS RELATIVE PERCENT: 1 %
BUN BLDV-MCNC: 13 MG/DL (ref 7–20)
CALCIUM SERPL-MCNC: 9.1 MG/DL (ref 8.3–10.6)
CHLORIDE BLD-SCNC: 106 MMOL/L (ref 99–110)
CO2: 25 MMOL/L (ref 21–32)
CREAT SERPL-MCNC: 1 MG/DL (ref 0.6–1.2)
EOSINOPHILS ABSOLUTE: 0.2 K/UL (ref 0–0.6)
EOSINOPHILS RELATIVE PERCENT: 2.4 %
GFR SERPL CREATININE-BSD FRML MDRD: 57 ML/MIN/{1.73_M2}
GLUCOSE BLD-MCNC: 94 MG/DL (ref 70–99)
HCT VFR BLD CALC: 33.3 % (ref 36–48)
HEMOGLOBIN: 11 G/DL (ref 12–16)
LYMPHOCYTES ABSOLUTE: 2.6 K/UL (ref 1–5.1)
LYMPHOCYTES RELATIVE PERCENT: 32.3 %
MCH RBC QN AUTO: 32.5 PG (ref 26–34)
MCHC RBC AUTO-ENTMCNC: 33.2 G/DL (ref 31–36)
MCV RBC AUTO: 98.1 FL (ref 80–100)
MONOCYTES ABSOLUTE: 0.6 K/UL (ref 0–1.3)
MONOCYTES RELATIVE PERCENT: 7.6 %
NEUTROPHILS ABSOLUTE: 4.6 K/UL (ref 1.7–7.7)
NEUTROPHILS RELATIVE PERCENT: 56.7 %
PDW BLD-RTO: 17 % (ref 12.4–15.4)
PLATELET # BLD: 250 K/UL (ref 135–450)
PMV BLD AUTO: 8.8 FL (ref 5–10.5)
POTASSIUM SERPL-SCNC: 4.3 MMOL/L (ref 3.5–5.1)
RBC # BLD: 3.39 M/UL (ref 4–5.2)
SODIUM BLD-SCNC: 141 MMOL/L (ref 136–145)
WBC # BLD: 8.2 K/UL (ref 4–11)

## 2023-02-02 PROCEDURE — 36415 COLL VENOUS BLD VENIPUNCTURE: CPT

## 2023-02-02 PROCEDURE — 97535 SELF CARE MNGMENT TRAINING: CPT

## 2023-02-02 PROCEDURE — 97530 THERAPEUTIC ACTIVITIES: CPT

## 2023-02-02 PROCEDURE — 97110 THERAPEUTIC EXERCISES: CPT

## 2023-02-02 PROCEDURE — 6360000002 HC RX W HCPCS: Performed by: ORTHOPAEDIC SURGERY

## 2023-02-02 PROCEDURE — 6370000000 HC RX 637 (ALT 250 FOR IP): Performed by: SPECIALIST/TECHNOLOGIST

## 2023-02-02 PROCEDURE — 6370000000 HC RX 637 (ALT 250 FOR IP): Performed by: ORTHOPAEDIC SURGERY

## 2023-02-02 PROCEDURE — 80048 BASIC METABOLIC PNL TOTAL CA: CPT

## 2023-02-02 PROCEDURE — 85025 COMPLETE CBC W/AUTO DIFF WBC: CPT

## 2023-02-02 PROCEDURE — 99024 POSTOP FOLLOW-UP VISIT: CPT | Performed by: SPECIALIST/TECHNOLOGIST

## 2023-02-02 RX ORDER — HYDROCODONE BITARTRATE AND ACETAMINOPHEN 5; 325 MG/1; MG/1
1 TABLET ORAL EVERY 6 HOURS PRN
Qty: 12 TABLET | Refills: 0 | Status: SHIPPED | OUTPATIENT
Start: 2023-02-02 | End: 2023-02-05

## 2023-02-02 RX ORDER — LEVOTHYROXINE SODIUM 112 UG/1
112 TABLET ORAL DAILY
Qty: 30 TABLET | Refills: 3 | DISCHARGE
Start: 2023-02-03

## 2023-02-02 RX ORDER — METHYLPHENIDATE HYDROCHLORIDE 10 MG/1
10 TABLET ORAL 2 TIMES DAILY
Qty: 6 TABLET | Refills: 0 | Status: SHIPPED | OUTPATIENT
Start: 2023-02-02 | End: 2023-02-05

## 2023-02-02 RX ORDER — ROPINIROLE 1 MG/1
1 TABLET, FILM COATED ORAL NIGHTLY
DISCHARGE
Start: 2023-02-02

## 2023-02-02 RX ADMIN — ATORVASTATIN CALCIUM 40 MG: 40 TABLET, FILM COATED ORAL at 09:24

## 2023-02-02 RX ADMIN — DULOXETINE HYDROCHLORIDE 120 MG: 60 CAPSULE, DELAYED RELEASE ORAL at 09:24

## 2023-02-02 RX ADMIN — AMLODIPINE BESYLATE 10 MG: 5 TABLET ORAL at 09:24

## 2023-02-02 RX ADMIN — ACETAMINOPHEN 325MG 650 MG: 325 TABLET ORAL at 06:13

## 2023-02-02 RX ADMIN — CELECOXIB 200 MG: 100 CAPSULE ORAL at 09:24

## 2023-02-02 RX ADMIN — CARBIDOPA AND LEVODOPA 1 TABLET: 25; 100 TABLET ORAL at 14:29

## 2023-02-02 RX ADMIN — PANTOPRAZOLE SODIUM 40 MG: 40 TABLET, DELAYED RELEASE ORAL at 09:24

## 2023-02-02 RX ADMIN — POLYETHYLENE GLYCOL 3350 17 G: 17 POWDER, FOR SOLUTION ORAL at 09:24

## 2023-02-02 RX ADMIN — ROPINIROLE HYDROCHLORIDE 1 MG: 0.5 TABLET, FILM COATED ORAL at 14:29

## 2023-02-02 RX ADMIN — ACETAMINOPHEN 325MG 650 MG: 325 TABLET ORAL at 00:16

## 2023-02-02 RX ADMIN — ARIPIPRAZOLE 5 MG: 10 TABLET ORAL at 09:24

## 2023-02-02 RX ADMIN — LEVOTHYROXINE SODIUM 112 MCG: 112 TABLET ORAL at 06:13

## 2023-02-02 RX ADMIN — ROPINIROLE HYDROCHLORIDE 1 MG: 0.5 TABLET, FILM COATED ORAL at 09:24

## 2023-02-02 RX ADMIN — ACETAMINOPHEN 325MG 650 MG: 325 TABLET ORAL at 12:13

## 2023-02-02 RX ADMIN — CARBIDOPA AND LEVODOPA 1 TABLET: 25; 100 TABLET ORAL at 09:25

## 2023-02-02 RX ADMIN — ENOXAPARIN SODIUM 40 MG: 100 INJECTION SUBCUTANEOUS at 09:25

## 2023-02-02 NOTE — PROGRESS NOTES
Department of Orthopedic Surgery  Physician Assistant   Progress Note    Subjective:       Systemic or Specific Complaints: The patient endorses some pain in her wrist with pronation/supination movement. Splint applied, intact. No family at bedside. Objective:     Patient Vitals for the past 24 hrs:   BP Temp Temp src Pulse Resp SpO2   02/02/23 0400 111/66 98 °F (36.7 °C) Oral 81 16 94 %   02/02/23 0008 100/61 98.7 °F (37.1 °C) Oral 82 16 92 %   02/01/23 2309 (!) 99/59 -- -- -- -- --   02/01/23 2302 (!) 98/58 99 °F (37.2 °C) Oral 91 16 92 %   02/01/23 1939 113/66 98.9 °F (37.2 °C) Oral 96 16 94 %   02/01/23 1505 118/69 97.7 °F (36.5 °C) Oral 90 16 96 %   02/01/23 1148 119/74 -- -- 84 -- 94 %   02/01/23 0923 (!) 144/73 98.2 °F (36.8 °C) Oral 89 16 94 %         General: alert, appears stated age, cooperative, and no distress   Wound: Post op dressing/splint CDI   Motion: immobilized in affected extremity   DVT Exam: No evidence of DVT seen on physical exam.     Additional exam: The patient was seen sitting up in the chair, leaning with her weight slightly shifted to the right with her arm resting on a pillow  Ecchymosis in various stages of healing and moderate swelling present on the affected hand/fingers. Full finger ROM on verbal que, middle finger trigger finger unchanged. Encouraged to continue movement and keep arm elevated to help alleviate swelling. Sensation intact to light touch. Capillary refill approx 3 seconds.      Data Review  CBC:   Lab Results   Component Value Date/Time    WBC 8.2 02/02/2023 05:57 AM    RBC 3.39 02/02/2023 05:57 AM    HGB 11.0 02/02/2023 05:57 AM    HCT 33.3 02/02/2023 05:57 AM     02/02/2023 05:57 AM       Renal:   Lab Results   Component Value Date/Time     02/02/2023 05:57 AM    K 4.3 02/02/2023 05:57 AM    K 4.3 01/31/2023 06:02 AM     02/02/2023 05:57 AM    CO2 25 02/02/2023 05:57 AM    BUN 13 02/02/2023 05:57 AM    CREATININE 1.0 02/02/2023 05:57 AM    GLUCOSE 94 02/02/2023 05:57 AM    CALCIUM 9.1 02/02/2023 05:57 AM        EBL less than 50mL    Assessment:     Irrigation and debridement of right wrist, open reduction internal fixation of distal radius open fracture, POD 2. DOS 1/31/23 by Dr Kelly Corner:      1:  Nonweightbearing to the right arm. Keep splint clean, dry, intact until follow up in 2 weeks  2:  Continue Deep venous thrombosis prophylaxis- lovenox per primary, OK for ambulation at discharge from ortho standpoint  3:  Continue Pain Control- scheduled tylenol, oxycodone PRN  4: PT/OT recommending discharge to subacute/skilled nursing facility with no equipment requirement. 5: Two doses IV ancef completed post op  6: Discharge pending medical stability, potential need for placement. OK to discharge from ortho standpoint.  DCP updated, pain script in chart    [facility] PA-S1    Sharmaine Griffith PA-C

## 2023-02-02 NOTE — DISCHARGE SUMMARY
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 Hospital Course:     Patient with Parkinson's disease and poor functional baseline admitted with right wrist pain after fall. Diagnosed with acute fracture and underwent open reduction internal fixation. Tolerated surgical intervention well. Was clinically stable on the day of discharge. Will be discharged to the skilled nursing facility. Activity and follow-up orders written by orthopedic surgery. Prescriptions given    Able to eat breakfast on the day of discharge. No GI symptoms. Physical Exam Performed:     /66   Pulse 81   Temp 98 °F (36.7 °C) (Oral)   Resp 16   Ht 5' 3\" (1.6 m)   Wt 136 lb (61.7 kg)   SpO2 94%   BMI 24.09 kg/m²       General appearance:  No apparent distress, appears stated age and cooperative. HEENT:  Normal cephalic, atraumatic without obvious deformity. Pupils equal, round, and reactive to light. Extra ocular muscles intact. Conjunctivae/corneas clear. Neck: Supple, with full range of motion. No jugular venous distention. Trachea midline. Respiratory:  Normal respiratory effort. Clear to auscultation, bilaterally without Rales/Wheezes/Rhonchi.   Cardiovascular:  Regular rate and rhythm with normal S1/S2 without murmurs, rubs or gallops. Abdomen: Soft, non-tender, non-distended with normal bowel sounds. Musculoskeletal:  No clubbing, cyanosis or edema bilaterally. Full range of motion without deformity. Right upper extremity postop with no oozing over the dressing  Skin: Skin color, texture, turgor normal.  No rashes or lesions. Neurologic:  Neurovascularly intact without any focal sensory/motor deficits. Cranial nerves: II-XII intact, grossly non-focal.  Psychiatric:  Alert and oriented, thought content appropriate, normal insight  Capillary Refill: Brisk,< 3 seconds   Peripheral Pulses: +2 palpable, equal bilaterally       Labs: For convenience and continuity at follow-up the following most recent labs are provided:      CBC:    Lab Results   Component Value Date/Time    WBC 8.2 02/02/2023 05:57 AM    HGB 11.0 02/02/2023 05:57 AM    HCT 33.3 02/02/2023 05:57 AM     02/02/2023 05:57 AM       Renal:    Lab Results   Component Value Date/Time     02/02/2023 05:57 AM    K 4.3 02/02/2023 05:57 AM    K 4.3 01/31/2023 06:02 AM     02/02/2023 05:57 AM    CO2 25 02/02/2023 05:57 AM    BUN 13 02/02/2023 05:57 AM    CREATININE 1.0 02/02/2023 05:57 AM    CALCIUM 9.1 02/02/2023 05:57 AM         Significant Diagnostic Studies    Radiology:   XR WRIST RIGHT (MIN 3 VIEWS)   Final Result   Intraprocedural fluoroscopic spot images as above. See separate procedure   report for more information. FLUORO FOR SURGICAL PROCEDURES   Final Result   Intraprocedural fluoroscopic spot images as above. See separate procedure   report for more information.          XR CHEST PORTABLE   Final Result   No acute disease                Consults:     None    Disposition:  SNF     Condition at Discharge: Stable    Discharge Instructions/Follow-up:  SNF    Code Status:  Full Code     Activity: activity as tolerated    Diet: regular diet      Discharge Medications:     Current Discharge Medication List             Details HYDROcodone-acetaminophen (NORCO) 5-325 MG per tablet Take 1 tablet by mouth every 6 hours as needed for Pain for up to 3 days. Max Daily Amount: 4 tablets  Qty: 12 tablet, Refills: 0    Comments: Reduce doses taken as pain becomes manageable  Associated Diagnoses: Closed fracture of right wrist, initial encounter      methylphenidate (RITALIN) 10 MG tablet Take 1 tablet by mouth 2 times daily for 3 days. Home medication prior to inpatient admission Max Daily Amount: 20 mg  Qty: 6 tablet, Refills: 0    Associated Diagnoses: Narcolepsy due to underlying condition without cataplexy; PD (Parkinson's disease) ([facility])      levothyroxine (SYNTHROID) 112 MCG tablet Take 1 tablet by mouth Daily  Qty: 30 tablet, Refills: 3      rOPINIRole (REQUIP) 1 MG tablet Take 1 tablet by mouth nightly    Associated Diagnoses: PD (Parkinson's disease) ([address] Restless legs syndrome (RLS)                Details   carbidopa-levodopa (SINEMET)  MG per tablet Take 1 tablet by mouth 3 times daily      celecoxib (CELEBREX) 200 MG capsule Take 1 capsule by mouth 2 times daily  Qty: 180 capsule, Refills: 1      traZODone (DESYREL) 50 MG tablet TAKE 2 TABLETS BY MOUTH NIGHTLY  Qty: 60 tablet, Refills: 1      simvastatin (ZOCOR) 40 MG tablet TAKE ONE TABLET BY MOUTH NIGHTLY  Qty: 90 tablet, Refills: 2      amLODIPine (NORVASC) 10 MG tablet TAKE ONE TABLET BY MOUTH EVERY DAY  Qty: 90 tablet, Refills: 3      Misc. Devices (ROLLATOR ULTRA-LIGHT) MISC 1 each by Does not apply route daily  Qty: 1 each, Refills: 0    Associated Diagnoses: PD (Parkinson's disease) ([facility])      ARIPiprazole (ABILIFY) 5 MG tablet       DULoxetine (CYMBALTA) 60 MG extended release capsule 120 mg 2 cap daily      omeprazole (PRILOSEC) 20 MG capsule Take 20 mg by mouth daily. Indications: take dos             Time Spent on discharge: 45 minutes in the examination, evaluation, counseling and review of medications and discharge plan.       Signed:    Brigid Matias MD   2/2/2023      Thank you Alane Lanes, MD for the opportunity to be involved in this patient's care. If you have any questions or concerns, please feel free to contact me at [phone].

## 2023-02-02 NOTE — PROGRESS NOTES
Occupational Therapy  Facility/Department: [facility] C5 - MED SURG/ORTHO  Daily Treatment Note  NAME: Yusuf Staley  : 1945  MRN: [id number]    Date of Service: 2023    Discharge Recommendations:  Subacute/Skilled Nursing Facility       AM-PAC score  AM-PAC Inpatient Daily Activity Raw Score: 9 (23 1016)  AM-PAC Inpatient ADL T-Scale Score : 25.33 (23 1016)  ADL Inpatient CMS 0-100% Score: 79.59 (23 1016)  ADL Inpatient CMS G-Code Modifier : CL (23 1016)    Patient Diagnosis(es): The primary encounter diagnosis was Closed fracture of right wrist, initial encounter. Diagnoses of Narcolepsy due to underlying condition without cataplexy, PD (Parkinson's disease) ([facility] [address] and Restless legs syndrome (RLS) were also pertinent to this visit. Assessment    Assessment: Pt with fair tolerance of OT treatment. Pt requires Ax2 for all bed mobility and transfer training with use of Tylene [place]. Pt max A for seated grooming tasks. Pt with very flat affect and minimal verbalizations, prefers eyes closed but able to open when asked. Co-tx collaboration this date with PT staff to safely progress pt toward goals. Pt will have better occupational performance outcomes within a co-treatment than 1:1 session. Pt functioning below her baseline and would benefit from continued skilled OT in SNF setting at d/c. Activity Tolerance: Patient limited by fatigue  Discharge Recommendations: Subacute/Skilled Nursing Facility      Plan   Occupational Therapy Plan  Times Per Week: 3-5x     Restrictions  Restrictions/Precautions  Restrictions/Precautions: Fall Risk;Weight Bearing;Up as Tolerated  Required Braces or Orthoses?: Yes  Upper Extremity Weight Bearing Restrictions  Right Upper Extremity Weight Bearing: Non Weight Bearing    Subjective   Subjective  Subjective: Pt resting in bed, agreeable to OT treatment. Pt with flat affect, minimal verbalizations.     Orientation  Overall Orientation Status: Within Functional Limits    Pain: Denies pain at rest    Cognition  Overall Cognitive Status: WFL        Objective    Vitals  Vitals:    02/02/23 0915   BP: 132/73   Pulse: 79   Resp: 16   Temp: 97.8 °F (36.6 °C)   SpO2: 96%          Bed Mobility Training  Bed Mobility Training: Yes  Interventions: Verbal cues;Manual cues; Tactile cues; Weight shifting training/pressure relief  Supine to Sit: Maximum assistance; Adaptive equipment; Additional time;Assist X2 (HOB elev)    Balance  Sitting: Impaired  Sitting - Static: Fair (occasional)  Sitting - Dynamic: Fair (occasional)  Standing: Impaired (rubin sterojelio)  Standing - Static: Constant support;Poor  Standing - Dynamic: Constant support;Poor    Transfer Training  Transfer Training: Yes (rubin spearsrojelio)  Interventions: Verbal cues; Safety awareness training; Tactile cues;Manual cues; Visual cues  Sit to Stand: Assist X2;Maximum assistance  Stand to Sit: Assist X2;Maximum assistance       ADL  Grooming: Setup;Maximum assistance  Grooming Skilled Clinical Factors: for denture care and application of adhesive, to comb hair and wash face seated    Toileting: Dependent/Total  Toileting Skilled Clinical Factors: purewick          Safety Devices  Type of Devices: Left in chair;Chair alarm in place;Call light within reach;Nurse notified;Gait belt;Telesitter in use       Patient Education  Education Given To: Patient  Education Provided: Role of Therapy;Plan of Care;Transfer Training;ADL Adaptive Strategies  Education Method: Verbal  Barriers to Learning: None  Education Outcome: Verbalized understanding;Continued education needed    Goals  Short Term Goals  Time Frame for Short Term Goals: 1 week (2/8) unless noted-- all goals ongoing 2/2/23  Short Term Goal 1: Perform functional transfer with Stedy and mod A by 2/6  Short Term Goal 2: Perform 1-2 grooming tasks with SBA  Short Term Goal 3: Feed self with SBA after s/u  Patient Goals   Patient goals :  \"Go home today\"       Therapy Time Individual Concurrent Group Co-treatment   Time In 0920         Time Out 1000         Minutes [address], H[place]

## 2023-02-02 NOTE — CARE COORDINATION
CASE MANAGEMENT DISCHARGE SUMMARY      Discharge to: [facility]    Precertification completed: [facility] Exemption Notification (HENS) completed: yes    IMM given: today    Transportation:    Medical Transport explained to pt/family. Pt/family voice no agency preference. Agency used: [facility]   time: 3-3:30pm   Ambulance form completed: Yes    Confirmed discharge plan with:   Patient: yes     Family:  yes,  at bedside     Facility/Agency, name:  [facility] pulled from [facility] by [employer]. Phone number for report to facility: [phone], ask for [address] nurse     RN, name: Romana Michael    Note: Discharging nurse to complete [facility], reconcile [facility], and place final copy with patient's discharge packet. RN to ensure that written prescriptions for Level II medications are sent with patient to the facility as per protocol. Covid negative on 1/30.   URIAH Saldana-RN

## 2023-02-02 NOTE — PROGRESS NOTES
Patient discharged to [facility] via transport in stable condition.     Bertrand Call RN

## 2023-02-02 NOTE — PROGRESS NOTES
Physical Therapy  Facility/Department: [facility] C5 - MED SURG/ORTHO  Daily Treatment Note  NAME: Yue Downey  : 1945  MRN: [id number]    Date of Service: 2023    Discharge Recommendations:  Subacute/Skilled Nursing Facility   PT Equipment Recommendations  Equipment Needed: No  [facility] 6 Clicks Inpatient Mobility:  AM-PAC Basic Mobility - Inpatient   How much help is needed turning from your back to your side while in a flat bed without using bedrails?: A Lot  How much help is needed moving from lying on your back to sitting on the side of a flat bed without using bedrails?: A Lot  How much help is needed moving to and from a bed to a chair?: Total  How much help is needed standing up from a chair using your arms?: A Lot  How much help is needed walking in hospital room?: Total  How much help is needed climbing 3-5 steps with a railing?: Total  AM-PAC Inpatient Mobility Raw Score : 9  AM-[facility] Inpatient T-Scale Score : 30.55  Mobility Inpatient CMS 0-100% Score: 81.38  Mobility Inpatient CMS G-Code Modifier : CM    Patient Diagnosis(es): The primary encounter diagnosis was Closed fracture of right wrist, initial encounter. Diagnoses of Narcolepsy due to underlying condition without cataplexy, PD (Parkinson's disease) ([facility] [address] and Restless legs syndrome (RLS) were also pertinent to this visit. Assessment   Assessment: Pt demos grossly max Ax2 for mobility and rubin stedy d/t weakness & poor standing tolerance. At this time pt is not safe to return home due to high levels of assist required, it is rec pt DC to SNF when deemed medically appropriate. Will continue to follow in acute setting in order to address the above functional deficits.  Co-tx collaboration this date to safely meet goals and will have better occupational performance outcomes with in a co-treatment  Activity Tolerance: Patient limited by fatigue  Equipment Needed: No     Plan    Physcial Therapy Plan  General Plan: 3-5 times per week  Current Treatment Recommendations: ROM; Strengthening;Balance training;Gait training;Stair training;Functional mobility training; Safety education & training; Therapeutic activities; Home exercise program;Patient/Caregiver education & training;Transfer training;Neuromuscular re-education;Positioning; Endurance training;Pain management     Restrictions  Restrictions/Precautions  Restrictions/Precautions: Fall Risk, Weight Bearing, Up as Tolerated  Required Braces or Orthoses?: Yes  Upper Extremity Weight Bearing Restrictions  Right Upper Extremity Weight Bearing: Non Weight Bearing  Required Braces or Orthoses  Right Upper Extremity Brace/Splint:  (Splint)     Subjective    Subjective  Subjective: Pt agrees to PT session, flat affect throughout  Pain: Denies pain at rest  Orientation  Overall Orientation Status: Within Functional Limits  Cognition  Overall Cognitive Status: WFL     Objective   Vitals  Comment: 132/73  HR 79  O2 96% RA  Bed Mobility Training  Bed Mobility Training: Yes  Interventions: Verbal cues;Manual cues; Tactile cues; Weight shifting training/pressure relief  Supine to Sit: Maximum assistance; Adaptive equipment; Additional time;Assist X2 (HOB elev)  Balance  Sitting: Impaired  Sitting - Static: Fair (occasional)  Sitting - Dynamic: Fair (occasional)  Standing: Impaired (rubin stedy)  Standing - Static: Constant support;Poor  Standing - Dynamic: Constant support;Poor  Transfer Training  Transfer Training: Yes (rubin jimenez)  Interventions: Verbal cues; Safety awareness training; Tactile cues;Manual cues; Visual cues  Sit to Stand: Assist X2;Maximum assistance  Stand to Sit: Assist X2;Maximum assistance  Duration: 5 (EOB)  Gait Training  Gait Training: No (d/t weakness & poor standing tolerance)     PT Exercises  Exercise Treatment: performed BLE TE 10X EACH: AP, SAQ, QS, GS, HIP ABD, HS       Safety Devices  Type of Devices: Left in chair;Chair alarm in place;Call light within reach;Nurse notified;Gait belt;Telesitter in use       Goals  Short Term Goals  Time Frame for Short Term Goals: 2/8/23  Short Term Goal 1: pt will complete bed mobility with mod Ax2  Short Term Goal 2: pt will complete trasnfers with mod Ax2  Short Term Goal 3: pt will participate in gait assessment when appropraite. Short Term Goal 4: pt stand for 30 sec with mod A and LRAD  Short Term Goal 5: pt will participate in 10-12 reps of BLE exercises by 2/4/23  Additional Goals?: No  Patient Goals   Patient Goals :  \"To go home\"    Education  Patient Education  Education Given To: Patient  Education Provided: Role of Therapy;Transfer Training;Precautions;Plan of Care  Education Method: Verbal  Barriers to Learning: Cognition  Education Outcome: Verbalized understanding;Continued education needed    Therapy Time   Individual Concurrent Group Co-treatment   Time In 0920         Time Out 1000         Minutes 40         Timed Code Treatment Minutes: Aravind Snyder

## 2023-02-02 NOTE — CARE COORDINATION
Chart reviewed, PT/OT recommending SNF, per previous CM note, family's SNF choice is [facility]. Writer left vmail for admissions at [facility]. Dr Clementine Parson updated writer, pt can likely discharge to SNF today. GULSHAN Bustamante     [id number] Addendum:  [facility] can accept today. Writer arranged [facility] transport for [employer] left vmail for pt's  to confirm plan.   GULSHAN Bustamante

## 2023-02-02 NOTE — PROGRESS NOTES
Report called and given to Prisca Kyle, nurse at [facility] & NURSING [facility].     Bakari Kenny RN

## 2023-02-02 NOTE — PROGRESS NOTES
Peripheral IV removed, patient tolerated well. Medication prescriptions placed in folder with discharge paperwork/instructions and [facility].      Bakari Kenny RN

## 2023-02-14 ENCOUNTER — OFFICE VISIT (OUTPATIENT)
Dept: ORTHOPEDIC SURGERY | Age: 78
End: 2023-02-14

## 2023-02-14 VITALS — BODY MASS INDEX: 24.1 KG/M2 | HEIGHT: 63 IN | WEIGHT: 136 LBS

## 2023-02-14 DIAGNOSIS — Z47.89 ORTHOPEDIC AFTERCARE: Primary | ICD-10-CM

## 2023-02-14 NOTE — PROGRESS NOTES
ORTHOPAEDIC SURGERY FOLLOWUP VISIT    CHIEF COMPLAINT:  Follow-up right open distal radius/ulna fracture    DATE OF INJURY: 1/30/2023  DIAGNOSIS: Right open distal radius/ulna fracture  DATE OF SURGERY: 1/31/2023, irrigation debridement debridement/ORIF right open distal radius fracture    HISTORY OF PRESENT ILLNESS:  68-year-old right-hand-dominant female presents for evaluation 2 weeks status post the above-mentioned procedure. Overall, she is doing well. She denies significant pain. Pain level 3 out of 10. Her pain is worse with activities and improved with rest.  She denies numbness or tingling. She has been compliant with nonweightbearing restrictions. She has not had any fevers, chills, or night sweats. PHYSICAL EXAM:  General: Elderly appearing female. Presents on a stretcher. Right upper extremity: Short arm volar wrist splint is clean, dry, and intact. This is removed in order to examine the skin. Incision over the volar wrist is pristinely healed. Nylon sutures are in place. There is no signs of dehiscence. No open areas. No surrounding erythema. Dorsal wrist incision also is well approximated with nylon's. This is maturely healed. Sutures removed and Steri-Strips applied. There is minimal bruising about the volar forearm. No significant swelling. Wrist range of motion was not assessed today. Sensation is intact to light touch in median, radial, ulnar, and axillary distributions. Motor function is intact to AIN, PIN, ulnar motor nerves. There is a strong palpable radial pulse, and brisk capillary refill to the fingers. Compartments are soft and compressible    RADIOGRAPHIC EXAM:  Multiple views of the right wrist including PA, oblique, lateral, and 20 degree inclined lateral projections demonstrate appropriate positioning of distal radius fracture. No change in alignment from previous intraoperative fluoroscopy images. Associated distal ulna fracture is anatomically aligned. There is evidence of periosteal callus primarily about the dorsal distal radius. Radial height is appropriate without significant loss of height. 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 ASSESSMENT AND PLAN:  2 weeks status post irrigation and debridement, ORIF right open distal radius/ulna fracture    Nonweightbearing right upper extremity  Pain control  Sutures removed and Steri-Strips applied. Wound is healing well without evidence of infection. Transition to removable Velcro wrist brace. May remove wrist brace for hygiene purposes. Otherwise should wear it full-time. May shower but not soak or submerge incisional sites. In 1 week, can initiate OT wrist movement with gentle active assisted wrist range of motion in flexion/extension, radial/ulnar deviation, pronation supination. These exercises were described and demonstrated today. Return to clinic in 3 weeks for repeat x-rays.     Anny Taylor MD

## 2023-02-24 NOTE — TELEPHONE ENCOUNTER
Addressed at last visit she can take an additional dose of lasix and use compression socks and keep feet elevated. If she has been doing this may need an appointment. Upon review of the In Basket request we Chart is current  DOS 11-21-22 for both vaccines    Any additional questions or concerns should be emailed to the Practice Liaisons via the appropriate education email address, please do not reply via In Basket      Thank you  Janny Polanco MA

## 2023-03-03 ENCOUNTER — OFFICE VISIT (OUTPATIENT)
Dept: ORTHOPEDIC SURGERY | Age: 78
End: 2023-03-03

## 2023-03-03 VITALS — WEIGHT: 136 LBS | HEIGHT: 63 IN | BODY MASS INDEX: 24.1 KG/M2

## 2023-03-03 DIAGNOSIS — Z87.81 S/P ORIF (OPEN REDUCTION INTERNAL FIXATION) FRACTURE: ICD-10-CM

## 2023-03-03 DIAGNOSIS — Z98.890 S/P ORIF (OPEN REDUCTION INTERNAL FIXATION) FRACTURE: ICD-10-CM

## 2023-03-03 DIAGNOSIS — Z47.89 ORTHOPEDIC AFTERCARE: Primary | ICD-10-CM

## 2023-03-03 NOTE — PROGRESS NOTES
ORTHOPAEDIC SURGERY FOLLOWUP VISIT     CHIEF COMPLAINT:  Follow-up right open distal radius/ulna fracture     DATE OF INJURY: 1/30/2023  DIAGNOSIS: Right open distal radius/ulna fracture  DATE OF SURGERY: 1/31/2023, irrigation debridement debridement/ORIF right open distal radius fracture     HISTORY OF PRESENT ILLNESS:  77-year-old right-hand-dominant female presents for evaluation ~5 weeks status post the above-mentioned procedure. Overall, she is doing well. She denies significant pain. Her pain is worse with activities and improved with rest.  She indicates that there is pain only with extremes of movement. She denies numbness or tingling. She has been compliant with nonweightbearing restrictions. She has not had any fevers, chills, or night sweats. She has not had any wound healing problems. She feels there has been improvement overall. PHYSICAL EXAM:  General: Elderly appearing female. Presents on a stretcher. Right upper extremity: Presents in removable wrist brace. This is removed in order to examine the skin. Incision over the volar wrist is pristinely healed. Steri-Strips removed. there is no signs of dehiscence. No open areas. No surrounding erythema. Dorsal wrist incision also is well healed. No significant swelling. Wrist range of motion is 50 degrees flexion, 30 degrees extension. There is 20 degrees of radial/ulnar deviation. There is 60 degrees supination and 40 degrees pronation. There is pain at extremes of movement. There is minimal tenderness to palpation about the distal ulna. Sensation is intact to light touch in median, radial, ulnar, and axillary distributions. Motor function is intact to AIN, PIN, ulnar motor nerves. There is a strong palpable radial pulse, and brisk capillary refill to the fingers.   Compartments are soft and compressible     RADIOGRAPHIC EXAM:  Multiple views of the right wrist including PA, oblique, lateral, and 20 degree inclined lateral projections demonstrate appropriate positioning of distal radius fracture. No change in prior alignment. There is progressive periosteal callus about the dorsal radius and radial styloid. There is marginal periosteal callus about the distal ulna. Distal ulna fracture is anatomically aligned. Radial height is appropriate without significant loss of height. 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 ASSESSMENT AND PLAN:  ~5 weeks status post irrigation and debridement, ORIF right open distal radius/ulna fracture     May advance to 5 pound lifting restriction with brace in place. Pain control  Wound is healing well without evidence of infection. May shower but not soak or submerge incisional sites. Continue with removable Velcro wrist brace. May remove wrist brace for hygiene purposes. Continue with OT for wrist range of motion exercises to include flexion/extension, radial/ulnar deviation, pronation/supination with special attention to pronation/supination activities. No strengthening at this time. Return to clinic in 4 weeks for repeat x-rays.      Dheeraj Varner MD

## 2023-05-31 ENCOUNTER — TELEPHONE (OUTPATIENT)
Dept: FAMILY MEDICINE CLINIC | Age: 78
End: 2023-05-31

## 2023-08-17 ENCOUNTER — TELEPHONE (OUTPATIENT)
Dept: FAMILY MEDICINE CLINIC | Age: 78
End: 2023-08-17

## 2023-10-04 ENCOUNTER — TELEPHONE (OUTPATIENT)
Dept: FAMILY MEDICINE CLINIC | Age: 78
End: 2023-10-04

## 2024-05-24 LAB
INR BLD: 1.05
PROTIME: 10.9 SECONDS

## 2024-05-25 LAB
BASOPHILS ABSOLUTE: ABNORMAL
BASOPHILS RELATIVE PERCENT: 0.2 %
EOSINOPHILS ABSOLUTE: ABNORMAL
EOSINOPHILS RELATIVE PERCENT: 0 %
HCT VFR BLD CALC: 36.3 % (ref 36–46)
HEMOGLOBIN: 10.8 G/DL (ref 12–16)
LYMPHOCYTES ABSOLUTE: 0.7 /ΜL
LYMPHOCYTES RELATIVE PERCENT: 3.3 %
MCH RBC QN AUTO: 29.8 PG
MCHC RBC AUTO-ENTMCNC: 29.8 G/DL
MCV RBC AUTO: 100 FL
MONOCYTES ABSOLUTE: ABNORMAL
MONOCYTES RELATIVE PERCENT: 5.2 %
NEUTROPHILS ABSOLUTE: 19.2 /ΜL
NEUTROPHILS RELATIVE PERCENT: 88 %
PLATELET # BLD: 558 K/ΜL
PMV BLD AUTO: ABNORMAL FL
RBC # BLD: 3.63 10^6/ΜL
WBC # BLD: 22.2 10^3/ML

## (undated) DEVICE — TOWEL,OR,DSP,ST,BLUE,STD,6/PK,12PK/CS: Brand: MEDLINE

## (undated) DEVICE — SUTURE ETHLN SZ 3-0 L18IN NONABSORBABLE BLK L24MM PS-1 3/8 1663G

## (undated) DEVICE — Device

## (undated) DEVICE — DRESSING PETRO W3XL3IN OIL EMUL N ADH GZ KNIT IMPREG CELOS

## (undated) DEVICE — TUBING, SUCTION, 3/16" X 12', STRAIGHT: Brand: MEDLINE

## (undated) DEVICE — BANDAGE COMPR W4INXL15FT BGE E SGL LAYERED CLP CLSR

## (undated) DEVICE — CORD ES L12FT BPLR FRCP

## (undated) DEVICE — SUTURE VCRL + SZ 3-0 L27IN ABSRB UD L26MM SH 1/2 CIR VCP416H

## (undated) DEVICE — GLOVE ORTHO 7 1/2   MSG9475

## (undated) DEVICE — GLOVE SURG SZ 75 L12IN FNGR THK79MIL GRN LTX FREE

## (undated) DEVICE — EVOS DISTAL RADIUS 1.8MM DRILL AO QC: Brand: EVOS

## (undated) DEVICE — GAUZE,SPONGE,4"X4",8PLY,STRL,LF,10/TRAY: Brand: MEDLINE